# Patient Record
Sex: MALE | Race: WHITE | Employment: OTHER | URBAN - METROPOLITAN AREA
[De-identification: names, ages, dates, MRNs, and addresses within clinical notes are randomized per-mention and may not be internally consistent; named-entity substitution may affect disease eponyms.]

---

## 2022-09-02 ENCOUNTER — HOSPITAL ENCOUNTER (EMERGENCY)
Age: 87
Discharge: HOME | End: 2022-09-02
Payer: MEDICARE

## 2022-09-02 ENCOUNTER — APPOINTMENT (OUTPATIENT)
Dept: CT IMAGING | Age: 87
End: 2022-09-02
Payer: MEDICARE

## 2022-09-02 ENCOUNTER — APPOINTMENT (OUTPATIENT)
Dept: GENERAL RADIOLOGY | Age: 87
End: 2022-09-02
Payer: MEDICARE

## 2022-09-02 VITALS
BODY MASS INDEX: 29.8 KG/M2 | WEIGHT: 220 LBS | HEART RATE: 95 BPM | DIASTOLIC BLOOD PRESSURE: 80 MMHG | RESPIRATION RATE: 20 BRPM | OXYGEN SATURATION: 95 % | TEMPERATURE: 99.5 F | SYSTOLIC BLOOD PRESSURE: 136 MMHG | HEIGHT: 72 IN
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
## 2022-09-02 LAB
ALBUMIN SERPL-MCNC: 3.7 G/DL (ref 3.5–4.6)
ALP BLD-CCNC: 82 U/L (ref 35–104)
ALT SERPL-CCNC: 9 U/L (ref 0–41)
ANION GAP SERPL CALCULATED.3IONS-SCNC: 8 MEQ/L (ref 9–15)
APTT: 31.4 SEC (ref 24.4–36.8)
AST SERPL-CCNC: 15 U/L (ref 0–40)
BASOPHILS ABSOLUTE: 0 K/UL (ref 0–0.2)
BASOPHILS RELATIVE PERCENT: 0.4 %
BILIRUB SERPL-MCNC: 0.5 MG/DL (ref 0.2–0.7)
BUN BLDV-MCNC: 18 MG/DL (ref 8–23)
CALCIUM SERPL-MCNC: 8.2 MG/DL (ref 8.5–9.9)
CHLORIDE BLD-SCNC: 105 MEQ/L (ref 95–107)
CO2: 24 MEQ/L (ref 20–31)
CREAT SERPL-MCNC: 0.97 MG/DL (ref 0.7–1.2)
EOSINOPHILS ABSOLUTE: 0.2 K/UL (ref 0–0.7)
EOSINOPHILS RELATIVE PERCENT: 3 %
ETHANOL PERCENT: NORMAL G/DL
ETHANOL: <10 MG/DL (ref 0–0.08)
GFR AFRICAN AMERICAN: >60
GFR NON-AFRICAN AMERICAN: >60
GLOBULIN: 2.7 G/DL (ref 2.3–3.5)
GLUCOSE BLD-MCNC: 154 MG/DL (ref 70–99)
HCT VFR BLD CALC: 36 % (ref 42–52)
HEMOGLOBIN: 12.3 G/DL (ref 14–18)
INR BLD: 1.1
LYMPHOCYTES ABSOLUTE: 1 K/UL (ref 1–4.8)
LYMPHOCYTES RELATIVE PERCENT: 15 %
MCH RBC QN AUTO: 31.4 PG (ref 27–31.3)
MCHC RBC AUTO-ENTMCNC: 34.1 % (ref 33–37)
MCV RBC AUTO: 92.2 FL (ref 80–100)
MONOCYTES ABSOLUTE: 0.6 K/UL (ref 0.2–0.8)
MONOCYTES RELATIVE PERCENT: 9.8 %
NEUTROPHILS ABSOLUTE: 4.7 K/UL (ref 1.4–6.5)
NEUTROPHILS RELATIVE PERCENT: 71.8 %
PDW BLD-RTO: 14.2 % (ref 11.5–14.5)
PLATELET # BLD: 214 K/UL (ref 130–400)
POTASSIUM SERPL-SCNC: 4.5 MEQ/L (ref 3.4–4.9)
PROTHROMBIN TIME: 13.8 SEC (ref 12.3–14.9)
RBC # BLD: 3.9 M/UL (ref 4.7–6.1)
SODIUM BLD-SCNC: 137 MEQ/L (ref 135–144)
TOTAL PROTEIN: 6.4 G/DL (ref 6.3–8)
WBC # BLD: 6.5 K/UL (ref 4.8–10.8)

## 2022-09-02 PROCEDURE — 70450 CT HEAD/BRAIN W/O DYE: CPT

## 2022-09-02 PROCEDURE — 71045 X-RAY EXAM CHEST 1 VIEW: CPT

## 2022-09-02 PROCEDURE — 73521 X-RAY EXAM HIPS BI 2 VIEWS: CPT

## 2022-09-02 PROCEDURE — 71110 X-RAY EXAM RIBS BIL 3 VIEWS: CPT

## 2022-09-02 PROCEDURE — 93005 ELECTROCARDIOGRAM TRACING: CPT

## 2022-09-02 PROCEDURE — 6830039000 HC L3 TRAUMA ALERT

## 2022-09-02 PROCEDURE — 85730 THROMBOPLASTIN TIME PARTIAL: CPT

## 2022-09-02 PROCEDURE — 82077 ASSAY SPEC XCP UR&BREATH IA: CPT

## 2022-09-02 PROCEDURE — 85610 PROTHROMBIN TIME: CPT

## 2022-09-02 PROCEDURE — 72125 CT NECK SPINE W/O DYE: CPT

## 2022-09-02 PROCEDURE — 80053 COMPREHEN METABOLIC PANEL: CPT

## 2022-09-02 PROCEDURE — 85025 COMPLETE CBC W/AUTO DIFF WBC: CPT

## 2022-09-02 PROCEDURE — 36415 COLL VENOUS BLD VENIPUNCTURE: CPT

## 2022-09-02 PROCEDURE — 99285 EMERGENCY DEPT VISIT HI MDM: CPT

## 2022-09-02 ASSESSMENT — PAIN - FUNCTIONAL ASSESSMENT: PAIN_FUNCTIONAL_ASSESSMENT: NONE - DENIES PAIN

## 2022-09-02 NOTE — ED TRIAGE NOTES
Pt presents to ED from home via EMS with c/o fall. Pt reports that he fell in the shower, hitting the left side of his rib cage. Pt states that he is unsure if he passed out; unknown head injury, denies blood thinners. Upon arrival to ED, pt is A/Ox4, skin p/w/d, respirations even and unlabored, msp's intact. Pt denies chest pain, SOB, n/v/d, fever, and chills. Pt's daughter at bedside reports that pt did not actually fall, states that pt did not have his walker nearby while in the bathroom and reports that he \"crumpled\" to the floor. Reports that pt did not hit his head. Reports PMHx of dementia.

## 2022-09-02 NOTE — ED PROVIDER NOTES
[address] [facility] ED  EMERGENCY DEPARTMENT ENCOUNTER      Pt Name: Bashir Garcia  MRN: [id number]  [place] 11/30/1932  Date of evaluation: 9/2/2022  Provider: Johnnie Dubose PA-C    CHIEF COMPLAINT       Chief Complaint   Patient presents with    Fall     Pt reports that he fell in the shower, hitting the left-side of his rib cage; states that he is unsure if he had an LOC, unknown head injury - denies blood thinners         HISTORY OF PRESENT ILLNESS   (Location/Symptom, Timing/Onset, Context/Setting, Quality, Duration, Modifying Factors, Severity)  Note limiting factors. Bashir Garcia is a 80 y.o. male who presents to the emergency department for evaluation of fall that occurred prior to arrival.  Initial report from EMS states patient fell in the shower. Upon arrival to the ED patient is unable to add to the history. He states he does not remember the fall. He denies any pain at this time. Patient daughter arrives to the ED to assist with history. She states he has a history of dementia. She states that he did not actually fall. He is ambulatory with walker at all times and was in the bathroom attempted to walk towards his walker that was several feet away. When he stood up he \"crumpled\" to the floor and his wife witnessed the whole thing. Daughter states Jamar Shah really did not fall or hurt himself we just want him checked out to make sure everything is ok. \" There was no syncope. Pt denies any pain at this time and no visible injuries. Hx limited 2/2 to dementia. HPI    Nursing Notes were reviewed. REVIEW OF SYSTEMS    (2-9 systems for level 4, 10 or more for level 5)     Review of Systems   Unable to perform ROS: Dementia     Except as noted above the remainder of the review of systems was reviewed and negative. PAST MEDICAL HISTORY   No past medical history on file. SURGICAL HISTORY     No past surgical history on file.       CURRENT MEDICATIONS     There are no discharge medications for this patient. ALLERGIES     Patient has no known allergies. FAMILY HISTORY     No family history on file. SOCIAL HISTORY       Social History     Socioeconomic History    Marital status:        SCREENINGS         [place] Coma Scale  Eye Opening: Spontaneous  Best Verbal Response: Oriented  Best Motor Response: Obeys commands  [place] Coma Scale Score: 15                     CIWA Assessment  BP: 136/80  Heart Rate: 95                 PHYSICAL EXAM    (up to 7 for level 4, 8 or more for level 5)     ED Triage Vitals   BP Temp Temp Source Heart Rate Resp SpO2 Height Weight   09/02/22 1218 09/02/22 1230 09/02/22 1230 09/02/22 1218 09/02/22 1218 09/02/22 1218 09/02/22 1218 09/02/22 1218   113/69 99.5 °F (37.5 °C) Oral 91 18 95 % 6' (1.829 m) 220 lb (99.8 kg)       Physical Exam  Constitutional:       General: He is not in acute distress. Appearance: He is well-developed. He is not ill-appearing, toxic-appearing or diaphoretic. HENT:      Head: Normocephalic and atraumatic. Nose: Nose normal.      Mouth/Throat:      Mouth: Mucous membranes are moist.   Eyes:      Pupils: Pupils are equal, round, and reactive to light. Cardiovascular:      Rate and Rhythm: Normal rate and regular rhythm. Pulses: Normal pulses. Heart sounds: No murmur heard. No friction rub. No gallop. Pulmonary:      Effort: Pulmonary effort is normal.      Breath sounds: Normal breath sounds. No wheezing, rhonchi or rales. Abdominal:      General: Bowel sounds are normal. There is no distension. Tenderness: There is no abdominal tenderness. There is no guarding or rebound. Musculoskeletal:         General: No swelling. Cervical back: Normal and normal range of motion. Thoracic back: Normal.      Lumbar back: Normal.      Right lower leg: No edema. Left lower leg: No edema. Skin:     General: Skin is warm and dry.       Capillary Refill: Capillary refill takes less than 2 seconds. Neurological:      General: No focal deficit present. Mental Status: He is alert. Mental status is at baseline. Comments: A&O x 2, baseline. DIAGNOSTIC RESULTS     EKG: All EKG's are interpreted by the Emergency Department Physician who either signs or Co-signs this chart in the absence of a cardiologist.    EKG shows NSR with HR 87, normal axis, normal intervals, no ST changes. RADIOLOGY:   Non-plain film images such as CT, Ultrasound and MRI are read by the radiologist. Plain radiographic images are visualized and preliminarily interpreted by the emergency physician with the below findings:        Interpretation per the Radiologist below, if available at the time of this note:    XR RIBS BILATERAL (3 VIEWS)   Final Result   1. OSTEOPENIA. 2. POSSIBLE RIGHT RIB FRACTURES, AS DISCUSSED. THESE ARE STILL OF INDETERMINATE AGE. XR CHEST PORTABLE   Final Result   1. CARDIOMEGALY. 2. MILD BASILAR ATELECTASIS. 3. POSSIBLE LARGE ESOPHAGEAL HIATAL HERNIA. 4. NO OTHER SIGNIFICANT ACUTE INTRATHORACIC ABNORMALITY. XR HIP BILATERAL W AP PELVIS (2 VIEWS)   Final Result   OSTEOPENIA AND DEGENERATIVE CHANGES, DISCUSSED. NO SIGNIFICANT ACUTE TRAUMATIC OSSEOUS ABNORMALITY, PARTICULARLY ABOVE. CT Head WO Contrast   Final Result   1. 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 2. POSSIBLE SMALL OLD INFARCTS IN THE RIGHT CEREBELLAR HEMISPHERE. 3. NO OTHER SIGNIFICANT ACUTE INTRACRANIAL ABNORMALITY. 4. COMPARISON WITH ANY PRIOR STUDIES, IF AVAILABLE, MAY STILL BE HELPFUL TO CONFIRM THESE IMPRESSIONS. CT CERVICAL SPINE WO CONTRAST   Final Result   1. PROMINENT CHRONIC MULTILEVEL DEGENERATIVE SPONDYLOSIS. 2. MILD C5-C6 ANTEROLISTHESIS, MOST LIKELY RELATED TO THE ABOVE.    3. NO OTHER EVIDENCE OF SIGNIFICANT SUPERIMPOSED ACUTE TRAUMATIC OSSEOUS ABNORMALITY OF THE CERVICAL SPINE               ED BEDSIDE ULTRASOUND:   Performed by ED Physician - none    LABS:  Labs Reviewed   COMPREHENSIVE METABOLIC PANEL - Abnormal; Notable for the following components:       Result Value    Anion Gap 8 (*)     Glucose 154 (*)     Calcium 8.2 (*)     All other components within normal limits   CBC WITH AUTO DIFFERENTIAL - Abnormal; Notable for the following components:    RBC 3.90 (*)     Hemoglobin 12.3 (*)     Hematocrit 36.0 (*)     MCH 31.4 (*)     All other components within normal limits   ETHANOL   APTT   PROTIME-INR   URINALYSIS WITH REFLEX TO CULTURE       All other labs were within normal range or not returned as of this dictation. EMERGENCY DEPARTMENT COURSE and DIFFERENTIAL DIAGNOSIS/MDM:   Vitals:    Vitals:    09/02/22 1218 09/02/22 1230 09/02/22 1249 09/02/22 1451   BP: 113/69  113/69 136/80   Pulse: 91  91 95   Resp: 18  18 20   Temp:  99.5 °F (37.5 °C) 99.5 °F (37.5 °C)    TempSrc:  Oral Oral    SpO2: 95%  95% 95%   Weight: 220 lb (99.8 kg)      Height: 6' (1.829 m)          MDM    Patient is a 78-year-old male presents the ED for evaluation of fall. Initial report from EMS was that patient fell in the shower however patient daughter arrives to the ED states patient has dementia and that he actually did not fall and that family just wants him checked out. Labs largely unremarkable. CT head cervical spine chest x-ray and x-ray of bilateral hips and pelvis negative for traumatic injury. X-ray of bilateral ribs shows age-indeterminate right-sided rib fractures. As patient did not actually fall and strike the chest, no signs of injury, not complaining of pain and no tenderness palpation suspect that these are old fractures. Patient is nontoxic-appearing with stable vital stable for discharge. Daughter is taking him home. Follow-up with primary care 1 to 2 days return to the ED for worsening symptoms given warning signs for which she should return.   Patient and patient daughter understand and agree to plan    REASSESSMENT          CRITICAL CARE TIME   Total Critical Care time was 0 minutes, excluding separately reportable procedures. There was a high probability of clinically significant/life threatening deterioration in the patient's condition which required my urgent intervention. CONSULTS:  None    PROCEDURES:  Unless otherwise noted below, none     Procedures        FINAL IMPRESSION      1. Fall, initial encounter          DISPOSITION/PLAN   DISPOSITION Decision To Discharge 09/02/2022 03:24:31 PM      PATIENT REFERRED TO:  DO Andrea Blum [phone]    Schedule an appointment as soon as possible for a visit in 1 day      [facility] ED  [address] [address]  [phone]  Go to   As needed, If symptoms worsen      DISCHARGE MEDICATIONS:  There are no discharge medications for this patient. Controlled Substances Monitoring:     No flowsheet data found.     (Please note that portions of this note were completed with a voice recognition program.  Efforts were made to edit the dictations but occasionally words are mis-transcribed.)    Melanie Allen PA-C (electronically signed)             Melanie Allen PA-C  09/02/22 [address] [place] [address]

## 2022-09-06 LAB
EKG ATRIAL RATE: 87 BPM
EKG P AXIS: 33 DEGREES
EKG P-R INTERVAL: 152 MS
EKG Q-T INTERVAL: 368 MS
EKG QRS DURATION: 76 MS
EKG QTC CALCULATION (BAZETT): 442 MS
EKG R AXIS: 3 DEGREES
EKG T AXIS: 45 DEGREES
EKG VENTRICULAR RATE: 87 BPM

## 2023-01-19 ENCOUNTER — HOSPITAL ENCOUNTER (INPATIENT)
Age: 88
LOS: 5 days | Discharge: NURSING, REHAB OR LONG-TERM CARE | DRG: 177 | End: 2023-01-24
Attending: EMERGENCY MEDICINE | Admitting: INTERNAL MEDICINE
Payer: MEDICARE

## 2023-01-19 ENCOUNTER — APPOINTMENT (OUTPATIENT)
Dept: CT IMAGING | Age: 88
DRG: 177 | End: 2023-01-19
Payer: MEDICARE

## 2023-01-19 ENCOUNTER — APPOINTMENT (OUTPATIENT)
Dept: GENERAL RADIOLOGY | Age: 88
DRG: 177 | End: 2023-01-19
Payer: MEDICARE

## 2023-01-19 DIAGNOSIS — R09.02 HYPOXIA: ICD-10-CM

## 2023-01-19 DIAGNOSIS — J18.9 COMMUNITY ACQUIRED PNEUMONIA OF RIGHT MIDDLE LOBE OF LUNG: Primary | ICD-10-CM

## 2023-01-19 PROBLEM — J15.7 COMMUNITY ACQUIRED PNEUMONIA DUE TO MYCOPLASMA PNEUMONIAE: Status: ACTIVE | Noted: 2023-01-19

## 2023-01-19 LAB
ALBUMIN SERPL-MCNC: 3.5 G/DL (ref 3.5–4.6)
ALP BLD-CCNC: 86 U/L (ref 35–104)
ALT SERPL-CCNC: 7 U/L (ref 0–41)
ANION GAP SERPL CALCULATED.3IONS-SCNC: 10 MEQ/L (ref 9–15)
AST SERPL-CCNC: 16 U/L (ref 0–40)
BASOPHILS ABSOLUTE: 0 K/UL (ref 0–0.2)
BASOPHILS RELATIVE PERCENT: 0.4 %
BILIRUB SERPL-MCNC: 1.4 MG/DL (ref 0.2–0.7)
BUN BLDV-MCNC: 16 MG/DL (ref 8–23)
CALCIUM SERPL-MCNC: 8.4 MG/DL (ref 8.5–9.9)
CHLORIDE BLD-SCNC: 101 MEQ/L (ref 95–107)
CHOLESTEROL, TOTAL: 121 MG/DL (ref 0–199)
CO2: 28 MEQ/L (ref 20–31)
CREAT SERPL-MCNC: 0.91 MG/DL (ref 0.7–1.2)
EOSINOPHILS ABSOLUTE: 0 K/UL (ref 0–0.7)
EOSINOPHILS RELATIVE PERCENT: 0.3 %
GFR SERPL CREATININE-BSD FRML MDRD: >60 ML/MIN/{1.73_M2}
GLOBULIN: 3.3 G/DL (ref 2.3–3.5)
GLUCOSE BLD-MCNC: 157 MG/DL (ref 70–99)
HCT VFR BLD CALC: 37.7 % (ref 42–52)
HDLC SERPL-MCNC: 37 MG/DL (ref 40–59)
HEMOGLOBIN: 12.7 G/DL (ref 14–18)
LDL CHOLESTEROL CALCULATED: 72 MG/DL (ref 0–129)
LYMPHOCYTES ABSOLUTE: 1.3 K/UL (ref 1–4.8)
LYMPHOCYTES RELATIVE PERCENT: 8 %
MCH RBC QN AUTO: 31 PG (ref 27–31.3)
MCHC RBC AUTO-ENTMCNC: 33.7 % (ref 33–37)
MCV RBC AUTO: 92.1 FL (ref 79–92.2)
MONOCYTES ABSOLUTE: 1.1 K/UL (ref 0.2–0.8)
MONOCYTES RELATIVE PERCENT: 7.1 %
NEUTROPHILS ABSOLUTE: 13.9 K/UL (ref 1.4–6.5)
NEUTROPHILS RELATIVE PERCENT: 85 %
OVALOCYTES: ABNORMAL
PDW BLD-RTO: 14.3 % (ref 11.5–14.5)
PLATELET # BLD: 217 K/UL (ref 130–400)
PLATELET SLIDE REVIEW: NORMAL
POIKILOCYTES: ABNORMAL
POTASSIUM SERPL-SCNC: 4.4 MEQ/L (ref 3.4–4.9)
RBC # BLD: 4.1 M/UL (ref 4.7–6.1)
SODIUM BLD-SCNC: 139 MEQ/L (ref 135–144)
TOTAL PROTEIN: 6.8 G/DL (ref 6.3–8)
TRIGL SERPL-MCNC: 59 MG/DL (ref 0–150)
WBC # BLD: 16.4 K/UL (ref 4.8–10.8)

## 2023-01-19 PROCEDURE — 6370000000 HC RX 637 (ALT 250 FOR IP): Performed by: EMERGENCY MEDICINE

## 2023-01-19 PROCEDURE — 80061 LIPID PANEL: CPT

## 2023-01-19 PROCEDURE — 85025 COMPLETE CBC W/AUTO DIFF WBC: CPT

## 2023-01-19 PROCEDURE — 94640 AIRWAY INHALATION TREATMENT: CPT

## 2023-01-19 PROCEDURE — 99285 EMERGENCY DEPT VISIT HI MDM: CPT

## 2023-01-19 PROCEDURE — 6830039000 HC L3 TRAUMA ALERT

## 2023-01-19 PROCEDURE — 94760 N-INVAS EAR/PLS OXIMETRY 1: CPT

## 2023-01-19 PROCEDURE — 6370000000 HC RX 637 (ALT 250 FOR IP): Performed by: INTERNAL MEDICINE

## 2023-01-19 PROCEDURE — 1210000000 HC MED SURG R&B

## 2023-01-19 PROCEDURE — 36415 COLL VENOUS BLD VENIPUNCTURE: CPT

## 2023-01-19 PROCEDURE — 2500000003 HC RX 250 WO HCPCS: Performed by: INTERNAL MEDICINE

## 2023-01-19 PROCEDURE — 6360000002 HC RX W HCPCS: Performed by: INTERNAL MEDICINE

## 2023-01-19 PROCEDURE — 71046 X-RAY EXAM CHEST 2 VIEWS: CPT

## 2023-01-19 PROCEDURE — 94150 VITAL CAPACITY TEST: CPT

## 2023-01-19 PROCEDURE — 6360000002 HC RX W HCPCS: Performed by: EMERGENCY MEDICINE

## 2023-01-19 PROCEDURE — 96365 THER/PROPH/DIAG IV INF INIT: CPT

## 2023-01-19 PROCEDURE — 2580000003 HC RX 258: Performed by: EMERGENCY MEDICINE

## 2023-01-19 PROCEDURE — 87899 AGENT NOS ASSAY W/OPTIC: CPT

## 2023-01-19 PROCEDURE — 71260 CT THORAX DX C+: CPT

## 2023-01-19 PROCEDURE — 6360000004 HC RX CONTRAST MEDICATION: Performed by: INTERNAL MEDICINE

## 2023-01-19 PROCEDURE — 80053 COMPREHEN METABOLIC PANEL: CPT

## 2023-01-19 PROCEDURE — 94664 DEMO&/EVAL PT USE INHALER: CPT

## 2023-01-19 PROCEDURE — 87040 BLOOD CULTURE FOR BACTERIA: CPT

## 2023-01-19 PROCEDURE — 93005 ELECTROCARDIOGRAM TRACING: CPT

## 2023-01-19 RX ORDER — IPRATROPIUM BROMIDE AND ALBUTEROL SULFATE 2.5; .5 MG/3ML; MG/3ML
1 SOLUTION RESPIRATORY (INHALATION) ONCE
Status: COMPLETED | OUTPATIENT
Start: 2023-01-19 | End: 2023-01-19

## 2023-01-19 RX ORDER — ENOXAPARIN SODIUM 100 MG/ML
40 INJECTION SUBCUTANEOUS DAILY
Status: DISCONTINUED | OUTPATIENT
Start: 2023-01-19 | End: 2023-01-24 | Stop reason: HOSPADM

## 2023-01-19 RX ORDER — IPRATROPIUM BROMIDE AND ALBUTEROL SULFATE 2.5; .5 MG/3ML; MG/3ML
1 SOLUTION RESPIRATORY (INHALATION) EVERY 4 HOURS PRN
Status: DISCONTINUED | OUTPATIENT
Start: 2023-01-19 | End: 2023-01-24 | Stop reason: HOSPADM

## 2023-01-19 RX ORDER — GUAIFENESIN 600 MG/1
600 TABLET, EXTENDED RELEASE ORAL 2 TIMES DAILY
Status: DISCONTINUED | OUTPATIENT
Start: 2023-01-19 | End: 2023-01-24 | Stop reason: HOSPADM

## 2023-01-19 RX ORDER — ACETAMINOPHEN 650 MG/1
650 SUPPOSITORY RECTAL EVERY 6 HOURS PRN
Status: DISCONTINUED | OUTPATIENT
Start: 2023-01-19 | End: 2023-01-24 | Stop reason: HOSPADM

## 2023-01-19 RX ORDER — IPRATROPIUM BROMIDE AND ALBUTEROL SULFATE 2.5; .5 MG/3ML; MG/3ML
1 SOLUTION RESPIRATORY (INHALATION) EVERY 4 HOURS PRN
Status: DISCONTINUED | OUTPATIENT
Start: 2023-01-19 | End: 2023-01-19 | Stop reason: SDUPTHER

## 2023-01-19 RX ORDER — ONDANSETRON 2 MG/ML
4 INJECTION INTRAMUSCULAR; INTRAVENOUS EVERY 6 HOURS PRN
Status: DISCONTINUED | OUTPATIENT
Start: 2023-01-19 | End: 2023-01-24 | Stop reason: HOSPADM

## 2023-01-19 RX ORDER — METRONIDAZOLE 500 MG/100ML
500 INJECTION, SOLUTION INTRAVENOUS EVERY 8 HOURS
Status: DISCONTINUED | OUTPATIENT
Start: 2023-01-19 | End: 2023-01-22

## 2023-01-19 RX ORDER — IPRATROPIUM BROMIDE AND ALBUTEROL SULFATE 2.5; .5 MG/3ML; MG/3ML
1 SOLUTION RESPIRATORY (INHALATION) EVERY 4 HOURS PRN
Status: DISCONTINUED | OUTPATIENT
Start: 2023-01-19 | End: 2023-01-19

## 2023-01-19 RX ORDER — ACETAMINOPHEN 325 MG/1
650 TABLET ORAL EVERY 6 HOURS PRN
Status: DISCONTINUED | OUTPATIENT
Start: 2023-01-19 | End: 2023-01-24 | Stop reason: HOSPADM

## 2023-01-19 RX ORDER — 0.9 % SODIUM CHLORIDE 0.9 %
500 INTRAVENOUS SOLUTION INTRAVENOUS ONCE
Status: COMPLETED | OUTPATIENT
Start: 2023-01-19 | End: 2023-01-19

## 2023-01-19 RX ORDER — ONDANSETRON 4 MG/1
4 TABLET, ORALLY DISINTEGRATING ORAL EVERY 8 HOURS PRN
Status: DISCONTINUED | OUTPATIENT
Start: 2023-01-19 | End: 2023-01-24 | Stop reason: HOSPADM

## 2023-01-19 RX ORDER — AZITHROMYCIN 500 MG/1
500 TABLET, FILM COATED ORAL ONCE
Status: COMPLETED | OUTPATIENT
Start: 2023-01-19 | End: 2023-01-19

## 2023-01-19 RX ADMIN — ENOXAPARIN SODIUM 40 MG: 100 INJECTION SUBCUTANEOUS at 18:12

## 2023-01-19 RX ADMIN — CEFTRIAXONE SODIUM 1000 MG: 1 INJECTION, POWDER, FOR SOLUTION INTRAMUSCULAR; INTRAVENOUS at 12:11

## 2023-01-19 RX ADMIN — GUAIFENESIN 600 MG: 600 TABLET ORAL at 20:56

## 2023-01-19 RX ADMIN — IOPAMIDOL 50 ML: 612 INJECTION, SOLUTION INTRAVENOUS at 13:28

## 2023-01-19 RX ADMIN — SODIUM CHLORIDE 500 ML: 9 INJECTION, SOLUTION INTRAVENOUS at 11:48

## 2023-01-19 RX ADMIN — IPRATROPIUM BROMIDE AND ALBUTEROL SULFATE 1 AMPULE: .5; 2.5 SOLUTION RESPIRATORY (INHALATION) at 12:04

## 2023-01-19 RX ADMIN — MICONAZOLE NITRATE: 2 POWDER TOPICAL at 20:55

## 2023-01-19 RX ADMIN — METRONIDAZOLE 500 MG: 500 INJECTION, SOLUTION INTRAVENOUS at 20:55

## 2023-01-19 RX ADMIN — AZITHROMYCIN MONOHYDRATE 500 MG: 500 TABLET ORAL at 12:11

## 2023-01-19 ASSESSMENT — LIFESTYLE VARIABLES
HOW MANY STANDARD DRINKS CONTAINING ALCOHOL DO YOU HAVE ON A TYPICAL DAY: PATIENT DOES NOT DRINK
HOW OFTEN DO YOU HAVE A DRINK CONTAINING ALCOHOL: NEVER
HOW OFTEN DO YOU HAVE A DRINK CONTAINING ALCOHOL: NEVER
HOW MANY STANDARD DRINKS CONTAINING ALCOHOL DO YOU HAVE ON A TYPICAL DAY: PATIENT DOES NOT DRINK

## 2023-01-19 ASSESSMENT — PAIN - FUNCTIONAL ASSESSMENT: PAIN_FUNCTIONAL_ASSESSMENT: NONE - DENIES PAIN

## 2023-01-19 ASSESSMENT — PAIN SCALES - GENERAL: PAINLEVEL_OUTOF10: 0

## 2023-01-19 NOTE — CARE COORDINATION
[facility] [place] Case Management Initial Discharge Assessment    Met with Patient to discuss discharge plan. PCP: Wilda Ramirez DO                                Date of Last Visit: N/A    [facility] Patient: Yes        [facility] Notified: yes - Elva Curtis    If no PCP, list provided? N/A    Discharge Planning    Living Arrangements: independently at home    Who do you live with? WIFE    Who helps you with your care:  self    If lives at home:     Do you have any barriers navigating in your home? NO    Patient can perform ADL? Yes    Current Services (outpatient and in home) :  None    Dialysis: No    Is transportation available to get to your appointments? Yes    DME Equipment:  Lender     Respiratory equipment: NO    Respiratory provider:  no     Pharmacy:  yes - [facility] with Medication Assistance Program?  No      Patient agreeable to [facility] ? Declined    Patient agreeable to SNF/Rehab? Declined    Other discharge needs identified? N/A    Does Patient Have a High-Risk for Readmission Diagnosis (CHF, PN, MI, COPD)? No        Initial Discharge Plan? (Note: please see concurrent daily documentation for any updates after initial note). LSW MEET WITH PT AT BEDSIDE. DISCUSS DC PLAN WITH PT. PT AGREE WITH DC PLAN HOME WITH WIFE. DENIES ANY NEEDS AT THIS TIME. Readmission Risk              Risk of Unplanned Readmission:  0        CMI DONE.    Electronically signed by Felicita Scheuermann, LSW on 1/19/2023 at 12:29 PM

## 2023-01-19 NOTE — FLOWSHEET NOTE
New admission to room 282. Patient is A&Ox1 Disoriented to time, place, and situation. Bilateral lower edema noted 4+ Weeping noted to lower extremeties Abd fold in redden and dry. Spoke with wife and daughter via phone to assist with admission Per wife patient doesn't take home meds.      1900 resp panel sent

## 2023-01-19 NOTE — ED PROVIDER NOTES
[address] [facility] ED  EMERGENCY DEPARTMENT ENCOUNTER      Pt Name: Maurizio Barajas  MRN: [id number]  [place] 11/30/1932  Date of evaluation: 1/19/2023  Provider: Debbie Linod MD    CHIEF COMPLAINT       Chief Complaint   Patient presents with    Fall         HISTORY OF PRESENT ILLNESS   (Location/Symptom, Timing/Onset, Context/Setting, Quality, Duration, Modifying Factors, Severity)  Note limiting factors. 69-year-old male presenting after a fall. Patient reportedly fell out of bed. Did not hit head or lose consciousness. Notes no specific complaints, did not want to be transported to the hospital.  EMS states that his oxygen was low and so family agreed for transport. Patient has a cough. Symptoms have been ongoing for several weeks. Has over-the-counter meds with no improvement in symptoms. Nursing Notes were reviewed. REVIEW OF SYSTEMS    (2-9 systems for level 4, 10 or more for level 5)     Review of Systems   All other systems reviewed and are negative. Except as noted above the remainder of the review of systems was reviewed and negative. PAST MEDICAL HISTORY   History reviewed. No pertinent past medical history. SURGICAL HISTORY     History reviewed. No pertinent surgical history. CURRENT MEDICATIONS       Previous Medications    No medications on file       ALLERGIES     Patient has no known allergies. FAMILY HISTORY     History reviewed. No pertinent family history.        SOCIAL HISTORY       Social History     Socioeconomic History    Marital status:      Spouse name: None    Number of children: None    Years of education: None    Highest education level: None       SCREENINGS    [place] Coma Scale  Eye Opening: Spontaneous  Best Verbal Response: Oriented  Best Motor Response: Obeys commands  [place] Coma Scale Score: 15          PHYSICAL EXAM    (up to 7 for level 4, 8 or more for level 5)     ED Triage Vitals   BP Temp Temp Source Heart Rate Resp SpO2 Height Weight   01/19/23 1040 01/19/23 1042 01/19/23 1042 01/19/23 1040 01/19/23 1040 01/19/23 1040 01/19/23 1042 01/19/23 1042   117/68 99.1 °F (37.3 °C) Oral (!) 117 18 92 % 6' (1.829 m) 220 lb (99.8 kg)       Physical Exam  Vitals and nursing note reviewed. Constitutional:       General: He is not in acute distress. Appearance: Normal appearance. He is well-developed. He is not ill-appearing. Comments: cough   HENT:      Head: Normocephalic and atraumatic. Mouth/Throat:      Mouth: Mucous membranes are moist.      Pharynx: Oropharynx is clear. Eyes:      Extraocular Movements: Extraocular movements intact. Conjunctiva/sclera: Conjunctivae normal.   Cardiovascular:      Rate and Rhythm: Normal rate and regular rhythm. Pulmonary:      Effort: Pulmonary effort is normal.      Breath sounds: Normal breath sounds. Abdominal:      General: Bowel sounds are normal.      Palpations: Abdomen is soft. Tenderness: There is no abdominal tenderness. Musculoskeletal:         General: No deformity. Normal range of motion. Cervical back: Normal range of motion and neck supple. Skin:     General: Skin is warm and dry. Capillary Refill: Capillary refill takes less than 2 seconds. Neurological:      General: No focal deficit present. Mental Status: He is alert and oriented to person, place, and time. Mental status is at baseline. Cranial Nerves: No cranial nerve deficit. Psychiatric:         Thought Content:  Thought content normal.       DIAGNOSTIC RESULTS     EKG: All EKG's are interpreted by the Emergency Department Physician who either signs or Co-signs this chart in the absence of a cardiologist.    RADIOLOGY:   Non-plain film images such as CT, Ultrasound and MRI are read by the radiologist. Plain radiographic images are visualized and preliminarily interpreted by the emergency physician with the below findings:    Interpretation per the Radiologist below, if available at the time of this note:    XR CHEST (2 VW)   Final Result   5.5 x 4.2 cm masslike opacity seen within the right perihilar region likely   representing pneumonia. Given that there is a masslike appearance dedicated   CT of the chest is recommended to exclude underlying pathology. CT CHEST W CONTRAST    (Results Pending)       LABS:  Labs Reviewed   CBC WITH AUTO DIFFERENTIAL - Abnormal; Notable for the following components:       Result Value    WBC 16.4 (*)     RBC 4.10 (*)     Hemoglobin 12.7 (*)     Hematocrit 37.7 (*)     Neutrophils Absolute 13.9 (*)     Monocytes Absolute 1.1 (*)     All other components within normal limits   COMPREHENSIVE METABOLIC PANEL - Abnormal; Notable for the following components:    Glucose 157 (*)     Calcium 8.4 (*)     Total Bilirubin 1.4 (*)     All other components within normal limits   CULTURE, BLOOD 1   CULTURE, BLOOD 1       All other labs were within normal range or not returned as of this dictation. EMERGENCY DEPARTMENT COURSE and DIFFERENTIAL DIAGNOSIS/MDM:   Vitals:    Vitals:    01/19/23 1044 01/19/23 1100 01/19/23 1200 01/19/23 1205   BP: 114/64 124/64 131/77    Pulse: (!) 112 (!) 110 (!) 115    Resp: 20      Temp: 99.1 °F (37.3 °C)      TempSrc: Oral      SpO2: 93% 93% 94% 91%   Weight:       Height:           MDM  Number of Diagnoses or Management Options  Community acquired pneumonia of right middle lobe of lung  Hypoxia  Diagnosis management comments: Patient with persistent cough. Elevated white count and likely pneumonia on chest x-ray. Started on Rocephin and azithro. Requires 2L to maintain oxygen saturation. Admitted to [facility]. Otherwise stable throughout ED course. Procedures    CRITICAL CARE TIME   Total Critical Care time was 45 minutes, excluding separately reportable procedures.   There was a high probability of clinically significant/life threatening deterioration in the patient's condition which required my urgent intervention. FINAL IMPRESSION      1. Community acquired pneumonia of right middle lobe of lung    2.  Hypoxia          DISPOSITION/PLAN   DISPOSITION Decision To Admit 01/19/2023 11:43:45 AM      (Please note that portions of this note were completed with a voice recognition program.  Efforts were made to edit the dictations but occasionally words are mis-transcribed.)    Ariane Minor MD (electronically signed)  Attending Emergency Physician        Ariane Minor MD  01/19/23 5022

## 2023-01-19 NOTE — ED NOTES
Transport here. Daughter going to drive around to front. Pts belongings on bed with him. Tele pack intact. No acute distress noted at this time.       Dung Pruitt RN  01/19/23 0886

## 2023-01-19 NOTE — ED NOTES
[facility] took bed away.  Now waiting for another bed assignment     Sakshi Johnson, RN  01/19/23 5524

## 2023-01-19 NOTE — ED NOTES
Pts daughter called. Updated her on plan of care after ok'ed by patient to update her.      Bridgett Berry RN  01/19/23 9732

## 2023-01-19 NOTE — ED NOTES
Pt asked for urinal. Helped patient get pants down but he had already started urinating in his diaper. Urinal held and pt voided some into urinal. Will change diaper when I get some help.       David Harrington RN  01/19/23 0627

## 2023-01-19 NOTE — ED NOTES
Bed assigned. Monitor room contacted by .  To use the same tele pack as they already sent earlier     Dot Bowles RN  01/19/23 8349

## 2023-01-19 NOTE — ED NOTES
Returned. Placed back onto monitor. No acute distress noted at this time.      Tiffanie Lord RN  01/19/23 0347

## 2023-01-19 NOTE — CARE COORDINATION
01/19/23    From:HOME WITH WIFE; INDEPENDENT; USE WALKER PRN; Admit:1/19/2023     PMH:    Anticipated Discharge Disposition: HOME WITH WIFE    Patient Mobility or PT/OT ordered:WILL NEED  Consults:     Clinical: NOT VACCINATED    Barriers to Discharge:    Assessments: CMI DONE.

## 2023-01-19 NOTE — ED TRIAGE NOTES
Pt arrived via [facility] care reporting a fall out of bed at home    When wife found pt, he was on the ground lying on his right side     Pt is baseline alert and oriented x1, history of dementia     Pt denies pain     Pt has a constant dry cough     Pt arrived on 3L put on by squad for spo2 of 90%    On RA he is 94%     Pt in 0 distress

## 2023-01-20 ENCOUNTER — APPOINTMENT (OUTPATIENT)
Dept: GENERAL RADIOLOGY | Age: 88
DRG: 177 | End: 2023-01-20
Payer: MEDICARE

## 2023-01-20 LAB
ADENOVIRUS BY PCR: NOT DETECTED
ANION GAP SERPL CALCULATED.3IONS-SCNC: 11 MEQ/L (ref 9–15)
BASOPHILS ABSOLUTE: 0.1 K/UL (ref 0–0.2)
BASOPHILS RELATIVE PERCENT: 0.6 %
BORDETELLA PARAPERTUSSIS BY PCR: NOT DETECTED
BORDETELLA PERTUSSIS BY PCR: NOT DETECTED
BUN BLDV-MCNC: 16 MG/DL (ref 8–23)
CALCIUM SERPL-MCNC: 7.8 MG/DL (ref 8.5–9.9)
CHLAMYDOPHILIA PNEUMONIAE BY PCR: NOT DETECTED
CHLORIDE BLD-SCNC: 105 MEQ/L (ref 95–107)
CO2: 22 MEQ/L (ref 20–31)
CORONAVIRUS 229E BY PCR: NOT DETECTED
CORONAVIRUS HKU1 BY PCR: NOT DETECTED
CORONAVIRUS NL63 BY PCR: NOT DETECTED
CORONAVIRUS OC43 BY PCR: NOT DETECTED
CREAT SERPL-MCNC: 0.73 MG/DL (ref 0.7–1.2)
EOSINOPHILS ABSOLUTE: 0.3 K/UL (ref 0–0.7)
EOSINOPHILS RELATIVE PERCENT: 2.8 %
GFR SERPL CREATININE-BSD FRML MDRD: >60 ML/MIN/{1.73_M2}
GLUCOSE BLD-MCNC: 95 MG/DL (ref 70–99)
HCT VFR BLD CALC: 34.1 % (ref 42–52)
HEMOGLOBIN: 11.5 G/DL (ref 14–18)
HUMAN METAPNEUMOVIRUS BY PCR: NOT DETECTED
HUMAN RHINOVIRUS/ENTEROVIRUS BY PCR: NOT DETECTED
INFLUENZA A BY PCR: NOT DETECTED
INFLUENZA B BY PCR: NOT DETECTED
LV EF: 63 %
LVEF MODALITY: NORMAL
LYMPHOCYTES ABSOLUTE: 1.5 K/UL (ref 1–4.8)
LYMPHOCYTES RELATIVE PERCENT: 12 %
MCH RBC QN AUTO: 31.2 PG (ref 27–31.3)
MCHC RBC AUTO-ENTMCNC: 33.7 % (ref 33–37)
MCV RBC AUTO: 92.7 FL (ref 79–92.2)
MONOCYTES ABSOLUTE: 1.1 K/UL (ref 0.2–0.8)
MONOCYTES RELATIVE PERCENT: 9 %
MYCOPLASMA PNEUMONIAE BY PCR: NOT DETECTED
NEUTROPHILS ABSOLUTE: 9.5 K/UL (ref 1.4–6.5)
NEUTROPHILS RELATIVE PERCENT: 75.6 %
PARAINFLUENZA VIRUS 1 BY PCR: NOT DETECTED
PARAINFLUENZA VIRUS 2 BY PCR: NOT DETECTED
PARAINFLUENZA VIRUS 3 BY PCR: NOT DETECTED
PARAINFLUENZA VIRUS 4 BY PCR: NOT DETECTED
PDW BLD-RTO: 14.2 % (ref 11.5–14.5)
PLATELET # BLD: 181 K/UL (ref 130–400)
PLATELET SLIDE REVIEW: NORMAL
POTASSIUM REFLEX MAGNESIUM: 4.2 MEQ/L (ref 3.4–4.9)
PRO-BNP: 528 PG/ML
PROCALCITONIN: 0.12 NG/ML (ref 0–0.15)
RBC # BLD: 3.68 M/UL (ref 4.7–6.1)
RBC # BLD: NORMAL 10*6/UL
REASON FOR REJECTION: NORMAL
REASON FOR REJECTION: NORMAL
REJECTED TEST: NORMAL
REJECTED TEST: NORMAL
RESPIRATORY SYNCYTIAL VIRUS BY PCR: NOT DETECTED
SARS-COV-2, PCR: NOT DETECTED
SLIDE REVIEW: ABNORMAL
SODIUM BLD-SCNC: 138 MEQ/L (ref 135–144)
TROPONIN: <0.01 NG/ML (ref 0–0.01)
TROPONIN: <0.01 NG/ML (ref 0–0.01)
WBC # BLD: 12.5 K/UL (ref 4.8–10.8)

## 2023-01-20 PROCEDURE — 93306 TTE W/DOPPLER COMPLETE: CPT

## 2023-01-20 PROCEDURE — 84484 ASSAY OF TROPONIN QUANT: CPT

## 2023-01-20 PROCEDURE — 84145 PROCALCITONIN (PCT): CPT

## 2023-01-20 PROCEDURE — 6370000000 HC RX 637 (ALT 250 FOR IP): Performed by: INTERNAL MEDICINE

## 2023-01-20 PROCEDURE — 36415 COLL VENOUS BLD VENIPUNCTURE: CPT

## 2023-01-20 PROCEDURE — 83880 ASSAY OF NATRIURETIC PEPTIDE: CPT

## 2023-01-20 PROCEDURE — 2580000003 HC RX 258: Performed by: INTERNAL MEDICINE

## 2023-01-20 PROCEDURE — 0202U NFCT DS 22 TRGT SARS-COV-2: CPT

## 2023-01-20 PROCEDURE — 6360000002 HC RX W HCPCS: Performed by: INTERNAL MEDICINE

## 2023-01-20 PROCEDURE — 1210000000 HC MED SURG R&B

## 2023-01-20 PROCEDURE — 80048 BASIC METABOLIC PNL TOTAL CA: CPT

## 2023-01-20 PROCEDURE — 2500000003 HC RX 250 WO HCPCS: Performed by: INTERNAL MEDICINE

## 2023-01-20 PROCEDURE — 85025 COMPLETE CBC W/AUTO DIFF WBC: CPT

## 2023-01-20 PROCEDURE — 74230 X-RAY XM SWLNG FUNCJ C+: CPT

## 2023-01-20 PROCEDURE — 92611 MOTION FLUOROSCOPY/SWALLOW: CPT

## 2023-01-20 RX ORDER — ISOSORBIDE MONONITRATE 60 MG/1
30 TABLET, EXTENDED RELEASE ORAL DAILY
Status: DISCONTINUED | OUTPATIENT
Start: 2023-01-20 | End: 2023-01-24 | Stop reason: HOSPADM

## 2023-01-20 RX ORDER — FUROSEMIDE 10 MG/ML
20 INJECTION INTRAMUSCULAR; INTRAVENOUS ONCE
Status: COMPLETED | OUTPATIENT
Start: 2023-01-20 | End: 2023-01-20

## 2023-01-20 RX ORDER — ASPIRIN 81 MG/1
81 TABLET, CHEWABLE ORAL DAILY
Status: DISCONTINUED | OUTPATIENT
Start: 2023-01-20 | End: 2023-01-24 | Stop reason: HOSPADM

## 2023-01-20 RX ADMIN — ISOSORBIDE MONONITRATE 30 MG: 60 TABLET, EXTENDED RELEASE ORAL at 13:10

## 2023-01-20 RX ADMIN — AZITHROMYCIN MONOHYDRATE 500 MG: 500 INJECTION, POWDER, LYOPHILIZED, FOR SOLUTION INTRAVENOUS at 11:02

## 2023-01-20 RX ADMIN — ASPIRIN 81 MG: 81 TABLET, CHEWABLE ORAL at 13:10

## 2023-01-20 RX ADMIN — BARIUM SULFATE 80 ML: 400 SUSPENSION ORAL at 15:34

## 2023-01-20 RX ADMIN — METRONIDAZOLE 500 MG: 500 INJECTION, SOLUTION INTRAVENOUS at 23:02

## 2023-01-20 RX ADMIN — MICONAZOLE NITRATE: 2 POWDER TOPICAL at 10:55

## 2023-01-20 RX ADMIN — BARIUM SULFATE 50 ML: 0.81 POWDER, FOR SUSPENSION ORAL at 15:34

## 2023-01-20 RX ADMIN — CEFTRIAXONE SODIUM 1000 MG: 1 INJECTION, POWDER, FOR SOLUTION INTRAMUSCULAR; INTRAVENOUS at 16:16

## 2023-01-20 RX ADMIN — GUAIFENESIN 600 MG: 600 TABLET ORAL at 10:54

## 2023-01-20 RX ADMIN — MICONAZOLE NITRATE: 2 POWDER TOPICAL at 21:28

## 2023-01-20 RX ADMIN — GUAIFENESIN 600 MG: 600 TABLET ORAL at 21:27

## 2023-01-20 RX ADMIN — METRONIDAZOLE 500 MG: 500 INJECTION, SOLUTION INTRAVENOUS at 13:05

## 2023-01-20 RX ADMIN — FUROSEMIDE 20 MG: 10 INJECTION, SOLUTION INTRAVENOUS at 13:10

## 2023-01-20 RX ADMIN — ENOXAPARIN SODIUM 40 MG: 100 INJECTION SUBCUTANEOUS at 10:54

## 2023-01-20 RX ADMIN — METRONIDAZOLE 500 MG: 500 INJECTION, SOLUTION INTRAVENOUS at 04:10

## 2023-01-20 NOTE — PROGRESS NOTES
Hospitalist Daily Progress Note  Name: Nolan Franco  Age: 80 y.o. Gender: male  CodeStatus: Full Code  Allergies: No Known Allergies    Chief Complaint:Fall    Primary Care Provider: Jake Snyder DO  InpatientTreatment Team: Treatment Team: Attending Provider: Randall Workman DO; Registered Nurse: Ciro Orozco. Maxwell Richardson RN; Utilization Reviewer: Trevor Montes De Oca RN; Consulting Physician: Donato Horner MD  Admission Date: 1/19/2023      Subjective: patient seen and evaluated. pt remains pleasantly confused. Afebrile. Vitals stable. \bmp, lipids, trop ok RVP negative. MBS today    Physical Exam  Constitutional: alert, appears stated age and cooperative  Skin: Skin color, texture, turgor normal. No rashes or lesions  Eyes:Eye: Normal external eye, conjunctiva, FIOR. ENT: Head: Normocephalic, no lesions, without obvious abnormality. Neck: no adenopathy, no carotid bruit, no JVD, supple, symmetrical, trachea midline and thyroid not enlarged, symmetric, no tenderness/mass/nodules  Respiratory: 3 out of 6 systolic ejection murmur with a holosystolic murmur  Cardiovascular: Rales bilaterally worse on the right with occasional rhonchi  Gastrointestinal: soft, non-tender; bowel sounds normal; no masses,  no organomegaly  Genitourinary: Deferred  Musculoskeletal:extremities 3+ pitting edema bilateral lower extremities  Neurologic: Mental status AAOx person and place but not to time or situation no facial asymmetry or droop. Normal muscle strength b/l. Psychiatric: Appropriate mood and affect. Poor insight and judgement  Hematologic: No obvious bruising or bleeding    Review of Systems  14 point ros is reviewed and negative except for above.    Medications:  Reviewed    Infusion Medications:   Scheduled Medications:    aspirin  81 mg Oral Daily    isosorbide mononitrate  30 mg Oral Daily    enoxaparin  40 mg SubCUTAneous Daily    cefTRIAXone (ROCEPHIN) IV  1,000 mg IntraVENous Q24H    And    azithromycin  500 mg IntraVENous Q24H    guaiFENesin  600 mg Oral BID    miconazole   Topical BID    metroNIDAZOLE  500 mg IntraVENous Q8H     PRN Meds: ondansetron **OR** ondansetron, acetaminophen **OR** acetaminophen, ipratropium-albuterol    Labs:   Recent Labs     01/19/23  1045 01/20/23  0553   WBC 16.4* 12.5*   HGB 12.7* 11.5*   HCT 37.7* 34.1*    181     Recent Labs     01/19/23  1045 01/20/23  0806    138   K 4.4 4.2    105   CO2 28 22   BUN 16 16   CREATININE 0.91 0.73   CALCIUM 8.4* 7.8*     Recent Labs     01/19/23  1045   AST 16   ALT 7   BILITOT 1.4*   ALKPHOS 86     No results for input(s): INR in the last 72 hours. Recent Labs     01/20/23  0008 01/20/23  0553   TROPONINI <0.010 <0.010       Urinalysis:   No results found for: Brian Lawrence, BACTERIA, RBCUA, BLOODU, [address], [place] [address]    Radiology:   Most recent    Chest CT      WITH CONTRAST:Results for orders placed during the hospital encounter of 01/19/23    CT CHEST W CONTRAST    Narrative  EXAMINATION:  CT OF THE CHEST WITH CONTRAST 1/19/2023 1:24 pm    TECHNIQUE:  CT of the chest was performed with the administration of intravenous  contrast. Multiplanar reformatted images are provided for review. Automated  exposure control, iterative reconstruction, and/or weight based adjustment of  the mA/kV was utilized to reduce the radiation dose to as low as reasonably  achievable. COMPARISON:  None. HISTORY:  ORDERING SYSTEM PROVIDED HISTORY: cough  TECHNOLOGIST PROVIDED HISTORY:  Reason for exam:->cough  Decision Support Exception - unselect if not a suspected or confirmed  emergency medical condition->Emergency Medical Condition (MA)  What reading provider will be dictating this exam?->CRC    FINDINGS:  Lungs/pleura: Up to approximately 10 cm somewhat rounded dense consolidation  with central air bronchograms, predominantly within the anterior/inferior  aspect of the right upper lobe most consistent with bronchopneumonia.   No  definite underlying mass, organized fluid collection, or significant pleural  effusion. Shallow inspiratory volumes with mild to moderate interstitial  infiltrates, worsening inferiorly. No other significant nodules, within the  limits of the study. Mediastinum: Mild lower mediastinal lymphadenopathy, likely reactive. The  thoracic aorta is moderately a unfolded and ectatic with mild atherosclerotic  plaquing. The heart is borderline enlarged with extensive mitral valve,  aortic valve and coronary artery calcifications and/or stents. No  significant pericardial effusion. Coarsely calcified left hilar lymph nodes. Moderate to large-sized hiatal hernia, without acute complication. Soft Tissues/Bones: Mild-to-moderate degenerative changes and mild chronic  compression deformities of the T1, T2 and T9 vertebral bodies. No worrisome  bone destruction or acute fractures identified. Mild bilateral gynecomastia. Upper Abdomen: Noncontributory. Impression  Dense rounded predominantly right upper lob consolidation, most consistent  with bronchopneumonia. Interstitial infiltrates on low lung volumes, which may be edema and or  interstitial lung disease such as UIP. Mild probably reactive lower mediastinal lymphadenopathy. Moderate to large-sized hiatal hernia and other chronic findings, as noted. RECOMMENDATIONS:  Follow-up imaging to document resolution. WITHOUT CONTRAST: No results found for this or any previous visit. CXR      2-view: Results for orders placed during the hospital encounter of 01/19/23    XR CHEST (2 VW)    Narrative  EXAMINATION:  TWO XRAY VIEWS OF THE CHEST    1/19/2023 11:36 am    COMPARISON:  None. HISTORY:  ORDERING SYSTEM PROVIDED HISTORY: cough  TECHNOLOGIST PROVIDED HISTORY:  Reason for exam:->cough  What reading provider will be dictating this exam?->CRC    FINDINGS:  Two views the chest were obtained. The heart is enlarged.     There is a masslike opacity seen within the left perihilar region measuring  5.5 x 4.2 cm. No right pleural effusion is noted. The left lung is clear. Note is made of a hiatal hernia. Impression  5.5 x 4.2 cm masslike opacity seen within the right perihilar region likely  representing pneumonia. Given that there is a masslike appearance dedicated  CT of the chest is recommended to exclude underlying pathology. Portable: Results for orders placed during the hospital encounter of 09/02/22    XR CHEST PORTABLE    Narrative  EXAMINATION:  PORTABLE CHEST RADIOGRAPH. CLINICAL HISTORY:  TRAUMA. COMPARISONS:  NONE AVAILABLE    TECHNIQUE:  Frontal view chest.    FINDINGS:  Heart is enlarged. Rounded density projected over the lower thoracic midline could represent either tortuous aorta or large esophageal hiatal hernia. Nodular calcifications overlying the left hilum probably representing chronic granulomatous  jael residual.    Horizontal linear density near the lateral left lung base probably represents linear scar or atelectasis. There may be mild atelectasis at either lung base. Remaining lung fields are otherwise clear. There is no significant pleural fluid or pneumothorax. Osseous structures are grossly intact. Impression  1. CARDIOMEGALY. 2. MILD BASILAR ATELECTASIS. 3. POSSIBLE LARGE ESOPHAGEAL HIATAL HERNIA. 4. NO OTHER SIGNIFICANT ACUTE INTRATHORACIC ABNORMALITY. Echo No results found for this or any previous visit. Assessment/Plan:    Active Hospital Problems    Diagnosis Date Noted    Community acquired pneumonia due to Mycoplasma pneumoniae [J15.7] 01/19/2023     Priority: Medium     Acute bacterial pneumonia: Concern for aspiration actually given coughing with meals continue ceftriaxone azithromycin Flagyl for anaerobic coverage. Sputum culture urine strep urine Legionella   antigen check respiratory viral panel. Add Mucinex inhaled bronchodilators Acapella incentive spirometry.   Trend procalcitonin for antibiotic de-escalation     Lower extremity edema/ diastolic heart failure: cardiology following pHTN with diffuse valve disease. Gentle hydration, terry hose, elevate the legs as tolerated. Continue daily weights intake and output monitoring low-sodium fluid restricted diet trend troponins     Dementia: Supportive care  Diffuse valvular disease: As above repeat echocardiogram    Hyperlipidemia with history of carotid stenosis: asa   DVT prophylaxis Lovenox       Additional work up or/and treatment plan may be added today or then after based on clinical progression. I am managing a portion of pt care. Some medical issues are handled byother specialists. Additional work up and treatment should be done in out pt setting by pt PCP and other out pt providers. In addition to examining and evaluating pt, I spent additional time explaining care, normaland abnormal findings, and treatment plan. All of pt questions were answered. Counseling, diet and education were provided. Case will be discussed with nursing staff when appropriate. Family will be updated if and whenappropriate.       Electronically signed by Pernell Britton DO on 1/20/2023 at 3:29 PM

## 2023-01-20 NOTE — CARE COORDINATION
[facility] [place] [facility] with call back number left [phone] ext [phone]. Returned call and left vm.

## 2023-01-20 NOTE — CONSULTS
Cardiology Consult Note      Date:   1/20/2023  Patient name:  Demar Valle  Date of admission:  1/19/2023 10:38 AM  MRN:   [id number]  YOB: 1932  Time of Consult:  8:24 AM    Consulting Cardiologist: Dr. Luis Mccauley APRN-CNP  Primary Cardiologist:   Dr. Risa Griffin MD/[facility] physicians [facility] cardiology      Referring Provider: Dr. Esther Leon MD    Consult Reason:  Possible heart failure    Assessment  Edema: Significant bilateral lower extremity edema. I was able to find in previous cardiology notes through care everywhere that he has a history of bilateral lower extremity edema. There may be some element of CHF contributing pending echocardiogram and BMP. Pneumonia; CT of the chest showed a dense rounded predominantly right upper lobe consolidation, most consistent with bronchial pneumonia. Interstitial infiltrates on low lung volumes which may be edema and/or interstitial lung disease. concerns for aspiration pneumonia. Arctic valve stenosis; pending echocardiogram  Dementia; he is able to state that he is in a hospital and knows who he is unable to recall date and time. Unfortunately ROS is unreliable. Plan  1. Monitor on telemetry  2. Pending echocardiogram  3. The recommendations pending echocardiogram  4. The recommendations per Dr. Sima Monsalve      HPI:   Demar Valle is a 80 y.o.  male patient who is being at the request of Dr. Kalani Schilling for inpatient consultation of CHF. He was admitted on 1/19/2023. Previous [address] and [address] [facility] records have been reviewed in detail. 80 yr old male with a PMH of dyslipidemia, carotid stenosis with left carotid endarterectomy, aortic valve stenosis, mitral valve insufficiency, claudication, dementia hat presented to [address] [facility] ER 1/19/2023 after falling out of bed. Stating that he did not hit his head or lose consciousness.   Per ER notes his daughter stated  that he has been having cough, fever, and fatigue for approximately 1 week. Chest x-ray showed 5.5x 4.2 centimeters masslike opacity seen within the right peripheral lower region likely representing pneumonia. CT of the chest showed a dense rounded predominantly right upper lobe consolidation, most consistent with bronchial pneumonia. Interstitial infiltrates on low lung volumes which may be edema and/or interstitial lung disease. Moderate to large size hiatal hernia. Abnormal labs WBC 16.4, Hgb/HCT 12.7/37.7. Cardiac History/Testing:  10/10/2018 echocardiogram; LVEF 18%, stage I diastolic dysfunction, left atrium mildly dilated, mitral valve shows severe posterior mitral annular calcification with persistent restricted posterior leaflet with moderate MR. No PFO      Allergies:  No Known Allergies    Past Medical History:  History reviewed. No pertinent past medical history. Past Surgical History:  History reviewed. No pertinent surgical history. Family History:   History reviewed. No pertinent family history.     Social  History:     Social History     Tobacco Use    Smoking status: Never    Smokeless tobacco: Never   Substance Use Topics    Alcohol use: Never       Home Medications:    Prior to Admission medications    Not on File       Current Medications:      IV Medications:  Current Facility-Administered Medications   Medication Dose Route Frequency Provider Last Rate Last Admin    ondansetron (ZOFRAN-ODT) disintegrating tablet 4 mg  4 mg Oral Q8H PRN Lucetta Gloss Sedar, DO        Or    ondansetron (ZOFRAN) injection 4 mg  4 mg IntraVENous Q6H PRN Lucsharath Gloss Sedar, DO        acetaminophen (TYLENOL) tablet 650 mg  650 mg Oral Q6H PRN Lucsharath Gloss Sedar, DO        Or    acetaminophen (TYLENOL) suppository 650 mg  650 mg Rectal Q6H PRN Lucsharath Gloss Sedar, DO        enoxaparin (LOVENOX) injection 40 mg  40 mg SubCUTAneous Daily Jeffrey Estevez, DO   40 mg at 01/19/23 1812    ipratropium-albuterol (DUONEB) nebulizer solution 1 ampule  1 ampule Inhalation Q4H PRN Krystian Gloss Sedar, DO        cefTRIAXone (ROCEPHIN) 1,000 mg in dextrose 5 % 50 mL IVPB mini-bag  1,000 mg IntraVENous Q24H Jeffrey RODRÍGUEZ Sedar, DO        And    azithromycin (ZITHROMAX) 500 mg in D5W 250ml addavial  500 mg IntraVENous Q24H Thermeagan Carrillo Sedar, DO        guaiFENesin [facility]) extended release tablet 600 mg  600 mg Oral BID Thersia Lorena Sedar, DO   600 mg at 01/19/23 2056    miconazole (MICOTIN) 2 % powder   Topical BID Thersia Lorena Sedar, DO   Given at 01/19/23 2055    metronidazole (FLAGYL) 500 mg in 0.9% NaCl 100 mL IVPB premix  500 mg IntraVENous Q8H Jeffrey RODRÍGUEZ Sedar, DO   Stopped at 01/20/23 0510       ROS:     Review of Systems  Unable to obtain due to progressive dementia    Vital Signs:  Vitals:    01/19/23 1205 01/19/23 1230 01/19/23 1300 01/19/23 1531   BP:  126/65 128/63 134/65   Pulse:  (!) 115 (!) 116 (!) 108   Resp:    18   Temp:    99.7 °F (37.6 °C)   TempSrc:    Oral   SpO2: 91% 92% 90% 90%   Weight:    203 lb 1.6 oz (92.1 kg)   Height:    6' (1.829 m)       Intake/Output Summary (Last 24 hours) at 1/20/2023 7374  Last data filed at 1/20/2023 0410  Gross per 24 hour   Intake 120 ml   Output 500 ml   Net -380 ml       Patient Vitals for the past 96 hrs (Last 3 readings):   Weight   01/19/23 1531 203 lb 1.6 oz (92.1 kg)   01/19/23 1042 220 lb (99.8 kg)       Physical exam   Constitutional:  Well developed, awake/alert/, no distress, alert and cooperative. Eyes:  PERRL, sclera clear, conjunctiva pink. EMMT:  mucous membranes  moist, no apparent injury, no lesion seen. Head/Neck:  Neck supple, no apparent injury, trachea midline, no bruits. Respiratory/Thorax: Rhonchi throughout  Cardiovascular: Regular, rate and rhythm, 3/6 systolic murmurs,   normal S1 and S2, PMI non displaced. Gastrointestinal:  Non distended, soft, non-tender, no rebound tenderness or guarding, Genitourinary:  deferred  Musculoskeletal:  No apparent injury. Extremities: 3+ bilateral lower extremity edema.   no cyanosis, contusions or wounds, no clubbing. Neurological:  Alert person and place moves extremities spontaneous with purpose  Psychological:  Appropriate mood and behavior  Skin:  Warm and dry,  no lesions or rashes. Diagnostics:    [id number]  Normal sinus rhythm  93 bpm  QT/QTc 362/450    Telemetry:  SR .    Echo:  Pending    Lab Data:  BMP:  Recent Labs     23  1045      K 4.4      CO2 28   BUN 16   CREATININE 0.91   LABGLOM >60.0       CBC:  Recent Labs     23  1045 23  0553   WBC 16.4* 12.5*   RBC 4.10* 3.68*   HGB 12.7* 11.5*   HCT 37.7* 34.1*   MCV 92.1 92.7*   MCH 31.0 31.2   MCHC 33.7 33.7   RDW 14.3 14.2     --        Cardiac Enzymes:   Recent Labs     23  0008 23  0553   TROPONINI <0.010 <0.010       Hepatic Function Panel:  Recent Labs     23  1045   ALKPHOS 86   ALT 7   AST 16   PROT 6.8   BILITOT 1.4*   LABALBU 3.5       Magnesium:  No results for input(s): MG in the last 72 hours. Pro-BNP:  No results found for: PROBNP    INR:  No results for input(s): PROTIME, INR in the last 72 hours. TSH:  No results found for: TSH    Lipid Profile:  Lab Results   Component Value Date/Time    TRIG 59 2023 06:19 PM    HDL 37 2023 06:19 PM    LDLCALC 72 2023 06:19 PM       HgbA1C:  No results found for: LABA1C    Lactate Level:  No results for input(s): LACTA in the last 72 hours. CMP:  Recent Labs     23  1045      K 4.4      CO2 28   BUN 16   CREATININE 0.91   GLUCOSE 157*   CALCIUM 8.4*   PROT 6.8   LABALBU 3.5   BILITOT 1.4*   ALKPHOS 86   AST 16   ALT 7       Amylase:  No results for input(s): AMYLASE in the last 72 hours. Lipase:  No results for input(s): LIPASE in the last 72 hours. ABG:  No results for input(s): PH, PO2, PCO2, HCO3, BE, O2SAT in the last 72 hours. Radiology:   CT CHEST W CONTRAST   Final Result   Dense rounded predominantly right upper lob consolidation, most consistent   with bronchopneumonia. Interstitial infiltrates on low lung volumes, which may be edema and or   interstitial lung disease such as UIP. Mild probably reactive lower mediastinal lymphadenopathy. Moderate to large-sized hiatal hernia and other chronic findings, as noted. RECOMMENDATIONS:   Follow-up imaging to document resolution. XR CHEST (2 VW)   Final Result   5.5 x 4.2 cm masslike opacity seen within the right perihilar region likely   representing pneumonia. Given that there is a masslike appearance dedicated   CT of the chest is recommended to exclude underlying pathology. Impression:   Principal Problem:    Community acquired pneumonia due to Mycoplasma pneumoniae  Resolved Problems:    * No resolved hospital problems. *    Patient Active Problem List   Diagnosis    Community acquired pneumonia due to Mycoplasma pneumoniae         Thank you for the opportunity to participate in the care of your patient. Do not hesitate to call if you have any questions.     Electronically signed by AZEB Cali CNP, [facility] on 1/20/2023 at 8:24 AM

## 2023-01-20 NOTE — H&P
Hospital Medicine  History and Physical    Patient:  Eric Pereyra  MRN: [id number]    CHIEF COMPLAINT:    Chief Complaint   Patient presents with    Fall       History Obtained From:  patient, family member -daughter  Primary Care Physician: Joby Agudelo DO    HISTORY OF PRESENT ILLNESS:   The patient is a 80 y.o. male who presents with worsening dementia, confusion, wheezing coughing. Patient is a 80-year-old male he has a history of rather progressive dementia currently living at home with his wife. He is brought in by daughter with persistent cough fevers and fatigue. Patient did have a chest x-ray in the ED showing perihilar infiltrates suggestive of pneumonia or concerns for possible masslike opacification to it however and CT chest was performed. Results are also supportive of a bacterial pneumonia. Patient was started on antibiotic coverage with ceftriaxone azithromycin and start given an IV fluid bolus in the ED. Patient was admitted to the medical service     Other history is obtained from daughter over the phone as the patient is demented unable to provide history he is apparently had a rather progressive dementia with increasing cough with meals. He is still living at home with his wife but minimally active mostly spends his time in the chair or in bed. 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 He does not smoke drink or use any drugs no recent travel. No pets at home. Past Medical History:  Dementia, aortic stenosis mitral regurgitation dyslipidemia    Past Surgical History:  History reviewed. No pertinent surgical history. Medications Prior to Admission:    Daughter will bring home medications in the morning, patient cannot provide medication history    Allergies:  Patient has no known allergies. Social History:   TOBACCO:   reports that he has never smoked.  He has never used smokeless tobacco.  ETOH:   reports no history of alcohol use.  OCCUPATION: Retired     Family History:   History reviewed. No pertinent family history. REVIEW OF SYSTEMS:  Ten systems reviewed and negative except for as above. Physical Exam:    Vitals: /65   Pulse (!) 108   Temp 99.7 °F (37.6 °C) (Oral)   Resp 18   Ht 6' (1.829 m)   Wt 203 lb 1.6 oz (92.1 kg)   SpO2 90%   BMI 27.55 kg/m²   Constitutional: alert, appears stated age and cooperative  Skin: Skin color, texture, turgor normal. No rashes or lesions  Eyes:Eye: Normal external eye, conjunctiva, FIOR. ENT: Head: Normocephalic, no lesions, without obvious abnormality. Neck: no adenopathy, no carotid bruit, no JVD, supple, symmetrical, trachea midline and thyroid not enlarged, symmetric, no tenderness/mass/nodules  Respiratory: 3 out of 6 systolic ejection murmur with a holosystolic murmur  Cardiovascular: Rales bilaterally worse on the right with occasional rhonchi  Gastrointestinal: soft, non-tender; bowel sounds normal; no masses,  no organomegaly  Genitourinary: Deferred  Musculoskeletal:extremities 3+ pitting edema bilateral lower extremities  Neurologic: Mental status AAOx person and place but not to time or situation no facial asymmetry or droop. Normal muscle strength b/l. Psychiatric: Appropriate mood and affect. Poor insight and judgement  Hematologic: No obvious bruising or bleeding    Recent Labs     01/19/23  1045   WBC 16.4*   HGB 12.7*        Recent Labs     01/19/23  1045      K 4.4      CO2 28   BUN 16   CREATININE 0.91   GLUCOSE 157*   AST 16   ALT 7   BILITOT 1.4*   ALKPHOS 86   -------------   XR CHEST (2 VW)    Result Date: 1/19/2023  EXAMINATION: TWO XRAY VIEWS OF THE CHEST 1/19/2023 11:36 am COMPARISON: None. HISTORY: ORDERING SYSTEM PROVIDED HISTORY: cough TECHNOLOGIST PROVIDED HISTORY: Reason for exam:->cough What reading provider will be dictating this exam?->CRC FINDINGS: Two views the chest were obtained.   The heart is enlarged. There is a masslike opacity seen within the left perihilar region measuring 5.5 x 4.2 cm. No right pleural effusion is noted. The left lung is clear. Note is made of a hiatal hernia. 5.5 x 4.2 cm masslike opacity seen within the right perihilar region likely representing pneumonia. Given that there is a masslike appearance dedicated CT of the chest is recommended to exclude underlying pathology. CT CHEST W CONTRAST    Result Date: 1/19/2023  EXAMINATION: CT OF THE CHEST WITH CONTRAST 1/19/2023 1:24 pm TECHNIQUE: CT of the chest was performed with the administration of intravenous contrast. Multiplanar reformatted images are provided for review. Automated exposure control, iterative reconstruction, and/or weight based adjustment of the mA/kV was utilized to reduce the radiation dose to as low as reasonably achievable. COMPARISON: None. HISTORY: ORDERING SYSTEM PROVIDED HISTORY: cough TECHNOLOGIST PROVIDED HISTORY: Reason for exam:->cough Decision Support Exception - unselect if not a suspected or confirmed emergency medical condition->Emergency Medical Condition (MA) What reading provider will be dictating this exam?->CRC FINDINGS: Lungs/pleura: Up to approximately 10 cm somewhat rounded dense consolidation with central air bronchograms, predominantly within the anterior/inferior aspect of the right upper lobe most consistent with bronchopneumonia. No definite underlying mass, organized fluid collection, or significant pleural effusion. Shallow inspiratory volumes with mild to moderate interstitial infiltrates, worsening inferiorly. No other significant nodules, within the limits of the study. Mediastinum: Mild lower mediastinal lymphadenopathy, likely reactive. The thoracic aorta is moderately a unfolded and ectatic with mild atherosclerotic plaquing. The heart is borderline enlarged with extensive mitral valve, aortic valve and coronary artery calcifications and/or stents.   No significant pericardial effusion. Coarsely calcified left hilar lymph nodes. Moderate to large-sized hiatal hernia, without acute complication. Soft Tissues/Bones: Mild-to-moderate degenerative changes and mild chronic compression deformities of the T1, T2 and T9 vertebral bodies. No worrisome bone destruction or acute fractures identified. Mild bilateral gynecomastia. Upper Abdomen: Noncontributory. Dense rounded predominantly right upper lob consolidation, most consistent with bronchopneumonia. Interstitial infiltrates on low lung volumes, which may be edema and or interstitial lung disease such as UIP. Mild probably reactive lower mediastinal lymphadenopathy. Moderate to large-sized hiatal hernia and other chronic findings, as noted. RECOMMENDATIONS: Follow-up imaging to document resolution. EKG: Ordered, pending    Assessment and Plan   Acute bacterial pneumonia: Concern for aspiration actually given coughing with meals continue ceftriaxone azithromycin added Flagyl for anaerobic coverage. Sputum culture urine strep urine Legionella   antigen check respiratory viral panel. Add Mucinex inhaled bronchodilators Acapella incentive spirometry. Trend procalcitonin for antibiotic de-escalation    Lower extremity edema likely dependent edema from inactivity however given history of valvular disease would benefit from repeat echocardiogram consult cardiology and evaluation of volume overload. Patient may be developing some right-sided heart failure [facility] has been requested by family.   We will follow daily weights intake and output monitoring low-sodium fluid restricted diet trend troponins    Dementia: Supportive care  Diffuse valvular disease: As above repeat echocardiogram  Hyperlipidemia: Within normal limits given age especially considering the patient is on any regimen hold off on any further statin given concerns for aspiration  DVT prophylaxis Lovenox    Patient Active Problem List   Diagnosis Code Community acquired pneumonia due to Mycoplasma pneumoniae J15.7       Artie Sands DO  Admitting Hospitalist    Emergency Contact:

## 2023-01-20 NOTE — PROCEDURES
[facility]   Facility/Department: [facility] [facility]  Speech Language Pathology  Modified Barium Swallow (MBS)    Payton Hayes  : 1932 (80 y.o.)   [x]   confirmed    MRN: [id number]  ROOM: [id number]/S385-04  ADMISSION DATE: 2023  PATIENT DIAGNOSIS(ES): Hypoxia [R09.02]  Community acquired pneumonia due to Mycoplasma pneumoniae [J15.7]  Community acquired pneumonia of right middle lobe of lung [J18.9]  Chief Complaint   Patient presents with    Fall     Patient Active Problem List    Diagnosis Date Noted    Community acquired pneumonia due to Mycoplasma pneumoniae 2023     History reviewed. No pertinent past medical history. History reviewed. No pertinent surgical history. No Known Allergies    Date of Onset:  2023        Date of Evaluation: 2023   Evaluating Therapist: JUNIOR Ritchie      DYSPHAGIA DIAGNOSIS:  Mild to moderate oral and pharyngeal dysphagia    DIET RECOMMENDATIONS:  Solid consistency: Soft and Bite Sized  Liquid consistency: Mildly Thick  Liquid administration via: Cup  Medication administration: Whole in puree  Supervision: Close    Compensatory Swallowing Strategies:   Upright positioning  Small bites/sips  Double swallow  Eat/Feed Slowly  Remain upright for 30-45 minutes   No straws    Reason for Referral  Darren Chan was referred for a modified barium swallow evaluation to assess the efficiency of his swallow function, assess for aspiration, and make recommendations regarding safe dietary consistencies, effective compensatory strategies, and safe eating environment. GENERAL  Patient complaints/Staff concerns:   Per MD report family noted coughing during meals at home. CXR 2023  Dense rounded predominantly right upper lob consolidation, most consistent   with bronchopneumonia. Interstitial infiltrates on low lung volumes, which may be edema and or   interstitial lung disease such as UIP.        Mild probably reactive lower mediastinal lymphadenopathy. Moderate to large-sized hiatal hernia and other chronic findings, as noted. Previous swallow testing:  NONE    Cognitive status:   Cognitive Deficits    Communication observation:   Functional    Follows Directions:   Yes, Requires verbal cueing , and Other:    Diet Level Prior to this Evaluation:    Minced and Moist  Thin    Current respiratory status:      Room air  SpO2: 93 % (01/20/23 0830)    Baseline Vocal Quality:  Normal    Oral Hygiene:  Clean and Moist     Dentition:  Adequate    Oral mechanism examination:  Labial: No impairment  Lingual: Decreased strength, Decreased ROM, and Decreased coordination      PROCEDURE   Patient Positioning: Seated 70-90°  Viewing Plane(s): Lateral  Contrast: commercially prepared, non-standardized barium viscosities; ranging from thin liquid to pudding consistency  Consistencies Administered and # of Trials:  Puree x 3 trials, Regular solid x 2 trials, Thin x 4 trials, and Mildly thick x 3 trials  How Presented:  Self fed      ORAL PHASE:  Bolus Acceptance:   No impairment    Bolus Formation/Control:   Slow/prolonged mastication: Regular solid  Decreased bolus cohesion: All trialed consistencies  Premature bolus loss to pharynx: All trialed consistencies    Bolus Propulsion:  Lingual pumping: All trialed consistencies  Reduced posterior propulsion: All trialed consistencies  Discoordination: All trialed consistencies  Increased time and effort: Puree and Regular solid  Piecemeal: Puree and Regular solid    Oral Residue:   Superior tongue: All trialed consistencies    Response to Oral Residue:  Mostly cleared with re-swallow Independently with delay       ORAL STAGE IMPRESSION: Mild to moderate oral dysphagia was noted characterized by premature pharyngeal entry with all consistencies with thin liquids fallowing to the level of the pyriform sinus.  Prolonged mastication was noted with regular solids with piecemeal swallow occurring with puree solids and cookie trials. Patient presented with oral residue varying from trace to mild to moderate amount along the tongue, palate and base of tongue. Consistent delayed double swallow occurred, which reduced and/or cleared residue. PHARYNGEAL STAGE:   Soft Palate Elevation:  No impairment    Initiation of Pharyngeal Swallow:  Valleculae: Puree and Regular solid  Pyriform sinuses: Thin and Mildly thick/[place]    Tongue Base Retraction:  No impairment    Laryngeal Elevation:  No impairment    Anterior Hyoid Excursion:  No impairment    Epiglottic Inversion:  Complete    Laryngeal Vestibule Closure:  Complete    Pharyngeal Contraction:  No impairment    Pharyngeal Residue:  Tongue base residue: All trialed consistencies  Vallecular residue: All trialed consistencies    Response to Residue:  Mostly cleared with re-swallow Independently with delay    Laryngeal Penetration      Before the swallow: Thin from cup      During the swallow: Thin from cup and Mildly Thick (Nectar) from cup    Response to Laryngeal Penetration  Automatically cleared: Thin from cup and Mildly Thick (Nectar) from cup    Aspiration      During the swallow: Thin from cup    Response to Aspiration:  Immediate throat clear:  Thin from cup    Effective Penetration/Aspiration Elimination Strategies:  None    Ineffective Penetration/Aspiration Elimination Strategies:  None        Penetration-Aspiration Scale  Puree, regular 1 Material does not enter the airway   nectar 2 Material enters the airway, remains above the vocal folds, and is ejected from the airway    3 Material enters the airway, remains above the vocal folds, and is not ejected from the airway   thin 4 Material enters the airway, contacts the vocal folds, and is ejected from the airway    5 Material enters the airway, contacts the vocal folds, and is not ejected from the airway    6 Material enters the airway, passes below the vocal folds and is ejected into the larynx or out of the airway   thin 7 Material enters the airway, passes below the vocal folds, and is not ejected from the trachea despite effort    8 Material enters the airway, passes below the vocal folds, and no effort is made to eject. PHARYNGEAL STAGE IMPRESSION:  Mild to moderate pharyngeal dysphagia was noted characterized by aspiration with thin liquids via large sip with immediate weak throat clear, but unsuccessful in ejecting aspirated material. Consistent shallow to mid depth penetration occurred with nectar thick liquids with immediate ejection. Consistent penetration occurred with thin liquids to the level of the vocal cords with immediate ejection. Patient presented with base of tongue and vallecular residue, which were cleared with delayed double swallow. Delayed initiation of the swallow occurred with all po trials. Patient demonstrated difficulty consuming small sips when verbally cued with repetition. Coughing was noted prior to and after the MBS    CERVICAL ESOPHAGEAL STAGE :   No impairment observed            THERAPY RECOMMENDATIONS:   Therapy is recommended to:  Assess tolerance of recommended diet  Improve oral motor strength and range of motion  Improve tongue base retraction      Nursing notified: Lawrence Otto OF CARE:   Long Term Goals:    Patient will tolerate least restrictive diet with no overt s/s of difficulty or aspiration. Short Term Goals:   Pt to be seen 2-3x/week during LOS or until goals met    1. Pt will complete oral motor ROM and strengthening exercises (labial/buccal) with 90% accuracy, in order to strengthen lingual/labial/buccal musculature to promote safety and efficiency of oral phase of swallow and decrease risk for pocketing. 2.  Pt will complete lingual ROM and strengthening exercises (lingual press, lingual pull backs) with 90% accuracy, in order to promote anterior to posterior propulsion of bolus and improve tongue base retraction.    3.   Pt will tolerate therapeutic trials of easy to chew with adequate mastication and oral clearance of bolus with no overt s/s of aspiration on 100% trials. 4.  Pt will tolerate the recommended diet level with no s/s of aspiration. Prognosis for improvements is: Fair, Participation Level and Prior Level of Function      Pain Assessment:  Patient does not c/o pain. Pain Re-assessment:  Patient does not c/o pain. Safety Devices: All fall risk precautions in place      Radiologist:  Available on Consult as needed, Radiology Tech present    Treatment goals were discussed with the:   Patient. , who gives fair understanding of recommendations. Caregiver. , who gives verbal understanding of recommendations. Thank you for your referral.  Please direct any questions or concerns to Speech Pathology. Images are available through [facility]/PACS. Please see radiologist report for additional details.       Therapy Time   Time in: 1527  Time out: 1544      17 minutes      Signature:  Electronically signed by JUNIOR Forman on 1/20/2023 at 4:17 PM

## 2023-01-20 NOTE — CARE COORDINATION
Patient oriented to self and place only at this time. No family present. Pneumonia booklet and zone pamphlet left at bedside for family review. Possible CHF noted by cardiology CNP. Will monitor and teach family if/when actual diagnosis made.   Electronically signed by Lee Shook RN on 1/20/2023 at 10:13 AM

## 2023-01-20 NOTE — PROGRESS NOTES
[facility] Res[facility] Therapy Evaluation   Current Order:  DUONEB Q4 PRN      Home Regimen: NONE      Ordering Physician: SERG  Re-evaluation Date:  N/A     Diagnosis: FALL, PNEUMONIA      Patient Status: Stable / Unstable + Physician notified    The following MDI Criteria must be met in order to convert aerosol to MDI with spacer.  If unable to meet, MDI will be converted to aerosol:  []  Patient able to demonstrate the ability to use MDI effectively  []  Patient alert and cooperative  []  Patient able to take deep breath with 5-10 second hold  []  Medication(s) available in this delivery method   []  Peak flow greater than or equal to 200 ml/min            Current Order Substituted To  (same drug, same frequency)   Aerosol to MDI [] Albuterol Sulfate 0.083% unit dose by aerosol Albuterol Sulfate MDI 2 puffs by inhalation with spacer    [] Levalbuterol 1.25 mg unit dose by aerosol Levalbuterol MDI 2 puffs by inhalation with spacer    [] Levalbuterol 0.63 mg unit dose by aerosol Levalbuterol MDI 2 puffs by inhalation with spacer    [] Ipratropium Bromide 0.02% unit dose by aerosol Ipratropium Bromide MDI 2 puffs by inhalation with spacer    [] Duoneb (Ipratropium + Albuterol) unit dose by aerosol Ipratropium MDI + Albuterol MDI 2 puffs by inhalation w/spacer   MDI to Aerosol [] Albuterol Sulfate MDI Albuterol Sulfate 0.083% unit dose by aerosol    [] Levalbuterol MDI 2 puffs by inhalation Levalbuterol 1.25 mg unit dose by aerosol    [] Ipratropium Bromide MDI by inhalation Ipratropium Bromide 0.02% unit dose by aerosol    [] Combivent (Ipratropium + Albuterol) MDI by inhalation Duoneb (Ipratropium + Albuterol) unit dose by aerosol       Treatment Assessment [Frequency/Schedule]:  Change frequency to: _____________NO CHANGES TO CURRENT ORDER_____________________________________per Protocol, P&T, MEC      Points 0 1 2 3 4   Pulmonary Status  Non-Smoker  [x]   Smoking history   < 20 pack years  []   Smoking history  ?  20 pack years  []   Pulmonary Disorder  (acute or chronic)  []   Severe or Chronic w/ Exacerbation  []     Surgical Status No [x]   Surgeries     General []   Surgery Lower []   Abdominal Thoracic or []   Upper Abdominal Thoracic with  PulmonaryDisorder  []     Chest X-ray Clear/Not  Ordered     []  Chronic Changes  Results Pending  []  Infiltrates, atelectasis, pleural effusion, or edema  [x]  Infiltrates in more than one lobe []  Infiltrate + Atelectasis, &/or pleural effusion  []    Respiratory Pattern Regular,  RR = 12-20 [x]  Increased,  RR = 21-25 []  NUNEZ, irregular,  or RR = 26-30 []  Decreased FEV1  or RR = 31-35 []  Severe SOB, use  of accessory muscles, or RR ? 35  []    Mental Status Alert, oriented,  Cooperative [x]  Confused but Follows commands []  Lethargic or unable to follow commands []  Obtunded  []  Comatose  []    Breath Sounds Clear to  auscultation  []  Decreased unilaterally or  in bases only [x]  Decreased  bilaterally  []  Crackles or intermittent wheezes []  Wheezes []    Cough Strong, Spontan., & nonproductive [x]  Strong,  spontaneous, &  productive []  Weak,  Nonproductive []  Weak, productive or  with wheezes []  No spontaneous  cough or may require suctioning []    Level of Activity Ambulatory []  Ambulatory w/ Assist  [x]  Non-ambulatory []  Paraplegic []  Quadriplegic []    Total    Score:__4_____     Triage Score:____5____      Tri       Triage:     1. (>20) Freq: Q3    2. (16-20) Freq: Q4   3. (11-15) Freq: QID & Albuterol Q2 PRN    4. (6-10) Freq: TID & Albuterol Q2 PRN    5. (0-5) Freq Q4prn

## 2023-01-20 NOTE — PROGRESS NOTES
Pt assessed early this AM.  Occasional moist cough noted. Pt receiving routine ordered IV antibiotics. Pt had MBS today. Call from [address] [place] [address]  who recommends soft diet with nectar thick liq. Diet ordered. Pts spouse and daughter present at the bedside, updated at this time. Pt safety maintained throughout this shift.

## 2023-01-21 LAB
ANION GAP SERPL CALCULATED.3IONS-SCNC: 12 MEQ/L (ref 9–15)
BASOPHILS ABSOLUTE: 0.1 K/UL (ref 0–0.2)
BASOPHILS RELATIVE PERCENT: 0.5 %
BUN BLDV-MCNC: 21 MG/DL (ref 8–23)
CALCIUM SERPL-MCNC: 8 MG/DL (ref 8.5–9.9)
CHLORIDE BLD-SCNC: 102 MEQ/L (ref 95–107)
CO2: 25 MEQ/L (ref 20–31)
CREAT SERPL-MCNC: 0.96 MG/DL (ref 0.7–1.2)
EOSINOPHILS ABSOLUTE: 0.4 K/UL (ref 0–0.7)
EOSINOPHILS RELATIVE PERCENT: 3.6 %
GFR SERPL CREATININE-BSD FRML MDRD: >60 ML/MIN/{1.73_M2}
GLUCOSE BLD-MCNC: 122 MG/DL (ref 70–99)
HCT VFR BLD CALC: 34.8 % (ref 42–52)
HEMOGLOBIN: 11.6 G/DL (ref 14–18)
LYMPHOCYTES ABSOLUTE: 1.6 K/UL (ref 1–4.8)
LYMPHOCYTES RELATIVE PERCENT: 13.5 %
MCH RBC QN AUTO: 30.4 PG (ref 27–31.3)
MCHC RBC AUTO-ENTMCNC: 33.2 % (ref 33–37)
MCV RBC AUTO: 91.4 FL (ref 79–92.2)
MONOCYTES ABSOLUTE: 1.2 K/UL (ref 0.2–0.8)
MONOCYTES RELATIVE PERCENT: 10.2 %
NEUTROPHILS ABSOLUTE: 8.6 K/UL (ref 1.4–6.5)
NEUTROPHILS RELATIVE PERCENT: 72.2 %
PDW BLD-RTO: 14.2 % (ref 11.5–14.5)
PLATELET # BLD: 224 K/UL (ref 130–400)
POTASSIUM REFLEX MAGNESIUM: 3.9 MEQ/L (ref 3.4–4.9)
PROCALCITONIN: 0.13 NG/ML (ref 0–0.15)
RBC # BLD: 3.81 M/UL (ref 4.7–6.1)
SODIUM BLD-SCNC: 139 MEQ/L (ref 135–144)
WBC # BLD: 11.9 K/UL (ref 4.8–10.8)

## 2023-01-21 PROCEDURE — 85025 COMPLETE CBC W/AUTO DIFF WBC: CPT

## 2023-01-21 PROCEDURE — 2580000003 HC RX 258: Performed by: INTERNAL MEDICINE

## 2023-01-21 PROCEDURE — 6370000000 HC RX 637 (ALT 250 FOR IP): Performed by: INTERNAL MEDICINE

## 2023-01-21 PROCEDURE — 1210000000 HC MED SURG R&B

## 2023-01-21 PROCEDURE — 36415 COLL VENOUS BLD VENIPUNCTURE: CPT

## 2023-01-21 PROCEDURE — 6360000002 HC RX W HCPCS: Performed by: INTERNAL MEDICINE

## 2023-01-21 PROCEDURE — 84145 PROCALCITONIN (PCT): CPT

## 2023-01-21 PROCEDURE — 2500000003 HC RX 250 WO HCPCS: Performed by: INTERNAL MEDICINE

## 2023-01-21 PROCEDURE — 80048 BASIC METABOLIC PNL TOTAL CA: CPT

## 2023-01-21 RX ADMIN — GUAIFENESIN 600 MG: 600 TABLET ORAL at 10:01

## 2023-01-21 RX ADMIN — GUAIFENESIN 600 MG: 600 TABLET ORAL at 21:59

## 2023-01-21 RX ADMIN — ENOXAPARIN SODIUM 40 MG: 100 INJECTION SUBCUTANEOUS at 10:01

## 2023-01-21 RX ADMIN — MICONAZOLE NITRATE: 2 POWDER TOPICAL at 10:03

## 2023-01-21 RX ADMIN — METRONIDAZOLE 500 MG: 500 INJECTION, SOLUTION INTRAVENOUS at 13:58

## 2023-01-21 RX ADMIN — METRONIDAZOLE 500 MG: 500 INJECTION, SOLUTION INTRAVENOUS at 03:20

## 2023-01-21 RX ADMIN — ASPIRIN 81 MG: 81 TABLET, CHEWABLE ORAL at 10:01

## 2023-01-21 RX ADMIN — CEFTRIAXONE SODIUM 1000 MG: 1 INJECTION, POWDER, FOR SOLUTION INTRAMUSCULAR; INTRAVENOUS at 15:12

## 2023-01-21 RX ADMIN — METRONIDAZOLE 500 MG: 500 INJECTION, SOLUTION INTRAVENOUS at 23:01

## 2023-01-21 RX ADMIN — AZITHROMYCIN MONOHYDRATE 500 MG: 500 INJECTION, POWDER, LYOPHILIZED, FOR SOLUTION INTRAVENOUS at 11:59

## 2023-01-21 RX ADMIN — MICONAZOLE NITRATE: 2 POWDER TOPICAL at 22:00

## 2023-01-21 NOTE — PROGRESS NOTES
[facility] OF THE [facility] [place] Note      Patient: Nolan Franco  Unit/Bed: Q586/J633-21  YOB: 1932  MRN: [id number]  Admit Date:  1/19/2023  HOSPITAL day #     Rounding Cardiologist : Mary Molina is a 80 y.o.  male patient who is being at the request of Dr. Sam Arcos for inpatient consultation of CHF. He was admitted on 1/19/2023. Previous [facility] and [address] [facility] records have been reviewed in detail. 80 yr old male with a PMH of dyslipidemia, carotid stenosis with left carotid endarterectomy, aortic valve stenosis, mitral valve insufficiency, claudication, dementia hat presented to [address] [facility] ER 1/19/2023 after falling out of bed. Stating that he did not hit his head or lose consciousness. Per ER notes his daughter stated  that he has been having cough, fever, and fatigue for approximately 1 week. Chest x-ray showed 5.5x 4.2 centimeters masslike opacity seen within the right peripheral lower region likely representing pneumonia. CT of the chest showed a dense rounded predominantly right upper lobe consolidation, most consistent with bronchial pneumonia. Interstitial infiltrates on low lung volumes which may be edema and/or interstitial lung disease. Moderate to large size hiatal hernia. Abnormal labs WBC 16.4, Hgb/HCT 12.7/37.7. Subjective Complaint:   . Quite demented, not oriented to place, appears comfortable, sitting upright in bed.   Not in any respiratory distress    Physical Examination:     BP (!) 142/67   Pulse 91   Temp 98 °F (36.7 °C) (Oral)   Resp 18   Ht 6' (1.829 m)   Wt 203 lb 1.6 oz (92.1 kg)   SpO2 93%   BMI 27.55 kg/m²     No intake or output data in the 24 hours ending 01/21/23 1810  Weights  Wt Readings from Last 3 Encounters:   01/19/23 203 lb 1.6 oz (92.1 kg)   09/02/22 220 lb (99.8 kg)     Breath sounds are diminished at the bases bilaterally there is grade 2/6 crescendo decrescendo murmur along the left sternal border there is evidence of venous stasis and 2+ edema bilaterally patient is not oriented to place or person he has a diagnosis of dementia    Telemetry:      normal sinus          LABS:   CBC:   Recent Labs     01/19/23  1045 01/20/23  0553 01/21/23  0519   WBC 16.4* 12.5* 11.9*   HGB 12.7* 11.5* 11.6*    181 224      BMP:    Recent Labs     01/19/23  1045 01/20/23  0806 01/21/23  0519    138 139   K 4.4 4.2 3.9    105 102   CO2 28 22 25   BUN 16 16 21   CREATININE 0.91 0.73 0.96   GLUCOSE 157* 95 122*              Troponin: No results for input(s): TROPONINT in the last 72 hours. BNP:   Recent Labs     01/20/23  1204   PROBNP 528      INR: No results for input(s): INR in the last 72 hours. Mg: No results for input(s): MG in the last 72 hours. Cardiac Testing:   Echocardiogram report was reviewed    Assessment:  Airspace consolidation-being treated for pneumonia no fever, does not appear toxic  Chronic dementia  Systolic murmur of aortic stenosis  Chronic diastolic heart failure  Multi valvular heart disease with preserved LV systolic function and moderate concentric left ventricular hypertrophy  Echo this admission shows 1+ mitral regurgitation diastolic dysfunction moderate concentric left ventricular hypertrophy aortic valve area of 1.5 cm² with peak and mean gradient of 37 and 23 respectively, I suspect that the valve area is closer to 1 cm² just based on the nature of the murmur and the gradients, RVSP 38 mmHg normal RV systolic function was reported on the echo  History of right carotid endarterectomy  Large hiatal hernia noted on CAT scan and chest x-ray.   May be a contributory factor to his pneumonia        Plan:  Supportive care, continued diuretics, check orthostatics if possible, fall precautions, address CODE STATUS, primary goal patient comfort  Electronically signed by Dennis Nolasco MD on 1/21/2023 at 6:10 PM

## 2023-01-21 NOTE — PROGRESS NOTES
Patient assessment completed. Patient alert and oriented with periods of confusion noted. Patient declines any pain or discomfort at this time. Patient continued on IV ATB with no adverse reactions noted. Patient comfortable in bed, safety precautions in place and call light within reach, will continue to monitor.

## 2023-01-22 LAB
ANION GAP SERPL CALCULATED.3IONS-SCNC: 14 MEQ/L (ref 9–15)
BASOPHILS ABSOLUTE: 0.1 K/UL (ref 0–0.2)
BASOPHILS RELATIVE PERCENT: 0.8 %
BUN BLDV-MCNC: 18 MG/DL (ref 8–23)
CALCIUM SERPL-MCNC: 8.4 MG/DL (ref 8.5–9.9)
CHLORIDE BLD-SCNC: 101 MEQ/L (ref 95–107)
CO2: 23 MEQ/L (ref 20–31)
CREAT SERPL-MCNC: 0.79 MG/DL (ref 0.7–1.2)
EOSINOPHILS ABSOLUTE: 0.4 K/UL (ref 0–0.7)
EOSINOPHILS RELATIVE PERCENT: 4.2 %
GFR SERPL CREATININE-BSD FRML MDRD: >60 ML/MIN/{1.73_M2}
GLUCOSE BLD-MCNC: 117 MG/DL (ref 70–99)
HCT VFR BLD CALC: 35.2 % (ref 42–52)
HEMOGLOBIN: 12.1 G/DL (ref 14–18)
LYMPHOCYTES ABSOLUTE: 1.2 K/UL (ref 1–4.8)
LYMPHOCYTES RELATIVE PERCENT: 13.1 %
MCH RBC QN AUTO: 31.1 PG (ref 27–31.3)
MCHC RBC AUTO-ENTMCNC: 34.3 % (ref 33–37)
MCV RBC AUTO: 90.8 FL (ref 79–92.2)
MONOCYTES ABSOLUTE: 1 K/UL (ref 0.2–0.8)
MONOCYTES RELATIVE PERCENT: 10.6 %
NEUTROPHILS ABSOLUTE: 6.7 K/UL (ref 1.4–6.5)
NEUTROPHILS RELATIVE PERCENT: 71.3 %
PDW BLD-RTO: 14.1 % (ref 11.5–14.5)
PLATELET # BLD: 243 K/UL (ref 130–400)
POTASSIUM REFLEX MAGNESIUM: 4.4 MEQ/L (ref 3.4–4.9)
PROCALCITONIN: 0.08 NG/ML (ref 0–0.15)
RBC # BLD: 3.88 M/UL (ref 4.7–6.1)
SODIUM BLD-SCNC: 138 MEQ/L (ref 135–144)
STREP PNEUMO AG, UR: NEGATIVE
WBC # BLD: 9.4 K/UL (ref 4.8–10.8)

## 2023-01-22 PROCEDURE — 85025 COMPLETE CBC W/AUTO DIFF WBC: CPT

## 2023-01-22 PROCEDURE — 2580000003 HC RX 258: Performed by: INTERNAL MEDICINE

## 2023-01-22 PROCEDURE — 2500000003 HC RX 250 WO HCPCS: Performed by: INTERNAL MEDICINE

## 2023-01-22 PROCEDURE — 36415 COLL VENOUS BLD VENIPUNCTURE: CPT

## 2023-01-22 PROCEDURE — 6370000000 HC RX 637 (ALT 250 FOR IP): Performed by: INTERNAL MEDICINE

## 2023-01-22 PROCEDURE — 80048 BASIC METABOLIC PNL TOTAL CA: CPT

## 2023-01-22 PROCEDURE — 1210000000 HC MED SURG R&B

## 2023-01-22 PROCEDURE — 6360000002 HC RX W HCPCS: Performed by: INTERNAL MEDICINE

## 2023-01-22 PROCEDURE — 84145 PROCALCITONIN (PCT): CPT

## 2023-01-22 RX ADMIN — ASPIRIN 81 MG: 81 TABLET, CHEWABLE ORAL at 08:44

## 2023-01-22 RX ADMIN — METRONIDAZOLE 500 MG: 500 INJECTION, SOLUTION INTRAVENOUS at 06:32

## 2023-01-22 RX ADMIN — AZITHROMYCIN MONOHYDRATE 500 MG: 500 INJECTION, POWDER, LYOPHILIZED, FOR SOLUTION INTRAVENOUS at 08:50

## 2023-01-22 RX ADMIN — GUAIFENESIN 600 MG: 600 TABLET ORAL at 08:44

## 2023-01-22 RX ADMIN — MICONAZOLE NITRATE: 2 POWDER TOPICAL at 20:46

## 2023-01-22 RX ADMIN — CEFTRIAXONE SODIUM 1000 MG: 1 INJECTION, POWDER, FOR SOLUTION INTRAMUSCULAR; INTRAVENOUS at 15:33

## 2023-01-22 RX ADMIN — ISOSORBIDE MONONITRATE 30 MG: 60 TABLET, EXTENDED RELEASE ORAL at 08:44

## 2023-01-22 RX ADMIN — MICONAZOLE NITRATE: 2 POWDER TOPICAL at 08:49

## 2023-01-22 RX ADMIN — GUAIFENESIN 600 MG: 600 TABLET ORAL at 20:46

## 2023-01-22 RX ADMIN — METRONIDAZOLE 500 MG: 500 INJECTION, SOLUTION INTRAVENOUS at 17:43

## 2023-01-22 RX ADMIN — ENOXAPARIN SODIUM 40 MG: 100 INJECTION SUBCUTANEOUS at 08:45

## 2023-01-22 ASSESSMENT — PAIN SCALES - GENERAL
PAINLEVEL_OUTOF10: 0
PAINLEVEL_OUTOF10: 0

## 2023-01-22 NOTE — CARE COORDINATION
LSW meet with pt at bedside. Discuss DC plan with pt. Pt agree with DC plan home with wife.   Denies any needs  LSW will follow.     Electronically signed by DALILA Beck on 1/22/2023 at 9:50 AM

## 2023-01-22 NOTE — PROGRESS NOTES
Assessment completed this shift. Awake and alert. Consistently oriented to person. At time of am assessment oriented to situation and date. Later in afternoon, patient was more confused and impulsive. Repeatedly getting out of chair without calling. Chair alarm went off multiple times. Patient stated, \" I have to go see my wife\". AVASYS obtained for safety in addition to chair/bed alarm in use. Lungs diminished. Significant heart murmur heard. BLE red and +2 pitting edema noted. In bed at present with call light in reach.   Electronically signed by Fermín Booker RN on 1/22/2023 at 6:19 PM

## 2023-01-22 NOTE — PROGRESS NOTES
[facility] Progress Note      Patient: Demar Valle  Unit/Bed: N688/S147-80  YOB: 1932  MRN: [id number]  Admit Date:  1/19/2023  HOSPITAL day #     Rounding Cardiologist : Arelia Bloch    Primary Cards this hospital stay : Susan Saavedra is a 80 y.o.  male patient who is being at the request of Dr. Kalani Schilling for inpatient consultation of CHF. He was admitted on 1/19/2023. Previous [address] [place] [address] and [facility] records have been reviewed in detail. 80 yr old male with a PMH of dyslipidemia, carotid stenosis with left carotid endarterectomy, aortic valve stenosis, mitral valve insufficiency, claudication, dementia hat presented to [facility] ER 1/19/2023 after falling out of bed. Stating that he did not hit his head or lose consciousness. Per ER notes his daughter stated  that he has been having cough, fever, and fatigue for approximately 1 week. Chest x-ray showed 5.5x 4.2 centimeters masslike opacity seen within the right peripheral lower region likely representing pneumonia. CT of the chest showed a dense rounded predominantly right upper lobe consolidation, most consistent with bronchial pneumonia. Interstitial infiltrates on low lung volumes which may be edema and/or interstitial lung disease. Moderate to large size hiatal hernia. Abnormal labs WBC 16.4, Hgb/HCT 12.7/37.7. Subjective Complaint:   . Quite demented, not oriented to place, appears comfortable, sitting upright in bed.   Not in any respiratory distress    Physical Examination:     BP (!) 140/71   Pulse (!) 103   Temp 98.6 °F (37 °C) (Oral)   Resp 16   Ht 6' (1.829 m)   Wt 196 lb 4.8 oz (89 kg)   SpO2 91%   BMI 26.62 kg/m²       Intake/Output Summary (Last 24 hours) at 1/22/2023 1233  Last data filed at 1/22/2023 0636  Gross per 24 hour   Intake 50 ml   Output --   Net 50 ml     Weights  Wt Readings from Last 3 Encounters:   01/22/23 196 lb 4.8 oz (89 kg)   09/02/22 220 lb (99.8 kg)     Breath sounds are diminished at the bases bilaterally there is grade 2/6 crescendo decrescendo  high pitched murmur along the left sternal border there is evidence of venous stasis and 1/ 2+ edema bilaterally ,pitting/non pitting patient is not oriented to place or person he has a diagnosis of dementia    Telemetry:      normal sinus         Echocardiogram Summary   Left ventricular ejection fraction is visually estimated at 60-65%. Moderate concentric left ventricular hypertrophy. Impaired relaxation compatible with diastolic dysfunction. ( reversed E/A   ratio)   Abnormal diastolic dysfunction by tissue doppler index. Mild (1+) mitral regurgitation is present. Mild-to-moderate mitral stenosis. The mitral valve area estimated by pressure half-time method is 2.4 . Calcification of the mitral valve noted--severe with perhaps Mitral   Valvular ring ? ? vs senile calcification   Severely thickened trileaflet aortic valve with decresed excursion. The aortic valve area is 1.5 cm2 with a maximum gradient of 37 mmHg and a   mean gradient of 23 mmHg. --moderate aortic valve stenosis   There is mild ( 1 +) tricuspid regurgitation with estimated RVSP of 38 mm   Hg. Mildly dilated left atrium. The right ventricle was not clearly visualized . Normal right ventricular size with preserved RV function        LABS:   CBC:   Recent Labs     01/20/23  0553 01/21/23  0519 01/22/23  0515   WBC 12.5* 11.9* 9.4   HGB 11.5* 11.6* 12.1*    224 243        BMP:    Recent Labs     01/20/23  0806 01/21/23  0519 01/22/23  0515    139 138   K 4.2 3.9 4.4    102 101   CO2 22 25 23   BUN 16 21 18   CREATININE 0.73 0.96 0.79   GLUCOSE 95 122* 117*                Troponin: No results for input(s): TROPONINT in the last 72 hours. BNP:   Recent Labs     01/20/23  1204   PROBNP 528        INR: No results for input(s): INR in the last 72 hours. Mg: No results for input(s): MG in the last 72 hours.     Cardiac Testing:   Echocardiogram report was reviewed, surprisingly aortic stenosis is now severe, physical exam would suggest severe aortic stenosis. Assessment:    Airspace consolidation-being treated for pneumonia no fever, does not appear toxic  Chronic dementia  Systolic murmur of aortic stenosis  Chronic diastolic heart failure  Multi valvular heart disease with preserved LV systolic function and moderate concentric left ventricular hypertrophy  Echo this admission shows 1+ mitral regurgitation diastolic dysfunction moderate concentric left ventricular hypertrophy aortic valve area of 1.5 cm² with peak and mean gradient of 37 and 23 respectively, I suspect that the valve area is closer to 1 cm² just based on the nature of the murmur and the gradients, RVSP 38 mmHg normal RV systolic function was reported on the echo  History of right carotid endarterectomy. Based on his examination I would expect his aortic stenosis to be worse  Large hiatal hernia noted on CAT scan and chest x-ray. May be a contributory factor to his pneumonia  Patient actually looks quite comfortable, but his chest CT is impressive. Patient had a barium swallow today, results are pending  I do not see that a carotid ultrasound has been done  His lower extremity edema is chronic, and would not aggressively diurese. Plan:  Supportive care, continued diuretics, check orthostatics if possible, fall precautions, address CODE STATUS, primary goal patient comfort  Discharge planning per primary service.   Cardiology will continue to follow while he is in-house  Electronically signed by Lia Woodson MD on 1/22/2023 at 12:33 PM

## 2023-01-22 NOTE — FLOWSHEET NOTE
Pt. Has been sounding off the chair alarm by walking around the room. Pt is forgetful about his location. Pt can be redirected however, pt will get up again w/o using the call light. Pt's feet are edematous with +3 pitting.

## 2023-01-22 NOTE — PROGRESS NOTES
Hospitalist Daily Progress Note  Name: Annmarie Sun  Age: 80 y.o. Gender: male  CodeStatus: Full Code  Allergies: No Known Allergies    Chief Complaint:Fall    Primary Care Provider: Emilia Rosales DO  InpatientTreatment Team: Treatment Team: Attending Provider: Shonna Farris DO; Consulting Physician: Israel Murphy MD; Registered Nurse: Acacia Zuñiga RN; Utilization Reviewer: Jaqui Kulkarni RN; Registered Nurse: Vic Scherer RN  Admission Date: 1/19/2023      Subjective: patient seen and evaluated. pt remains pleasantly confused. Afebrile. Vitals stable large hiatal hernia noted on CT imaging. Minced and moist diet with thickened liquids recommended by ST. Procal remains negative. ECHO EF 65 with significant valve disease. Family updated    Physical Exam  Constitutional: alert, appears stated age and cooperative  Skin: Skin color, texture, turgor normal. No rashes or lesions  Eyes:Eye: Normal external eye, conjunctiva, FIOR. ENT: Head: Normocephalic, no lesions, without obvious abnormality. Neck: no adenopathy, no carotid bruit, no JVD, supple, symmetrical, trachea midline and thyroid not enlarged, symmetric, no tenderness/mass/nodules  Respiratory: 3 out of 6 systolic ejection murmur with a holosystolic murmur  Cardiovascular: Rales bilaterally worse on the right with occasional rhonchi  Gastrointestinal: soft, non-tender; bowel sounds normal; no masses,  no organomegaly  Genitourinary: Deferred  Musculoskeletal:extremities 3+ pitting edema bilateral lower extremities  Neurologic: Mental status AAOx person and place but not to time or situation no facial asymmetry or droop. Normal muscle strength b/l. Psychiatric: Appropriate mood and affect. Poor insight and judgement  Hematologic: No obvious bruising or bleeding    Review of Systems  14 point ros is reviewed and negative except for above.    Medications:  Reviewed    Infusion Medications:   Scheduled Medications:    aspirin  81 mg Oral Daily    isosorbide mononitrate  30 mg Oral Daily    enoxaparin  40 mg SubCUTAneous Daily    cefTRIAXone (ROCEPHIN) IV  1,000 mg IntraVENous Q24H    And    azithromycin  500 mg IntraVENous Q24H    guaiFENesin  600 mg Oral BID    miconazole   Topical BID    metroNIDAZOLE  500 mg IntraVENous Q8H     PRN Meds: ondansetron **OR** ondansetron, acetaminophen **OR** acetaminophen, ipratropium-albuterol    Labs:   Recent Labs     01/19/23  1045 01/20/23  0553 01/21/23  0519   WBC 16.4* 12.5* 11.9*   HGB 12.7* 11.5* 11.6*   HCT 37.7* 34.1* 34.8*    181 224       Recent Labs     01/19/23  1045 01/20/23  0806 01/21/23  0519    138 139   K 4.4 4.2 3.9    105 102   CO2 28 22 25   BUN 16 16 21   CREATININE 0.91 0.73 0.96   CALCIUM 8.4* 7.8* 8.0*       Recent Labs     01/19/23  1045   AST 16   ALT 7   BILITOT 1.4*   ALKPHOS 86       No results for input(s): INR in the last 72 hours. Recent Labs     01/20/23  0008 01/20/23  0553   TROPONINI <0.010 <0.010         Urinalysis:   No results found for: Dorethia Grier, BACTERIA, RBCUA, BLOODU, [address], [place] [address]    Radiology:   Most recent    Chest CT      WITH CONTRAST:Results for orders placed during the hospital encounter of 01/19/23    CT CHEST W CONTRAST    Narrative  EXAMINATION:  CT OF THE CHEST WITH CONTRAST 1/19/2023 1:24 pm    TECHNIQUE:  CT of the chest was performed with the administration of intravenous  contrast. Multiplanar reformatted images are provided for review. Automated  exposure control, iterative reconstruction, and/or weight based adjustment of  the mA/kV was utilized to reduce the radiation dose to as low as reasonably  achievable. COMPARISON:  None.     HISTORY:  ORDERING SYSTEM PROVIDED HISTORY: cough  TECHNOLOGIST PROVIDED HISTORY:  Reason for exam:->cough  Decision Support Exception - unselect if not a suspected or confirmed  emergency medical condition->Emergency Medical Condition (MA)  What reading provider will be dictating this exam?->CRC    FINDINGS:  Lungs/pleura: Up to approximately 10 cm somewhat rounded dense consolidation  with central air bronchograms, predominantly within the anterior/inferior  aspect of the right upper lobe most consistent with bronchopneumonia. No  definite underlying mass, organized fluid collection, or significant pleural  effusion. Shallow inspiratory volumes with mild to moderate interstitial  infiltrates, worsening inferiorly. No other significant nodules, within the  limits of the study. Mediastinum: Mild lower mediastinal lymphadenopathy, likely reactive. The  thoracic aorta is moderately a unfolded and ectatic with mild atherosclerotic  plaquing. The heart is borderline enlarged with extensive mitral valve,  aortic valve and coronary artery calcifications and/or stents. No  significant pericardial effusion. Coarsely calcified left hilar lymph nodes. Moderate to large-sized hiatal hernia, without acute complication. Soft Tissues/Bones: Mild-to-moderate degenerative changes and mild chronic  compression deformities of the T1, T2 and T9 vertebral bodies. No worrisome  bone destruction or acute fractures identified. Mild bilateral gynecomastia. Upper Abdomen: Noncontributory. Impression  Dense rounded predominantly right upper lob consolidation, most consistent  with bronchopneumonia. Interstitial infiltrates on low lung volumes, which may be edema and or  interstitial lung disease such as UIP. Mild probably reactive lower mediastinal lymphadenopathy. Moderate to large-sized hiatal hernia and other chronic findings, as noted. RECOMMENDATIONS:  Follow-up imaging to document resolution. WITHOUT CONTRAST: No results found for this or any previous visit.       CXR      2-view: Results for orders placed during the hospital encounter of 01/19/23    XR CHEST (2 VW)    Narrative  EXAMINATION:  TWO XRAY VIEWS OF THE CHEST    1/19/2023 11:36 am    COMPARISON:  None. HISTORY:  ORDERING SYSTEM PROVIDED HISTORY: cough  TECHNOLOGIST PROVIDED HISTORY:  Reason for exam:->cough  What reading provider will be dictating this exam?->CRC    FINDINGS:  Two views the chest were obtained. The heart is enlarged. There is a masslike opacity seen within the left perihilar region measuring  5.5 x 4.2 cm. No right pleural effusion is noted. The left lung is clear. Note is made of a hiatal hernia. Impression  5.5 x 4.2 cm masslike opacity seen within the right perihilar region likely  representing pneumonia. Given that there is a masslike appearance dedicated  CT of the chest is recommended to exclude underlying pathology. Portable: Results for orders placed during the hospital encounter of 09/02/22    XR CHEST PORTABLE    Narrative  EXAMINATION:  PORTABLE CHEST RADIOGRAPH. CLINICAL HISTORY:  TRAUMA. COMPARISONS:  NONE AVAILABLE    TECHNIQUE:  Frontal view chest.    FINDINGS:  Heart is enlarged. Rounded density projected over the lower thoracic midline could represent either tortuous aorta or large esophageal hiatal hernia. Nodular calcifications overlying the left hilum probably representing chronic granulomatous  jael residual.    Horizontal linear density near the lateral left lung base probably represents linear scar or atelectasis. There may be mild atelectasis at either lung base. Remaining lung fields are otherwise clear. There is no significant pleural fluid or pneumothorax. Osseous structures are grossly intact. Impression  1. CARDIOMEGALY. 2. MILD BASILAR ATELECTASIS. 3. POSSIBLE LARGE ESOPHAGEAL HIATAL HERNIA. 4. NO OTHER SIGNIFICANT ACUTE INTRATHORACIC ABNORMALITY. Echo No results found for this or any previous visit.             Assessment/Plan:    Active Hospital Problems    Diagnosis Date Noted    Community acquired pneumonia due to Mycoplasma pneumoniae [J15.7] 01/19/2023     Priority: Medium     Acute bacterial pneumonia: aspiration coverage and cap coverage with ceftriaxone, azithromycin, flagyl. Pro calcitonin remains negative making diagnosis of pneumonitis more likely. Sputum culture urine strep urine Legionella RVP negative. Add Mucinex inhaled bronchodilators Acapella incentive spirometry. continue Trend procalcitonin for antibiotic de-escalation     Lower extremity edema/ diastolic heart failure: cardiology following pHTN with diffuse valve disease. Gentle hydration, terry hose, elevate the legs as tolerated. Continue daily weights intake and output monitoring low-sodium fluid restricted diet trend troponins    Hiatal hernia with aspiration:minced moist thick liquids. Dementia: Supportive care  Diffuse valvular disease: As above repeat echocardiogram    Hyperlipidemia with history of carotid stenosis: asa   DVT prophylaxis Lovenox       Additional work up or/and treatment plan may be added today or then after based on clinical progression. I am managing a portion of pt care. Some medical issues are handled byother specialists. Additional work up and treatment should be done in out pt setting by pt PCP and other out pt providers. In addition to examining and evaluating pt, I spent additional time explaining care, normaland abnormal findings, and treatment plan. All of pt questions were answered. Counseling, diet and education were provided. Case will be discussed with nursing staff when appropriate. Family will be updated if and whenappropriate.       Electronically signed by Fiona Clark DO on 1/21/2023 at 7:08 PM

## 2023-01-23 ENCOUNTER — APPOINTMENT (OUTPATIENT)
Dept: ULTRASOUND IMAGING | Age: 88
DRG: 177 | End: 2023-01-23
Payer: MEDICARE

## 2023-01-23 LAB
ANION GAP SERPL CALCULATED.3IONS-SCNC: 12 MEQ/L (ref 9–15)
BASOPHILS ABSOLUTE: 0 K/UL (ref 0–0.2)
BASOPHILS RELATIVE PERCENT: 0.6 %
BUN BLDV-MCNC: 13 MG/DL (ref 8–23)
CALCIUM SERPL-MCNC: 8.4 MG/DL (ref 8.5–9.9)
CHLORIDE BLD-SCNC: 102 MEQ/L (ref 95–107)
CO2: 23 MEQ/L (ref 20–31)
CREAT SERPL-MCNC: 0.81 MG/DL (ref 0.7–1.2)
EKG ATRIAL RATE: 93 BPM
EKG P AXIS: 8 DEGREES
EKG P-R INTERVAL: 172 MS
EKG Q-T INTERVAL: 362 MS
EKG QRS DURATION: 80 MS
EKG QTC CALCULATION (BAZETT): 450 MS
EKG R AXIS: -2 DEGREES
EKG T AXIS: 27 DEGREES
EKG VENTRICULAR RATE: 93 BPM
EOSINOPHILS ABSOLUTE: 0.5 K/UL (ref 0–0.7)
EOSINOPHILS RELATIVE PERCENT: 6 %
GFR SERPL CREATININE-BSD FRML MDRD: >60 ML/MIN/{1.73_M2}
GLUCOSE BLD-MCNC: 105 MG/DL (ref 70–99)
HCT VFR BLD CALC: 35.6 % (ref 42–52)
HEMOGLOBIN: 12.2 G/DL (ref 14–18)
LYMPHOCYTES ABSOLUTE: 1.6 K/UL (ref 1–4.8)
LYMPHOCYTES RELATIVE PERCENT: 19 %
MCH RBC QN AUTO: 31 PG (ref 27–31.3)
MCHC RBC AUTO-ENTMCNC: 34.2 % (ref 33–37)
MCV RBC AUTO: 90.6 FL (ref 79–92.2)
MONOCYTES ABSOLUTE: 1 K/UL (ref 0.2–0.8)
MONOCYTES RELATIVE PERCENT: 12.3 %
NEUTROPHILS ABSOLUTE: 5.1 K/UL (ref 1.4–6.5)
NEUTROPHILS RELATIVE PERCENT: 62.1 %
PDW BLD-RTO: 14.2 % (ref 11.5–14.5)
PLATELET # BLD: 254 K/UL (ref 130–400)
POTASSIUM REFLEX MAGNESIUM: 4 MEQ/L (ref 3.4–4.9)
PROCALCITONIN: 0.08 NG/ML (ref 0–0.15)
RBC # BLD: 3.93 M/UL (ref 4.7–6.1)
SODIUM BLD-SCNC: 137 MEQ/L (ref 135–144)
WBC # BLD: 8.2 K/UL (ref 4.8–10.8)

## 2023-01-23 PROCEDURE — 85025 COMPLETE CBC W/AUTO DIFF WBC: CPT

## 2023-01-23 PROCEDURE — 6370000000 HC RX 637 (ALT 250 FOR IP): Performed by: INTERNAL MEDICINE

## 2023-01-23 PROCEDURE — 6370000000 HC RX 637 (ALT 250 FOR IP): Performed by: NURSE PRACTITIONER

## 2023-01-23 PROCEDURE — 1210000000 HC MED SURG R&B

## 2023-01-23 PROCEDURE — 36415 COLL VENOUS BLD VENIPUNCTURE: CPT

## 2023-01-23 PROCEDURE — 80048 BASIC METABOLIC PNL TOTAL CA: CPT

## 2023-01-23 PROCEDURE — 84145 PROCALCITONIN (PCT): CPT

## 2023-01-23 PROCEDURE — 2580000003 HC RX 258: Performed by: INTERNAL MEDICINE

## 2023-01-23 PROCEDURE — 93880 EXTRACRANIAL BILAT STUDY: CPT

## 2023-01-23 PROCEDURE — 6360000002 HC RX W HCPCS: Performed by: INTERNAL MEDICINE

## 2023-01-23 PROCEDURE — 97162 PT EVAL MOD COMPLEX 30 MIN: CPT

## 2023-01-23 RX ORDER — ASPIRIN 81 MG/1
81 TABLET, CHEWABLE ORAL DAILY
Qty: 30 TABLET | Refills: 3 | Status: SHIPPED | OUTPATIENT
Start: 2023-01-24

## 2023-01-23 RX ORDER — IPRATROPIUM BROMIDE AND ALBUTEROL SULFATE 2.5; .5 MG/3ML; MG/3ML
3 SOLUTION RESPIRATORY (INHALATION) EVERY 4 HOURS PRN
Qty: 360 ML | Refills: 1 | Status: SHIPPED | OUTPATIENT
Start: 2023-01-23

## 2023-01-23 RX ORDER — TORSEMIDE 20 MG/1
20 TABLET ORAL DAILY
Qty: 30 TABLET | Refills: 3 | Status: SHIPPED | OUTPATIENT
Start: 2023-01-23

## 2023-01-23 RX ORDER — ISOSORBIDE MONONITRATE 30 MG/1
30 TABLET, EXTENDED RELEASE ORAL DAILY
Qty: 30 TABLET | Refills: 3 | Status: SHIPPED | OUTPATIENT
Start: 2023-01-24

## 2023-01-23 RX ORDER — TORSEMIDE 20 MG/1
20 TABLET ORAL DAILY
Status: DISCONTINUED | OUTPATIENT
Start: 2023-01-23 | End: 2023-01-24 | Stop reason: HOSPADM

## 2023-01-23 RX ORDER — CEFUROXIME AXETIL 500 MG/1
500 TABLET ORAL 2 TIMES DAILY
Qty: 10 TABLET | Refills: 0 | Status: SHIPPED | OUTPATIENT
Start: 2023-01-23 | End: 2023-01-28

## 2023-01-23 RX ORDER — METOPROLOL SUCCINATE 25 MG/1
25 TABLET, EXTENDED RELEASE ORAL DAILY
Qty: 30 TABLET | Refills: 3 | Status: SHIPPED | OUTPATIENT
Start: 2023-01-23

## 2023-01-23 RX ORDER — GUAIFENESIN 600 MG/1
600 TABLET, EXTENDED RELEASE ORAL 2 TIMES DAILY
Qty: 28 TABLET | Refills: 0 | Status: SHIPPED | OUTPATIENT
Start: 2023-01-23 | End: 2023-02-06

## 2023-01-23 RX ORDER — METOPROLOL SUCCINATE 25 MG/1
25 TABLET, EXTENDED RELEASE ORAL DAILY
Status: DISCONTINUED | OUTPATIENT
Start: 2023-01-23 | End: 2023-01-24 | Stop reason: HOSPADM

## 2023-01-23 RX ADMIN — MICONAZOLE NITRATE: 2 POWDER TOPICAL at 09:34

## 2023-01-23 RX ADMIN — METOPROLOL SUCCINATE 25 MG: 25 TABLET, EXTENDED RELEASE ORAL at 16:53

## 2023-01-23 RX ADMIN — ENOXAPARIN SODIUM 40 MG: 100 INJECTION SUBCUTANEOUS at 09:33

## 2023-01-23 RX ADMIN — MICONAZOLE NITRATE: 2 POWDER TOPICAL at 20:57

## 2023-01-23 RX ADMIN — ASPIRIN 81 MG: 81 TABLET, CHEWABLE ORAL at 09:31

## 2023-01-23 RX ADMIN — GUAIFENESIN 600 MG: 600 TABLET ORAL at 20:57

## 2023-01-23 RX ADMIN — TORSEMIDE 20 MG: 20 TABLET ORAL at 16:53

## 2023-01-23 RX ADMIN — GUAIFENESIN 600 MG: 600 TABLET ORAL at 09:31

## 2023-01-23 RX ADMIN — ISOSORBIDE MONONITRATE 30 MG: 60 TABLET, EXTENDED RELEASE ORAL at 09:30

## 2023-01-23 RX ADMIN — CEFTRIAXONE SODIUM 1000 MG: 1 INJECTION, POWDER, FOR SOLUTION INTRAMUSCULAR; INTRAVENOUS at 14:41

## 2023-01-23 NOTE — DISCHARGE INSTR - COC
Continuity of Care Form    Patient Name: Shruthi Mccormick   :  1932  MRN:  [id number]    Admit date:  2023  Discharge date:  2023    Code Status Order: Full Code   Advance Directives:     Admitting Physician:  Rachelle Jason DO  PCP: Mikael Brown DO    Discharging Nurse: Andrea Osborne RN  [address] Unit/Room#: N836/Z162-22  Discharging Unit Phone Number: [phone]    Emergency Contact:   Extended Emergency Contact Information  [facility]: [phone]  Relation: Child  Preferred language: English   needed? No  Secondary Emergency Contact: [facility] Phone: [phone]  Relation: Spouse   needed? No    Past Surgical History:  History reviewed. No pertinent surgical history.     Immunization History:   Immunization History   Administered Date(s) Administered    COVID-19, PFIZER Bivalent BOOSTER, DO NOT Dilute, (age 12y+), IM, 30 mcg/0.3 mL 10/05/2022    COVID-19, PFIZER GRAY top, DO NOT Dilute, (age 15 y+), IM, 30 mcg/0.3 mL 2022    COVID-19, PFIZER PURPLE top, DILUTE for use, (age 15 y+), 30mcg/0.3mL 2021, 2021       Active Problems:  Patient Active Problem List   Diagnosis Code    Community acquired pneumonia due to Mycoplasma pneumoniae J15.7       Isolation/Infection:   Isolation            No Isolation          Patient Infection Status       Infection Onset Added Last Indicated Last Indicated By Review Planned Expiration Resolved Resolved By    None active    Resolved    COVID-19 (Rule Out) 23 Respiratory Panel, Molecular, with COVID-19 (Restricted: peds pts or suitable admitted adults) (Ordered)   23 Rule-Out Test Resulted    COVID-19 (Rule Out) 23 Respiratory Panel, Molecular, with COVID-19 (Restricted: peds pts or suitable admitted adults) (Ordered)   23 Rule-Out Test Canceled            Nurse Assessment:  Last Vital Signs: /77   Pulse 87   Temp 97.1 °F (36.2 °C) (Oral)   Resp 16   Ht 6' (1.829 m)   Wt 195 lb 8 oz (88.7 kg)   SpO2 95%   BMI 26.51 kg/m²     Last documented pain score (0-10 scale): Pain Level: 0  Last Weight:   Wt Readings from Last 1 Encounters:   01/23/23 195 lb 8 oz (88.7 kg)     Mental Status:  oriented and alert to person and time, disoriented to place and situation    IV Access:  - None    Nursing Mobility/ADLs:  Walking   Assisted  Transfer  Assisted  Bathing  Assisted  Dressing  Assisted  Toileting  Assisted  Feeding  Assisted  Med Admin  Assisted  Med Delivery   crushed    Wound Care Documentation and Therapy:        Elimination:  Continence: Bowel: No  Bladder: No  Urinary Catheter: None   Colostomy/Ileostomy/Ileal Conduit: No       Date of Last BM: 01/22/2023    Intake/Output Summary (Last 24 hours) at 1/23/2023 1120  Last data filed at 1/22/2023 1820  Gross per 24 hour   Intake 883.21 ml   Output --   Net 883.21 ml     I/O last 3 completed shifts: In: 933.2 [P.O.:50; IV Piggyback:883.2]  Out: -     Safety Concerns: At Risk for Falls and Aspiration Risk    Impairments/Disabilities:      None    Nutrition Therapy:  Current Nutrition Therapy:   - Oral Diet:  Dysphagia 2 mechanically altered (soft and bite sized)    Routes of Feeding: Oral  Liquids: Nectar Thick Liquids  Daily Fluid Restriction: no  Last Modified Barium Swallow with Video (Video Swallowing Test): not done    Treatments at the Time of Hospital Discharge:   Respiratory Treatments: Duoneb every 4 hours as needed  Oxygen Therapy:  is not on home oxygen therapy.   Ventilator:    - No ventilator support    Rehab Therapies: Physical Therapy, Occupational Therapy, and Speech/Language Therapy  Weight Bearing Status/Restrictions: No weight bearing restrictions  Other Medical Equipment (for information only, NOT a DME order):  walker  Other Treatments: none      Patient's personal belongings (please select all that are sent with patient):       RN SIGNATURE:  Electronically signed by Pavel Andrade RN on 1/24/23 at 9:27 AM EST    CASE MANAGEMENT/SOCIAL WORK SECTION    Inpatient Status Date: 1/19/2023    Readmission Risk Assessment Score:  Readmission Risk              Risk of Unplanned Readmission:  12           Discharging to Facility/ Agency   Name: [facility]  Address:  Phone:  Fax:    Dialysis Facility (if applicable)   Name:  Address:  Dialysis Schedule:  Phone:  Fax:    / signature: Electronically signed by DALILA Ramos on 1/23/23 at 11:21 AM EST    PHYSICIAN SECTION    Prognosis: Fair    Condition at Discharge: Stable    Rehab Potential (if transferring to Rehab): Fair    Recommended Labs or Other Treatments After Discharge:     Physician Certification: I certify the above information and transfer of Shaylee Higginbotham  is necessary for the continuing treatment of the diagnosis listed and that he requires [place] for less 30 days.      Update Admission H&P: No change in H&P    PHYSICIAN SIGNATURE:  Electronically signed by Sadiq Allen MD on 1/23/23 at 11:56 AM EST

## 2023-01-23 NOTE — PROGRESS NOTES
Hospitalist Daily Progress Note  Name: Cheryle Polka  Age: 80 y.o. Gender: male  CodeStatus: Full Code  Allergies: No Known Allergies    Chief Complaint:Fall    Primary Care Provider: Yokasta Fernandes DO  InpatientTreatment Team: Treatment Team: Attending Provider: Madeleine Rincon DO; Consulting Physician: Cynthia Hill MD; Patient Care Tech: Sotero Dee; Utilization Reviewer: Miguel Angel Aguirre RN  Admission Date: 1/19/2023      Subjective: patient seen and evaluated. pt remains pleasantly confused. Procal remains negative. Family updated at bedside, and have placed a deposit at [facility] assisted living for d/c. Afebrile. Vitals stable tolerating updated diet. Physical Exam  Constitutional: alert, appears stated age and cooperative  Skin: Skin color, texture, turgor normal. No rashes or lesions  Eyes:Eye: Normal external eye, conjunctiva, FIOR. ENT: Head: Normocephalic, no lesions, without obvious abnormality. Neck: no adenopathy, no carotid bruit, no JVD, supple, symmetrical, trachea midline and thyroid not enlarged, symmetric, no tenderness/mass/nodules  Respiratory: 3 out of 6 systolic ejection murmur with a holosystolic murmur  Cardiovascular: Rales bilaterally worse on the right with occasional rhonchi  Gastrointestinal: soft, non-tender; bowel sounds normal; no masses,  no organomegaly  Genitourinary: Deferred  Musculoskeletal:extremities 3+ pitting edema bilateral lower extremities  Neurologic: Mental status AAOx person and place but not to time or situation no facial asymmetry or droop. Normal muscle strength b/l. Psychiatric: Appropriate mood and affect. Poor insight and judgement  Hematologic: No obvious bruising or bleeding    Review of Systems  14 point ros is reviewed and negative except for above.    Medications:  Reviewed    Infusion Medications:   Scheduled Medications:    aspirin  81 mg Oral Daily    isosorbide mononitrate  30 mg Oral Daily    enoxaparin  40 mg SubCUTAneous Daily    cefTRIAXone (ROCEPHIN) IV  1,000 mg IntraVENous Q24H    guaiFENesin  600 mg Oral BID    miconazole   Topical BID    metroNIDAZOLE  500 mg IntraVENous Q8H     PRN Meds: ondansetron **OR** ondansetron, acetaminophen **OR** acetaminophen, ipratropium-albuterol    Labs:   Recent Labs     01/20/23  0553 01/21/23  0519 01/22/23  0515   WBC 12.5* 11.9* 9.4   HGB 11.5* 11.6* 12.1*   HCT 34.1* 34.8* 35.2*    224 243       Recent Labs     01/20/23  0806 01/21/23  0519 01/22/23  0515    139 138   K 4.2 3.9 4.4    102 101   CO2 22 25 23   BUN 16 21 18   CREATININE 0.73 0.96 0.79   CALCIUM 7.8* 8.0* 8.4*       No results for input(s): AST, ALT, BILIDIR, BILITOT, ALKPHOS in the last 72 hours. No results for input(s): INR in the last 72 hours. Recent Labs     01/20/23  0008 01/20/23  0553   TROPONINI <0.010 <0.010         Urinalysis:   No results found for: John M[place], BACTERIA, RBCUA, BLOODU, [address], [place] [address]    Radiology:   Most recent    Chest CT      WITH CONTRAST:Results for orders placed during the hospital encounter of 01/19/23    CT CHEST W CONTRAST    Narrative  EXAMINATION:  CT OF THE CHEST WITH CONTRAST 1/19/2023 1:24 pm    TECHNIQUE:  CT of the chest was performed with the administration of intravenous  contrast. Multiplanar reformatted images are provided for review. Automated  exposure control, iterative reconstruction, and/or weight based adjustment of  the mA/kV was utilized to reduce the radiation dose to as low as reasonably  achievable. COMPARISON:  None.     HISTORY:  ORDERING SYSTEM PROVIDED HISTORY: cough  TECHNOLOGIST PROVIDED HISTORY:  Reason for exam:->cough  Decision Support Exception - unselect if not a suspected or confirmed  emergency medical condition->Emergency Medical Condition (MA)  What reading provider will be dictating this exam?->CRC    FINDINGS:  Lungs/pleura: Up to approximately 10 cm somewhat rounded dense consolidation  with central air bronchograms, predominantly within the anterior/inferior  aspect of the right upper lobe most consistent with bronchopneumonia.  No  definite underlying mass, organized fluid collection, or significant pleural  effusion. Shallow inspiratory volumes with mild to moderate interstitial  infiltrates, worsening inferiorly.  No other significant nodules, within the  limits of the study.    Mediastinum: Mild lower mediastinal lymphadenopathy, likely reactive.  The  thoracic aorta is moderately a unfolded and ectatic with mild atherosclerotic  plaquing.  The heart is borderline enlarged with extensive mitral valve,  aortic valve and coronary artery calcifications and/or stents.  No  significant pericardial effusion.  Coarsely calcified left hilar lymph nodes.  Moderate to large-sized hiatal hernia, without acute complication.    Soft Tissues/Bones: Mild-to-moderate degenerative changes and mild chronic  compression deformities of the T1, T2 and T9 vertebral bodies.  No worrisome  bone destruction or acute fractures identified.  Mild bilateral gynecomastia.    Upper Abdomen: Noncontributory.    Impression  Dense rounded predominantly right upper lob consolidation, most consistent  with bronchopneumonia.    Interstitial infiltrates on low lung volumes, which may be edema and or  interstitial lung disease such as UIP.    Mild probably reactive lower mediastinal lymphadenopathy.    Moderate to large-sized hiatal hernia and other chronic findings, as noted.    RECOMMENDATIONS:  Follow-up imaging to document resolution.       WITHOUT CONTRAST: No results found for this or any previous visit.      CXR      2-view: Results for orders placed during the hospital encounter of 01/19/23    XR CHEST (2 VW)    Narrative  EXAMINATION:  TWO XRAY VIEWS OF THE CHEST    1/19/2023 11:36 am    COMPARISON:  None.    HISTORY:  ORDERING SYSTEM PROVIDED HISTORY: cough  TECHNOLOGIST PROVIDED HISTORY:  Reason for exam:->cough  What reading provider will be  dictating this exam?->CRC    FINDINGS:  Two views the chest were obtained. The heart is enlarged. There is a masslike opacity seen within the left perihilar region measuring  5.5 x 4.2 cm. No right pleural effusion is noted. The left lung is clear. Note is made of a hiatal hernia. Impression  5.5 x 4.2 cm masslike opacity seen within the right perihilar region likely  representing pneumonia. Given that there is a masslike appearance dedicated  CT of the chest is recommended to exclude underlying pathology. Portable: Results for orders placed during the hospital encounter of 09/02/22    XR CHEST PORTABLE    Narrative  EXAMINATION:  PORTABLE CHEST RADIOGRAPH. CLINICAL HISTORY:  TRAUMA. COMPARISONS:  NONE AVAILABLE    TECHNIQUE:  Frontal view chest.    FINDINGS:  Heart is enlarged. Rounded density projected over the lower thoracic midline could represent either tortuous aorta or large esophageal hiatal hernia. Nodular calcifications overlying the left hilum probably representing chronic granulomatous  jael residual.    Horizontal linear density near the lateral left lung base probably represents linear scar or atelectasis. There may be mild atelectasis at either lung base. Remaining lung fields are otherwise clear. There is no significant pleural fluid or pneumothorax. Osseous structures are grossly intact. Impression  1. CARDIOMEGALY. 2. MILD BASILAR ATELECTASIS. 3. POSSIBLE LARGE ESOPHAGEAL HIATAL HERNIA. 4. NO OTHER SIGNIFICANT ACUTE INTRATHORACIC ABNORMALITY. Echo No results found for this or any previous visit. Assessment/Plan:    Active Hospital Problems    Diagnosis Date Noted    Community acquired pneumonia due to Mycoplasma pneumoniae [J15.7] 01/19/2023     Priority: Medium     Acute bacterial pneumonia: pro-niles negative de-escalate abx. To ceftriaxone. Repeat pro niles in am.  RVP negative. Continue aggressive pulmonary hygiene. Legionella antigen pending. 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 terry hose, elevate the legs as tolerated. Continue daily weights intake and output monitoring low-sodium fluid restricted diet trend troponins    Hiatal hernia with aspiration:minced moist thick liquids. Dementia: Supportive care  Diffuse valvular disease: As above repeat echocardiogram    Hyperlipidemia with history of carotid stenosis: asa   DVT prophylaxis Lovenox       Additional work up or/and treatment plan may be added today or then after based on clinical progression. I am managing a portion of pt care. Some medical issues are handled byother specialists. Additional work up and treatment should be done in out pt setting by pt PCP and other out pt providers. In addition to examining and evaluating pt, I spent additional time explaining care, normaland abnormal findings, and treatment plan. All of pt questions were answered. Counseling, diet and education were provided. Case will be discussed with nursing staff when appropriate. Family will be updated if and whenappropriate.       Electronically signed by Mona Dickson DO on 1/22/2023 at 7:58 PM

## 2023-01-23 NOTE — PROGRESS NOTES
1050: Called [address] for okay to DC from cardiology standpoint. Awaiting return call.     1100: Spoke to Crystal Weinstein with [facility], states patient is not ready for discharge, but may be appropriate later today. Patient still needs carotid ultrasound and PT/OT eval and treat completed. 1130: Overheard PT/OT informing case mgmt that patient may not be appropriate to return to home with wife. Case mgmt spoke to patient's daughter, plan is for skilled nursing facility on discharge.

## 2023-01-23 NOTE — PROGRESS NOTES
[facility] Occupational Therapy      Date: 2023  Patient Name: Suleman Rios        MRN: [id number]  Account: [de-identified]   : 1932  ([address] [facility])  Room: [place] [address]    Chart reviewed, attempted OT at [phone] for evaluation. Patient not seen 2° to:    Pt. off floor for test/procedure     Will attempt again when able.     Electronically signed by YVETTE Walter on  at 3:42 PM

## 2023-01-23 NOTE — PROGRESS NOTES
[facility], [place] Daily Progress Note  Name: Darren Chan  Age: 80 y.o. Gender: male  CodeStatus: Full Code    Primary Cardiology : Dr. Duncan Najera MD/[facility] cardiology      Rounding Cardiology : Dr. Vaughn Mccloud APRN-CNP  Primary Care Provider: Alfonso Arauz DO  Admission Date: 1/19/2023    CARDIOLOGIST NOTE  I have personally performed a complete face to face history and physical on this patient. I have reviewed the notations as entered by NP Joellen Mccloud I have personally  formulated the impression and plan on this patient and amended as necessary. Lynsey Mccallum MD [facility]    Patient offers no specific complaints perhaps slightly short of breath when he moves about in the bed. Lower extremity edema persist.    I have reviewed reviewed echocardiogram.  Doppler assessment shows peak velocity 2.9 m/s with a mean gradient 25 mmHg this is suggest moderate aortic stenosis. Visually there is very little movement of the aortic valve with all 3 leaflets densely calcified. This may well represent low gradient severe aortic stenosis. Certainly the murmur is extraordinarily prominent and coarse and prolonged. Impression  1. CHF/diastolic: Mild volume overload on exam.  Would favor chronic diuretic to keep LVEDP low but with the setting of severe aortic stenosis do not want to over diurese. 2.  Likely severe aortic stenosis as opposed to moderate: Given his dementia and functional status and advanced age would not proceed with intervention but certainly need to slow heart rate down and would begin Toprol at 25 continue nitrates to keep LVEDP low if possible and diuretic at low dose. 3.  Edema: I believe this is still related to increased venous pressures. Hence diuretic. Ok to nursing home when room available.   Bony Ashraf MD        Chief complaint/Associated symptoms:   Darren Chan was seen and examined at bedside     Assessment:  Pneumonia: Antibiotics per medicine, blood cultures no growth to date. Diastolic CHF; Lasix IV has been discontinued, currently not on any po diuretics. There are no home medications listed and he is unable to tell me his home medications. Edema; Continues to have bilateral  lower extremity edema. Plan:  Monitor on telemetry  Continue Asa and Imdur  Pending carotid ultrasound. Further recommendations per Dr. Josafat Mccallum      Physical Exam  Constitutional:  Well developed, awake/alert/ no distress, alert and cooperative. Respiratory/Thorax: Scattered rhonchi  Cardiovascular: Regular, rate and rhythm, 3/6 murmur,  normal S1 and S2, PMI non displaced. Gastrointestinal:  Non distended, soft, non-tender, no rebound tenderness or guarding, Genitourinary:  deferred  Musculoskeletal:  No apparent injury. Extremities:  No cyanosis, bilateral lower extremity edema,   Neurological:  Alert and oriented x3. Moves extremities spontaneous with purpose  Psychological:  Appropriate mood and behavior  Skin:  Warm and dry,  no lesions or rashes. Allergies: No Known Allergies    Medications:  Reviewed  Home Medications    Infusion Medications:   Scheduled Medications:    aspirin  81 mg Oral Daily    isosorbide mononitrate  30 mg Oral Daily    enoxaparin  40 mg SubCUTAneous Daily    cefTRIAXone (ROCEPHIN) IV  1,000 mg IntraVENous Q24H    guaiFENesin  600 mg Oral BID    miconazole   Topical BID     PRN Meds: ondansetron **OR** ondansetron, acetaminophen **OR** acetaminophen, ipratropium-albuterol    Vitals  Vitals:    01/23/23 0730   BP: 126/77   Pulse: 87   Resp: 16   Temp: 97.1 °F (36.2 °C)   SpO2: 95%       I&O  No documented urine output    Telemetry:      ECHO: 1/20/2023  Left ventricular ejection fraction is visually estimated at 60-65%. Moderate concentric left ventricular hypertrophy. Impaired relaxation compatible with diastolic dysfunction. ( reversed E/A   ratio)   Abnormal diastolic dysfunction by tissue doppler index.    Mild (1+) mitral regurgitation is present. Mild-to-moderate mitral stenosis. The mitral valve area estimated by pressure half-time method is 2.4 . Calcification of the mitral valve noted--severe with perhaps Mitral   Valvular ring ? ? vs senile calcification   Severely thickened trileaflet aortic valve with decresed excursion. The aortic valve area is 1.5 cm2 with a maximum gradient of 37 mmHg and a   mean gradient of 23 mmHg. --moderate aortic valve stenosis   There is mild ( 1 +) tricuspid regurgitation with estimated RVSP of 38 mm   Hg. Mildly dilated left atrium. The right ventricle was not clearly visualized . Normal right ventricular size with preserved RV function. No previous exam to compare      Labs:   Recent Labs     01/21/23 0519 01/22/23  0515 01/23/23  0559   WBC 11.9* 9.4 8.2   HGB 11.6* 12.1* 12.2*   HCT 34.8* 35.2* 35.6*    243 254     Recent Labs     01/21/23 0519 01/22/23  0515 01/23/23  0559    138 137   K 3.9 4.4 4.0    101 102   CO2 25 23 23   BUN 21 18 13   CREATININE 0.96 0.79 0.81   CALCIUM 8.0* 8.4* 8.4*     No results for input(s): AST, ALT, BILIDIR, BILITOT, ALKPHOS in the last 72 hours. No results for input(s): INR in the last 72 hours. No results for input(s): Noemy [place] in the last 72 hours. Urinalysis:   No results found for: Higinio Kugel, BACTERIA, RBCUA, Julio Vini, Jaiden [place] [address]    Radiology:   01/19/23    CT CHEST W CONTRAST    Narrative  EXAMINATION:  CT OF THE CHEST WITH CONTRAST 1/19/2023 1:24 pm    TECHNIQUE:  CT of the chest was performed with the administration of intravenous  contrast. Multiplanar reformatted images are provided for review. Automated  exposure control, iterative reconstruction, and/or weight based adjustment of  the mA/kV was utilized to reduce the radiation dose to as low as reasonably  achievable. COMPARISON:  None.     HISTORY:  ORDERING SYSTEM PROVIDED HISTORY: cough  TECHNOLOGIST PROVIDED HISTORY:  Reason for exam:->cough  Decision Support Exception - unselect if not a suspected or confirmed  emergency medical condition->Emergency Medical Condition (MA)  What reading provider will be dictating this exam?->CRC    FINDINGS:  Lungs/pleura: Up to approximately 10 cm somewhat rounded dense consolidation  with central air bronchograms, predominantly within the anterior/inferior  aspect of the right upper lobe most consistent with bronchopneumonia. No  definite underlying mass, organized fluid collection, or significant pleural  effusion. Shallow inspiratory volumes with mild to moderate interstitial  infiltrates, worsening inferiorly. No other significant nodules, within the  limits of the study. Mediastinum: Mild lower mediastinal lymphadenopathy, likely reactive. The  thoracic aorta is moderately a unfolded and ectatic with mild atherosclerotic  plaquing. The heart is borderline enlarged with extensive mitral valve,  aortic valve and coronary artery calcifications and/or stents. No  significant pericardial effusion. Coarsely calcified left hilar lymph nodes. Moderate to large-sized hiatal hernia, without acute complication. Soft Tissues/Bones: Mild-to-moderate degenerative changes and mild chronic  compression deformities of the T1, T2 and T9 vertebral bodies. No worrisome  bone destruction or acute fractures identified. Mild bilateral gynecomastia. Upper Abdomen: Noncontributory. Impression  Dense rounded predominantly right upper lob consolidation, most consistent  with bronchopneumonia. Interstitial infiltrates on low lung volumes, which may be edema and or  interstitial lung disease such as UIP. Mild probably reactive lower mediastinal lymphadenopathy. Moderate to large-sized hiatal hernia and other chronic findings, as noted. RECOMMENDATIONS:  Follow-up imaging to document resolution. WITHOUT CONTRAST: No results found for this or any previous visit.       CXR 2-view: Results for orders placed during the hospital encounter of 01/19/23    XR CHEST (2 VW)    Narrative  EXAMINATION:  TWO XRAY VIEWS OF THE CHEST    1/19/2023 11:36 am    COMPARISON:  None. HISTORY:  ORDERING SYSTEM PROVIDED HISTORY: cough  TECHNOLOGIST PROVIDED HISTORY:  Reason for exam:->cough  What reading provider will be dictating this exam?->CRC    FINDINGS:  Two views the chest were obtained. The heart is enlarged. There is a masslike opacity seen within the left perihilar region measuring  5.5 x 4.2 cm. No right pleural effusion is noted. The left lung is clear. Note is made of a hiatal hernia. Impression  5.5 x 4.2 cm masslike opacity seen within the right perihilar region likely  representing pneumonia. Given that there is a masslike appearance dedicated  CT of the chest is recommended to exclude underlying pathology. Portable: Results for orders placed during the hospital encounter of 09/02/22    XR CHEST PORTABLE    Narrative  EXAMINATION:  PORTABLE CHEST RADIOGRAPH. CLINICAL HISTORY:  TRAUMA. COMPARISONS:  NONE AVAILABLE    TECHNIQUE:  Frontal view chest.    FINDINGS:  Heart is enlarged. Rounded density projected over the lower thoracic midline could represent either tortuous aorta or large esophageal hiatal hernia. Nodular calcifications overlying the left hilum probably representing chronic granulomatous  jael residual.    Horizontal linear density near the lateral left lung base probably represents linear scar or atelectasis. There may be mild atelectasis at either lung base. Remaining lung fields are otherwise clear. There is no significant pleural fluid or pneumothorax. Osseous structures are grossly intact. Impression  1. CARDIOMEGALY. 2. MILD BASILAR ATELECTASIS. 3. POSSIBLE LARGE ESOPHAGEAL HIATAL HERNIA. 4. NO OTHER SIGNIFICANT ACUTE INTRATHORACIC ABNORMALITY.           Active Hospital Problems    Diagnosis Date Noted    Community acquired pneumonia due to Mycoplasma pneumoniae [J15.7] 01/19/2023     Priority: Medium       Additional work up or/and treatment plan may be added today or then after based on clinical progression. I am managing a portion of pt care. Some medical issues are handled by other specialists. Additional work up and treatment should be done in out pt setting by pt PCP and other out pt providers. In addition to examining and evaluating pt, I spent additional time explaining care, normaland abnormal findings, and treatment plan. All of pt questions were answered. Counseling, diet and education were provided. Case will be discussed with nursing staff when appropriate. Family will be updated if and whenappropriate.       Electronically signed by AZEB Carter CNP on 1/23/2023 at 9:19 AM

## 2023-01-23 NOTE — PLAN OF CARE
Therapy evaluation completed. Please see daily notes and/or progress notes for details related to planned treatment interventions, goals and functional performance. Patient/Caregiver provided printed discharge information.

## 2023-01-23 NOTE — DISCHARGE INSTRUCTIONS
YOU WILL NEED TO HAVE LABS DRAWN IN 2 WEEKS, AN ORDER WILL BE PROVIDED FOR YOU UPON DISCHARGE. A FOLLOW UP APPOINTMENT WITH DR. Nica Rivas AT [address] ARRANGED AS NOTED ON YOUR DISCHARGE INSTRUCTIONS.

## 2023-01-23 NOTE — PROGRESS NOTES
Physical Therapy Med Surg Initial Assessment  [facility]ility[facility]Department: [facility]  [id number]       NAME: Eric Pereyra  : 1932 (80 y.o.)  MRN: [id number]  CODE STATUS: Full Code    Date of Service: 2023    Patient Diagnosis(es): Hypoxia [R09.02]  Community acquired pneumonia due to Mycoplasma pneumoniae [J15.7]  Community acquired pneumonia of right middle lobe of lung [J18.9]   Chief Complaint   Patient presents with    Fall     Patient Active Problem List    Diagnosis Date Noted    Community acquired pneumonia due to Mycoplasma pneumoniae 2023        History reviewed. No pertinent past medical history. History reviewed. No pertinent surgical history. Chart Reviewed: Yes  Family / Caregiver Present: No  Diagnosis: Community acquired pneumonia due to Mycoplasma pneumoniae  General Comment  Comments: Pt resting in bed - agreeable to PT evaluation    Restrictions:  Restrictions/Precautions: Fall Risk  Position Activity Restriction  Other position/activity restrictions: [employer]     SUBJECTIVE:   Subjective: \"I don't even know where my wife is. \"    Pain  Pain: Denies pre and post session pain. Prior Level of Function:  Social/Functional History  Lives With: Spouse  Type of Home: House  Home Layout: One level  Home Access:  (unknown)  Bathroom Shower/Tub:  (unknown)  Bathroom Equipment:  (unknown)  ADL Assistance: Independent  Ambulation Assistance: Independent (without AD)  Transfer Assistance: Independent  Additional Comments: pt confused and questionable historian. Unable to recall details of home set up or PLOF. OBJECTIVE:   Hearing: Within functional limits    Cognition:  Orientation Level: Oriented to person, Disoriented to situation, Disoriented to time, Disoriented to place  Follows Commands: Within Functional Limits    Observation/Palpation  Observation: No acute distress noted. Pleasant yet significantly confused.  Noted edema B ankles/feet    ROM:  RLE PROM: WFL  LLE PROM: WFL    Strength:  Strength RLE  Comment: Grossly 2+/5 at knee; 2-/5 at hip  Strength LLE  Comment: Grossly 2+/5 at knee; 2-/5 at hip  Strength Other  Other: Trunk strength grossly 2/5    Neuro:  Balance  Sitting - Static: Good  Sitting - Dynamic: Good;-  Standing - Static: Fair;+  Standing - Dynamic: Fair  Comments: Dealyed righting responses. Poor anticipatory control                      Tone: Normal    Sensation: Intact    Bed mobility  Rolling to Left: Stand by assistance  Supine to Sit: Contact guard assistance  Bed Mobility Comments: increased time to complete    Transfers  Sit to Stand: Minimal Assistance  Stand to Sit: Contact guard assistance  Bed to Chair: Contact guard assistance  Comment: Initial lifting assist required for safety. Verbal cues for hand placement and sequencing    Ambulation  Surface: Level tile  Device: Rolling Walker  Assistance: Contact guard assistance  Quality of Gait: FF over [employer]. Verbal cues and tactile reinforcement for posture and approximation within [employer] as well as safety managing hosp room obstacles. Distance: 25ft X 2                   Activity Tolerance  Activity Tolerance: Patient tolerated treatment well;Treatment limited secondary to decreased cognition    Patient Education  Education Given To: Patient  Education Provided: Role of Therapy;Plan of Care  Education Method: Verbal  Barriers to Learning: Cognition  Education Outcome: Continued education needed       ASSESSMENT:   Body Structures, Functions, Activity Limitations Requiring Skilled Therapeutic Intervention: Decreased functional mobility ; Decreased ADL status; Decreased ROM; Decreased strength;Decreased safe awareness;Decreased endurance;Decreased balance;Decreased vision/visual deficit; Decreased coordination;Decreased tolerance to work activity; Decreased cognition  Decision Making: Medium Complexity  History: High  Exam: Med  Clinical Presentation: High    Therapy Prognosis: Good  Barriers to Learning: confusion         DISCHARGE RECOMMENDATIONS:  Discharge Recommendations: Continue to assess pending progress, Patient would benefit from continued therapy after discharge    Assessment: Continued PT indicated to progress mobility and faciltiate DC at highest level of indep and safety. Concerns for pt returning home d/t severity of confusion and mobility deficits creating increased risk for falls. Rec therapy stay prior to DC home. Rec 24/7 assist.  Requires PT Follow-Up: Yes       PLAN OF CARE:  Physcial Therapy Plan  General Plan: 1 time a day 3-6 times a week  Current Treatment Recommendations: Strengthening, ROM, Balance training, Functional mobility training, Transfer training, ADL/Self-care training, Cognitive/Perceptual training, Endurance training, Gait training, Stair training, Neuromuscular re-education, Home exercise program, Safety education & training, Patient/Caregiver education & training, Equipment evaluation, education, & procurement    Safety Devices  Type of Devices: All fall risk precautions in place    Goals:  Long Term Goals  Long Term Goal 1: Pt to complete bed mobility with supervision  Long Term Goal 2: Pt to complete transfers with supervision  Long Term Goal 3: Pt to ambulate 50-150ft with LRD and supervision  Long Term Goal 4: Pt to manage 4 steps with HR and SBA  Long Term Goal 5: Pt to complete TUG with 09FKH    [facility] (6 CLICK) BASIC MOBILITY  AM-PAC Inpatient Mobility Raw Score : 18     Therapy Time:   Individual   Time In 1055   Time Out 1113   Minutes [address] [place] [address], 01/23/23 at 12:15 PM         Definitions for assistance levels  Independent = pt does not require any physical supervision or assistance from another person for activity completion. Device may be needed.   Stand by assistance = pt requires verbal cues or instructions from another person, close to but not touching, to perform the activity  Minimal assistance= pt performs 75% or more of the activity; assistance is required to complete the activity  Moderate assistance= pt performs 50% of the activity; assistance is required to complete the activity  Maximal assistance = pt performs 25% of the activity; assistance is required to complete the activity  Dependent = pt requires total physical assistance to accomplish the task

## 2023-01-23 NOTE — DISCHARGE SUMMARY
Discharge Summary    Date: 1/23/2023  Patient Name: Mary Anne Mercer    YOB: 1932     Age: 80 y.o. Admit Date: 1/19/2023  Discharge Date: 1/23/2023  Discharge Condition: [address]    Admission Diagnosis  Hypoxia [R09.02]; Community acquired pneumonia due to Mycoplasma pneumoniae [J15.7]; Community acquired pneumonia of right middle lobe of lung [J18.9]      Discharge Diagnosis  Principal Problem:    Community acquired pneumonia due to Mycoplasma pneumoniae  Resolved Problems:    * No resolved hospital problems. [facility] AND [facility] Stay  Narrative of Hospital Course:  Patient comes for possible community-acquired pneumonia received IV antibiotics. Cultures have been negative. Procalcitonin is negative. Antibiotics will be de-escalated. Patient was discharged on p.o. Ceftin. Patient was eval by cardiology for chronic lower extremity edema and heart murmur and valvular disease. Consultants:  IP CONSULT TO CARDIOLOGY  IP CONSULT TO CASE MANAGEMENT    Surgeries/procedures Performed:      Treatments:            Discharge Plan/Disposition:  Home    Hospital/Incidental Findings Requiring Follow Up:    Patient Instructions:    Diet:    Activity:  For number of days (if applicable): Other Instructions:    Provider Follow-Up:   No follow-ups on file. Significant Diagnostic Studies:    Recent Labs:  Admission on 01/19/2023  No results displayed because visit has over 200 results. ------------    Radiology last 7 days:  XR CHEST (2 VW)    Result Date: 1/19/2023  5.5 x 4.2 cm masslike opacity seen within the right perihilar region likely representing pneumonia. Given that there is a masslike appearance dedicated CT of the chest is recommended to exclude underlying pathology. CT CHEST W CONTRAST    Result Date: 1/19/2023  Dense rounded predominantly right upper lob consolidation, most consistent with bronchopneumonia.  Interstitial infiltrates on low lung volumes, which may be edema and or interstitial lung disease such as UIP. Mild probably reactive lower mediastinal lymphadenopathy. Moderate to large-sized hiatal hernia and other chronic findings, as noted. RECOMMENDATIONS: Follow-up imaging to document resolution. Pending Labs     Order Current Status    LEGIONELLA ANTIGEN, URINE Collected (01/20/23 0051)    Blood Culture 1 Preliminary result    Blood Culture 2 Preliminary result        Discharge Medications    Current Discharge Medication List    START taking these medications    miconazole (MICOTIN) 2 % powder  Apply topically 2 times daily. Qty: 45 g Refills: 1    guaiFENesin (MUCINEX) 600 MG extended release tablet  Take 1 tablet by mouth 2 times daily for 14 days  Qty: 28 tablet Refills: 0    aspirin 81 MG chewable tablet  Take 1 tablet by mouth daily  Qty: 30 tablet Refills: 3    isosorbide mononitrate (IMDUR) 30 MG extended release tablet  Take 1 tablet by mouth daily  Qty: 30 tablet Refills: 3    ipratropium-albuterol (DUONEB) 0.5-2.5 (3) MG/3ML SOLN nebulizer solution  Inhale 3 mLs into the lungs every 4 hours as needed for Shortness of Breath  Qty: 360 mL Refills: 1    cefUROXime (CEFTIN) 500 MG tablet  Take 1 tablet by mouth 2 times daily for 5 days  Qty: 10 tablet Refills: 0          Current Discharge Medication List        Current Discharge Medication List        Current Discharge Medication List        Time Spent on Discharge:  minutes were spent in patient examination, evaluation, counseling as well as medication reconciliation, prescriptions for required medications, discharge plan, and follow up.     Electronically signed by Senia Heath MD on 1/23/23 at 11:56 AM EST   Overtime on dc summary was 45 min

## 2023-01-23 NOTE — CARE COORDINATION
LSW spoke with pt's daughter Jace Landau to discuss DC plan for today. We discussed the PT evaluation and the daughter is willing to look at [facility] for therapy. Referral was sent to [facility] for review. [facility] has accepted the pt for admission today if medically cleared. [facility] ARRANGED FOR 9AM TOMORROW TO GO TO [facility]. FAMILY AWARE AND AGREEABLE.

## 2023-01-24 VITALS
HEIGHT: 72 IN | WEIGHT: 195.5 LBS | TEMPERATURE: 98.3 F | SYSTOLIC BLOOD PRESSURE: 155 MMHG | HEART RATE: 99 BPM | OXYGEN SATURATION: 97 % | RESPIRATION RATE: 20 BRPM | DIASTOLIC BLOOD PRESSURE: 84 MMHG | BODY MASS INDEX: 26.48 KG/M2

## 2023-01-24 LAB
ANION GAP SERPL CALCULATED.3IONS-SCNC: 11 MEQ/L (ref 9–15)
BASOPHILS ABSOLUTE: 0.1 K/UL (ref 0–0.2)
BASOPHILS RELATIVE PERCENT: 1.4 %
BLOOD CULTURE, ROUTINE: NORMAL
BLOOD CULTURE, ROUTINE: NORMAL
BUN BLDV-MCNC: 14 MG/DL (ref 8–23)
CALCIUM SERPL-MCNC: 8.3 MG/DL (ref 8.5–9.9)
CHLORIDE BLD-SCNC: 100 MEQ/L (ref 95–107)
CO2: 26 MEQ/L (ref 20–31)
CREAT SERPL-MCNC: 0.88 MG/DL (ref 0.7–1.2)
EOSINOPHILS ABSOLUTE: 0.4 K/UL (ref 0–0.7)
EOSINOPHILS RELATIVE PERCENT: 4.6 %
GFR SERPL CREATININE-BSD FRML MDRD: >60 ML/MIN/{1.73_M2}
GLUCOSE BLD-MCNC: 103 MG/DL (ref 70–99)
HCT VFR BLD CALC: 34.9 % (ref 42–52)
HEMOGLOBIN: 11.9 G/DL (ref 14–18)
LYMPHOCYTES ABSOLUTE: 1.4 K/UL (ref 1–4.8)
LYMPHOCYTES RELATIVE PERCENT: 17 %
MCH RBC QN AUTO: 30.9 PG (ref 27–31.3)
MCHC RBC AUTO-ENTMCNC: 34.1 % (ref 33–37)
MCV RBC AUTO: 90.6 FL (ref 79–92.2)
MONOCYTES ABSOLUTE: 1 K/UL (ref 0.2–0.8)
MONOCYTES RELATIVE PERCENT: 12.1 %
NEUTROPHILS ABSOLUTE: 5.3 K/UL (ref 1.4–6.5)
NEUTROPHILS RELATIVE PERCENT: 64.9 %
PDW BLD-RTO: 14 % (ref 11.5–14.5)
PLATELET # BLD: 252 K/UL (ref 130–400)
POTASSIUM REFLEX MAGNESIUM: 3.9 MEQ/L (ref 3.4–4.9)
RBC # BLD: 3.85 M/UL (ref 4.7–6.1)
SODIUM BLD-SCNC: 137 MEQ/L (ref 135–144)
WBC # BLD: 8.2 K/UL (ref 4.8–10.8)

## 2023-01-24 PROCEDURE — 85025 COMPLETE CBC W/AUTO DIFF WBC: CPT

## 2023-01-24 PROCEDURE — 36415 COLL VENOUS BLD VENIPUNCTURE: CPT

## 2023-01-24 PROCEDURE — 6370000000 HC RX 637 (ALT 250 FOR IP): Performed by: INTERNAL MEDICINE

## 2023-01-24 PROCEDURE — 6370000000 HC RX 637 (ALT 250 FOR IP): Performed by: NURSE PRACTITIONER

## 2023-01-24 PROCEDURE — 80048 BASIC METABOLIC PNL TOTAL CA: CPT

## 2023-01-24 PROCEDURE — 6360000002 HC RX W HCPCS: Performed by: INTERNAL MEDICINE

## 2023-01-24 RX ADMIN — ASPIRIN 81 MG: 81 TABLET, CHEWABLE ORAL at 08:35

## 2023-01-24 RX ADMIN — MICONAZOLE NITRATE: 2 POWDER TOPICAL at 08:38

## 2023-01-24 RX ADMIN — ENOXAPARIN SODIUM 40 MG: 100 INJECTION SUBCUTANEOUS at 08:32

## 2023-01-24 RX ADMIN — ISOSORBIDE MONONITRATE 30 MG: 60 TABLET, EXTENDED RELEASE ORAL at 08:28

## 2023-01-24 RX ADMIN — TORSEMIDE 20 MG: 20 TABLET ORAL at 08:35

## 2023-01-24 RX ADMIN — METOPROLOL SUCCINATE 25 MG: 25 TABLET, EXTENDED RELEASE ORAL at 08:32

## 2023-01-24 RX ADMIN — GUAIFENESIN 600 MG: 600 TABLET ORAL at 08:34

## 2023-01-24 NOTE — PROGRESS NOTES
Attempts made to give nursing report to [facility] x 3 this morning. Spoke to [place]  who states is difficult to find someone d/t morning med pass. Voice message left in general mail box as well as on the answering service of \"Lillian\", for return call for nursing report on pt with a 9am  time that is still here at facility. Awaiting return call at this time, no pt identifiers left on answering machines.

## 2023-01-24 NOTE — PLAN OF CARE
Problem: Skin/Tissue Integrity  Goal: Absence of new skin breakdown  Description: 1. Monitor for areas of redness and/or skin breakdown  2. Assess vascular access sites hourly  3. Every 4-6 hours minimum:  Change oxygen saturation probe site  4. Every 4-6 hours:  If on nasal continuous positive airway pressure, respiratory therapy assess nares and determine need for appliance change or resting period.   Outcome: Adequate for Discharge     Problem: Safety - Adult  Goal: Free from fall injury  Outcome: Adequate for Discharge     Problem: ABCDS Injury Assessment  Goal: Absence of physical injury  Outcome: Adequate for Discharge

## 2023-01-24 NOTE — FLOWSHEET NOTE
Discharge order placed and patient scheduled to be picked up at 0900. Message left with answering service for Dr. Jessica Shultz to call this nurse regarding clearance for discharge and discharge cardiac medication review.   Electronically signed by Aziza Adam RN on 1/24/2023 at 7:54 AM

## 2023-01-24 NOTE — PROGRESS NOTES
Physical Therapy  Facility/Department: [facility] A487/[id number]  Physical Therapy Discharge      NAME: March Grmay    : 1932 (80 y.o.)  MRN: [id number]    Account: [de-identified]  Gender: male      Patient has been discharged from acute care hospital. DC patient from current PT program.      Electronically signed by Trice Velásquez, PT on 23 at 1:59 PM EST normal...

## 2023-01-24 NOTE — FLOWSHEET NOTE
Discharging to [facility]. Telemetry and IV discontinued without difficulty. Discharge instructions reviewed with patient; pleasantly confused. Medications discussed and reviewed including side effects. Advised of follow=up appointments. Large amount urine per brief. Complete nadja care with brief change. Dressed patient. Transport at bedside.   Electronically signed by Jose Luevano RN on 1/24/2023 at 11:47 AM

## 2023-01-25 ENCOUNTER — OFFICE VISIT (OUTPATIENT)
Dept: GERIATRIC MEDICINE | Age: 88
End: 2023-01-25

## 2023-01-25 DIAGNOSIS — J15.7: Primary | ICD-10-CM

## 2023-01-25 LAB
BUN BLDV-MCNC: 19 MG/DL
CALCIUM SERPL-MCNC: 8.4 MG/DL
CHLORIDE BLD-SCNC: 102 MMOL/L
CO2: 28 MMOL/L
CREAT SERPL-MCNC: 0.9 MG/DL
GFR SERPL CREATININE-BSD FRML MDRD: NORMAL ML/MIN/{1.73_M2}
GLUCOSE BLD-MCNC: 96 MG/DL
POTASSIUM SERPL-SCNC: 3.9 MMOL/L
SODIUM BLD-SCNC: 142 MMOL/L

## 2023-01-25 NOTE — PROGRESS NOTES
Physician Progress Note      PATIENT:               Armand Lora  [facility] #:                  [id number]  :                       1932  ADMIT DATE:       2023 10:38 AM  DISCH DATE:        2023 12:38 PM  RESPONDING  PROVIDER #:        Stuart Marks MD          QUERY TEXT:    Pt admitted with pneumonia. Pt noted to have dysphagia and \"Acute bacterial   pneumonia: aspiration coverage and cap coverage with ceftriaxone,   azithromycin, flagyl. Pro calcitonin remains negative making diagnosis of   pneumonitis more likely. \" documented by Dr. Ana Bob with \"Community acquired   pneumonia due to Mycoplasma pneumoniae. \" documented by Cardiology and   documented and dated on  by Dr. Ana Bob in the Active Problems List.  If   possible, please document in the progress notes and discharge summary if you   are evaluating and/or treating any of the following:    Note: CAP and HCAP indicate where the pneumonia was acquired, not a specific   type. The medical record reflects the following:  Risk Factors: Mild to moderate oral and pharyngeal dysphagia  Clinical Indicators: Procalcitonin . 12-.08, WBC 16.4; CXR: Dense rounded   predominantly right upper lob consolidation, most consistent with   bronchopneumonia. Interstitial infiltrates on low lung volumes, which may be   edema and or interstitial lung disease such as UIP.   Treatment: Bronchodilators, Mucinex, Zithromax, Rocephin, Flagyl, Legionella,   MBS, soft bite-size diet with mildly thick liquids, aspiration precautions,   labs and monitoring    Thank you,  Kiel Monahan   Clinical Documentation Improvement Specialist  W: [phone]  Options provided:  -- Aspiration pneumonia confirmed present on admission with pneumonia due to   Mycoplasma pneumoniae ruled out  -- Pneumonia due to Mycoplasma pneumoniae confirmed with aspiration pneumonia   ruled out  -- Both Aspiration pneumonia confirmed present on admission and pneumonia due   to Mycoplasma pneumoniae  -- Other - I will add my own diagnosis  -- Disagree - Not applicable / Not valid  -- Disagree - Clinically unable to determine / Unknown  -- Refer to Clinical Documentation Reviewer    PROVIDER RESPONSE TEXT:    This patient has aspiration pneumonia confirmed present on admission with   pneumonia due to Mycoplasma pneumoniae ruled out.     Query created by: Chapo Acuna on 1/23/2023 12:21 PM      Electronically signed by:  Alex Gatica MD 1/25/2023 1:13 PM

## 2023-01-30 LAB
BASOPHILS ABSOLUTE: 0.1 /ΜL
BASOPHILS RELATIVE PERCENT: 1.2 %
BUN BLDV-MCNC: 22 MG/DL
CALCIUM SERPL-MCNC: 8.8 MG/DL
CHLORIDE BLD-SCNC: 97 MMOL/L
CO2: 31 MMOL/L
CREAT SERPL-MCNC: 1 MG/DL
EOSINOPHILS ABSOLUTE: 0.4 /ΜL
EOSINOPHILS RELATIVE PERCENT: 5 %
GFR SERPL CREATININE-BSD FRML MDRD: NORMAL ML/MIN/{1.73_M2}
GLUCOSE BLD-MCNC: 132 MG/DL
HCT VFR BLD CALC: 38.7 % (ref 41–53)
HEMOGLOBIN: 13.1 G/DL (ref 13.5–17.5)
LYMPHOCYTES ABSOLUTE: 1.5 /ΜL
LYMPHOCYTES RELATIVE PERCENT: 19.2 %
MCH RBC QN AUTO: 30.8 PG
MCHC RBC AUTO-ENTMCNC: 33.9 G/DL
MCV RBC AUTO: 90.8 FL
MONOCYTES ABSOLUTE: 0.6 /ΜL
MONOCYTES RELATIVE PERCENT: 7.7 %
NEUTROPHILS ABSOLUTE: 5.1 /ΜL
NEUTROPHILS RELATIVE PERCENT: 66.9 %
PLATELET # BLD: 367 K/ΜL
PMV BLD AUTO: 8.5 FL
POTASSIUM SERPL-SCNC: 4 MMOL/L
RBC # BLD: 4.26 10^6/ΜL
SODIUM BLD-SCNC: 138 MMOL/L
WBC # BLD: 7.6 10^3/ML

## 2023-01-30 NOTE — PROGRESS NOTES
Physician Progress Note      PATIENT:               BETITO MARIN  CSN #:                  [id number]  :                       1932  ADMIT DATE:       2023 10:38 AM  DISCH DATE:        2023 12:38 PM  RESPONDING  PROVIDER #:        Tien Cevallos MD          QUERY TEXT:    Patient admitted with Aspiration pneumonia.   Noted conflicting documentation   of acute on chronic diastolic heart failure in the Dr. Cevallos Cardiology   consult note with chronic diastolic heart failure documented in Dr. Mendez    &  Cardiology PN.  If possible, please document in progress notes and discharge summary if you   are evaluating and /or treating any of the following:    The medical record reflects the following:  Risk Factors: chronic diastolic heart failure, Multi-valvular heart disease,   Pulmonary Hypertension  Clinical Indicators: SOB, 3+ Pitting BLE edema, crackles bialteral bases,   diminished lung sounds, Cardiac exam shows a very coarse 3-4 over 6 MR murmur,   a barely discernible mitral stenosis murmur and a 2/6 aortic stenosis murmur.   Dr. Cevallos consult: \"Diastolic CHF acute on chronic related to moderate mitral   stenosis, mild to moderate mitral vegetation and moderate aortic stenosis.    He is mildly volume overloaded would gently diurese.\" Dr. Mendez  PN: \"   Interstitial infiltrates on low lung volumes which may be edema and/or   interstitial lung disease. Chronic diastolic heart  Treatment: Cardiology consult, IV Lasix 20mg x1, Demadex 20mg daily, Imdur   30mg daily, Toprol XL 25mg daily, ECHO, I&O, daily weights, low sodium fluid   restricted diet, labs and monitoring    Thank you,  Karyn Sawyer   Clinical Documentation Improvement Specialist  W: [phone]  Options provided:  -- Acute on chronic diastolic heart failure confirmed  -- Chronic diastolic heart failure confirmed  -- Other - I will add my own diagnosis  -- Disagree - Not applicable / Not valid  -- Disagree - Clinically  unable to determine / Unknown  -- Refer to Clinical Documentation Reviewer    PROVIDER RESPONSE TEXT:    After study, Acute on chronic diastolic heart failure confirmed.     Query created by: Tavo Cruz on 1/30/2023 8:22 AM      Electronically signed by:  Vaishali Khan MD 1/30/2023 2:49 PM

## 2023-02-07 ENCOUNTER — OFFICE VISIT (OUTPATIENT)
Dept: GERIATRIC MEDICINE | Age: 88
End: 2023-02-07
Payer: MEDICARE

## 2023-02-07 DIAGNOSIS — I35.0 NONRHEUMATIC AORTIC VALVE STENOSIS: ICD-10-CM

## 2023-02-07 DIAGNOSIS — I65.29 STENOSIS OF CAROTID ARTERY, UNSPECIFIED LATERALITY: ICD-10-CM

## 2023-02-07 DIAGNOSIS — I34.0 MITRAL VALVE INSUFFICIENCY, UNSPECIFIED ETIOLOGY: Primary | ICD-10-CM

## 2023-02-07 DIAGNOSIS — E78.5 DYSLIPIDEMIA: ICD-10-CM

## 2023-02-07 PROCEDURE — 99344 HOME/RES VST NEW MOD MDM 60: CPT | Performed by: PHYSICIAN ASSISTANT

## 2023-02-07 PROCEDURE — 1123F ACP DISCUSS/DSCN MKR DOCD: CPT | Performed by: PHYSICIAN ASSISTANT

## 2023-02-25 NOTE — PROGRESS NOTES
Patient Name: Navarro Rice  Date: 2/24/2023  YOB: 1932  Medical Record Number: [id number]              History of Present Illness:      Review of Systems    Review of Systems: All 14 review of systems negative other than as stated above    Social History     Tobacco Use    Smoking status: Never    Smokeless tobacco: Never   Vaping Use    Vaping Use: Never used   Substance Use Topics    Alcohol use: Never    Drug use: Never         No past medical history on file.        No past surgical history on file.      Current Outpatient Medications on File Prior to Visit   Medication Sig Dispense Refill    aspirin 81 MG chewable tablet Take 1 tablet by mouth daily (Patient taking differently: Take 81 mg by mouth daily Indications: Preventative Treatment) 30 tablet 3    isosorbide mononitrate (IMDUR) 30 MG extended release tablet Take 1 tablet by mouth daily (Patient taking differently: Take 30 mg by mouth daily Indications: High Blood Pressure Disorder) 30 tablet 3    ipratropium-albuterol (DUONEB) 0.5-2.5 (3) MG/3ML SOLN nebulizer solution Inhale 3 mLs into the lungs every 4 hours as needed for Shortness of Breath 360 mL 1    metoprolol succinate (TOPROL XL) 25 MG extended release tablet Take 1 tablet by mouth daily (Patient taking differently: Take 25 mg by mouth daily Indications: High Blood Pressure Disorder) 30 tablet 3    torsemide (DEMADEX) 20 MG tablet Take 1 tablet by mouth daily (Patient taking differently: Take 20 mg by mouth daily Indications: Edema) 30 tablet 3     No current facility-administered medications on file prior to visit.       No Known Allergies      No family history on file.      Physical Exam:      Physical Exam    There were no vitals taken for this visit.      .   Lab Results   Component Value Date    WBC 7.6 01/30/2023    HGB 13.1 (A) 01/30/2023    HCT 38.7 (A) 01/30/2023    MCV 90.8 01/30/2023     01/30/2023     Lab Results   Component Value Date/Time      01/30/2023 12:00 AM    K 4.0 01/30/2023 12:00 AM    K 3.9 01/24/2023 05:44 AM    CL 97 01/30/2023 12:00 AM    CO2 31 01/30/2023 12:00 AM    BUN 22 01/30/2023 12:00 AM    CREATININE 1.0 01/30/2023 12:00 AM    GLUCOSE 132 01/30/2023 12:00 AM    CALCIUM 8.8 01/30/2023 12:00 AM                ASSESSMENT:  Patient Active Problem List   Diagnosis    Community acquired pneumonia due to Mycoplasma pneumoniae         PLAN:   Diagnosis Orders   1. Community acquired pneumonia due to Mycoplasma pneumoniae              Please note orders entered on site at facility after discussion with appropriate facility nursing/therapy/ / nutritional staff. Current longstanding medical problems and acute medical issues addressed with staff. Available data and data elements in on site paper chart reviewed and analyzed. Current external consultant notes reviewed in on site chart. Ordered laboratory testing and imaging will be reviewed when available. This patient's need for psychiatric medication has been reviewed. Will consider further adjustment and possible further evaluations by mental health services.

## 2023-02-28 PROBLEM — I35.0 NONRHEUMATIC AORTIC VALVE STENOSIS: Status: ACTIVE | Noted: 2018-12-01

## 2023-02-28 PROBLEM — I73.9 CLAUDICATION OF GLUTEAL REGION (HCC): Status: ACTIVE | Noted: 2019-07-03

## 2023-02-28 PROBLEM — E78.5 DYSLIPIDEMIA: Status: ACTIVE | Noted: 2018-12-06

## 2023-02-28 PROBLEM — I65.29 STENOSIS OF CAROTID ARTERY: Status: ACTIVE | Noted: 2017-03-16

## 2023-02-28 PROBLEM — I34.0 MITRAL VALVE INSUFFICIENCY: Status: ACTIVE | Noted: 2018-12-01

## 2023-02-28 NOTE — PROGRESS NOTES
Subjective:      Patient ID: Osmany Mullins is a pleasant 80 y.o. male who presents today for:  No chief complaint on file. [address] [facility]    Patient who is a 77-year-old male seen in his room today was seen today as a new admission to assisted living facility with PMH of dementia, pharyngeal dysphagia, hypoxia, edema, hiatal hernia, diastolic CHF, mitral stenosis, and carotid stenosis. Unable to be cared for adequately at home with family. Did speak to patient about his history. Wife was not present but he is living with an AL. Patient is slums score on admission of 9/30. Seems to have grasp on distant history, but short-term memory is scattered. 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 Family friend was present during visit. Patient is currently comfortable at this time, sitting in recliner. No new acute concerns or complaints. Discussed with nursing staff ordering vital signs weekly along with annual labs. Reviewed recent immunizations, patient up-to-date on COVID-19 vaccine. We will assess for Pneumovax and flu as well when patient chart updated. Patient Active Problem List   Diagnosis    Community acquired pneumonia due to Mycoplasma pneumoniae    Claudication of gluteal region [facility])    Dyslipidemia    Mitral valve insufficiency    Nonrheumatic aortic valve stenosis    Stenosis of carotid artery     No past medical history on file. No past surgical history on file.   Social History     Socioeconomic History    Marital status:      Spouse name: Not on file    Number of children: Not on file    Years of education: Not on file    Highest education level: Not on file   Occupational History    Not on file   Tobacco Use    Smoking status: Never    Smokeless tobacco: Never   Vaping Use    Vaping Use: Never used   Substance and Sexual Activity    Alcohol use: Never    Drug use: Never    Sexual activity: Not on file   Other Topics Concern    Not on file   Social History Narrative    Not on file     Social Determinants of Health     Financial Resource Strain: Not on file   Food Insecurity: Not on file   Transportation Needs: Not on file   Physical Activity: Not on file   Stress: Not on file   Social Connections: Not on file   Intimate Partner Violence: Not on file   Housing Stability: Not on file     No family history on file. No Known Allergies      Immunization status: up to date and documented. CURRENT MEDICATIONS:  Aspirin EC 81 mg p.o. daily, isosorbide mononitrate 30 mg p.o. every morning, metoprolol succinate ER 25 mg p.o. every morning, pravastatin 80 mg p.o. every morning, torsemide 20 mg p.o. every morning, APAP 6 and 50 mg p.o. daily as needed, Audrey Ray Lantus suspension 30 mL p.o. daily as needed, ipratropium/albuterol inhaler solution 1 puff every 4-6 hours as needed      Review of Systems   Unable to perform ROS: Dementia   All other systems reviewed and are negative. Objective:   VS: See [facility]. Reviewed. Physical Exam  Vitals (See BRANDEN) reviewed. Constitutional:       General: He is not in acute distress. Appearance: Normal appearance. He is not ill-appearing. HENT:      Head: Normocephalic and atraumatic. Nose: No congestion. Mouth/Throat:      Mouth: Mucous membranes are moist.      Pharynx: Oropharynx is clear. Eyes:      General: No scleral icterus. Extraocular Movements: Extraocular movements intact. Conjunctiva/sclera: Conjunctivae normal.      Pupils: Pupils are equal, round, and reactive to light. Cardiovascular:      Rate and Rhythm: Normal rate and regular rhythm. Pulses: Normal pulses. Heart sounds: Normal heart sounds. Pulmonary:      Effort: Pulmonary effort is normal. No respiratory distress. Breath sounds: Normal breath sounds. No stridor. No wheezing or rhonchi. Abdominal:      General: Bowel sounds are normal. There is no distension. Palpations: Abdomen is soft.       Tenderness: There is no abdominal tenderness. Musculoskeletal:         General: Normal range of motion. Cervical back: Normal range of motion and neck supple. Skin:     General: Skin is warm and dry. Neurological:      Mental Status: He is alert. Mental status is at baseline. He is disoriented. Motor: Weakness present. Psychiatric:         Mood and Affect: Mood normal.         Behavior: Behavior normal.       Assessment:       Diagnosis Orders   1. Mitral valve insufficiency, unspecified etiology        2. Nonrheumatic aortic valve stenosis        3. Stenosis of carotid artery, unspecified laterality        4. Dyslipidemia              Plan:          Continue current medications and care plan parameters. We will add annual labs to regimen (see orders). Continue vital signs q. weekly for the time being to monitor and establish baseline for patient. Monitor for syncope, dizziness, and falls. Utilize DME and will have patient evaluated for possible PT/OT as well. No follow-ups on file. Olaf Caldwell      Please note orders entered on site at facility after discussion with appropriate facility nursing/therapy/ / nutritional staff. Current longstanding medical problems and acute medical issues addressed with staff. Available data and data elements in on site paper chart reviewed and analyzed. Current external consultant notes reviewed in on site chart. Ordered laboratory testing and imaging will be reviewed when available. Side effects, adverse effects of the medication prescribed today, as well as treatment plan and result expectations have been discussed withthe patient who expresses understanding and desires to proceed.     I spent a total of 40 minutes on the date of service which included preparing to see the patient, face-to-face patient care, performing a medically appropriate examination, completing clinical documentation, and on counseling/ eductaing the patient and the family. Please note Nuance Dragon PowerMic III software used for dictation of note,  which may contain minor errors due to ambient noise and indiscriminate speech pickup.

## 2023-03-01 ENCOUNTER — OFFICE VISIT (OUTPATIENT)
Dept: GERIATRIC MEDICINE | Age: 88
End: 2023-03-01
Payer: MEDICARE

## 2023-03-01 DIAGNOSIS — R60.0 BILATERAL LEG EDEMA: ICD-10-CM

## 2023-03-01 DIAGNOSIS — I87.302 STASIS EDEMA OF LEFT LOWER EXTREMITY: Primary | ICD-10-CM

## 2023-03-01 PROCEDURE — 99348 HOME/RES VST EST LOW MDM 30: CPT | Performed by: PHYSICIAN ASSISTANT

## 2023-03-01 PROCEDURE — 1123F ACP DISCUSS/DSCN MKR DOCD: CPT | Performed by: PHYSICIAN ASSISTANT

## 2023-03-23 ENCOUNTER — OFFICE VISIT (OUTPATIENT)
Dept: GERIATRIC MEDICINE | Age: 88
End: 2023-03-23
Payer: MEDICARE

## 2023-03-23 DIAGNOSIS — R60.0 BILATERAL EDEMA OF LOWER EXTREMITY: Primary | ICD-10-CM

## 2023-03-23 DIAGNOSIS — L98.9 ABNORMALITY, SKIN: ICD-10-CM

## 2023-03-23 PROCEDURE — 99348 HOME/RES VST EST LOW MDM 30: CPT | Performed by: PHYSICIAN ASSISTANT

## 2023-03-23 PROCEDURE — 1123F ACP DISCUSS/DSCN MKR DOCD: CPT | Performed by: PHYSICIAN ASSISTANT

## 2023-03-27 NOTE — PROGRESS NOTES
to eval and treat M/W/F. Follow-up as needed for any changes in status. No follow-ups on file. Olaf Grimes      Please note orders entered on site at facility after discussion with appropriate facility nursing/therapy/ / nutritional staff. Current longstanding medical problems and acute medical issues addressed with staff. Available data and data elements in on site paper chart reviewed and analyzed. Current external consultant notes reviewed in on site chart. Ordered laboratory testing and imaging will be reviewed when available. Side effects, adverse effects of the medication prescribed today, as well as treatment plan and result expectations have been discussed withthe patient who expresses understanding and desires to proceed. I spent a total of 30 minutes on the date of service which included preparing to see the patient, face-to-face patient care, performing a medically appropriate examination, completing clinical documentation, and on counseling/ eductaing the patient and the family. Please note Nuance Dragon PowerMic III software used for dictation of note,  which may contain minor errors due to ambient noise and indiscriminate speech pickup.

## 2023-03-29 ASSESSMENT — ENCOUNTER SYMPTOMS
ABDOMINAL PAIN: 0
ABDOMINAL DISTENTION: 0

## 2023-03-29 NOTE — PROGRESS NOTES
Subjective:      Patient ID: Maurizio Barajas is a pleasant 80 y.o. male who presents today for:  No chief complaint on file. 234 [facility]    Patient seen today to follow-up on bilateral lower leg edema. Patient has some weeping blisters to the left anterior leg. Blisters are intermittently weeping. Few scattered areas of increased fluid retention just below the skin, patient is on torsemide 20 mg p.o. daily. Currently not wearing compression. Patient tends to leave his legs dangling below his chair, difficulty keeping compliance up with elevation. Has had history of daily compression. Will reinstitute. We will add [facility] to monitor and treat any open areas as needed in the future. No additional concerns at this time. BP stable. Monitor vital signs as currently ordered. Patient Active Problem List   Diagnosis    Community acquired pneumonia due to Mycoplasma pneumoniae    Claudication of gluteal region [facility])    Dyslipidemia    Mitral valve insufficiency    Nonrheumatic aortic valve stenosis    Stenosis of carotid artery     No past medical history on file. No past surgical history on file.   Social History     Socioeconomic History    Marital status:      Spouse name: Not on file    Number of children: Not on file    Years of education: Not on file    Highest education level: Not on file   Occupational History    Not on file   Tobacco Use    Smoking status: Never    Smokeless tobacco: Never   Vaping Use    Vaping Use: Never used   Substance and Sexual Activity    Alcohol use: Never    Drug use: Never    Sexual activity: Not on file   Other Topics Concern    Not on file   Social History Narrative    Not on file     Social Determinants of Health     Financial Resource Strain: Not on file   Food Insecurity: Not on file   Transportation Needs: Not on file   Physical Activity: Not on file   Stress: Not on file   Social Connections: Not on file   Intimate Partner Violence: Not on

## 2023-04-05 ENCOUNTER — OFFICE VISIT (OUTPATIENT)
Dept: GERIATRIC MEDICINE | Age: 88
End: 2023-04-05
Payer: MEDICARE

## 2023-04-05 DIAGNOSIS — R60.0 EDEMA OF BOTH LOWER LEGS: Primary | ICD-10-CM

## 2023-04-05 DIAGNOSIS — I87.2 VENOUS INSUFFICIENCY (CHRONIC) (PERIPHERAL): ICD-10-CM

## 2023-04-05 PROCEDURE — 1123F ACP DISCUSS/DSCN MKR DOCD: CPT | Performed by: PHYSICIAN ASSISTANT

## 2023-04-05 PROCEDURE — 99347 HOME/RES VST EST SF MDM 20: CPT | Performed by: PHYSICIAN ASSISTANT

## 2023-04-07 DIAGNOSIS — E78.5 DYSLIPIDEMIA: Primary | ICD-10-CM

## 2023-04-07 RX ORDER — PRAVASTATIN SODIUM 80 MG/1
80 TABLET ORAL EVERY EVENING
Qty: 30 TABLET | Refills: 2 | Status: SHIPPED | OUTPATIENT
Start: 2023-04-07 | End: 2024-04-06

## 2023-04-19 DIAGNOSIS — R60.0 BILATERAL EDEMA OF LOWER EXTREMITY: Primary | ICD-10-CM

## 2023-04-19 DIAGNOSIS — Z91.89 AT RISK FOR DEEP VENOUS THROMBOSIS: ICD-10-CM

## 2023-04-19 DIAGNOSIS — I73.9 INTERMITTENT CLAUDICATION (HCC): ICD-10-CM

## 2023-04-26 ENCOUNTER — NURSE ONLY (OUTPATIENT)
Dept: GERIATRIC MEDICINE | Age: 88
End: 2023-04-26
Payer: MEDICARE

## 2023-04-26 DIAGNOSIS — R60.0 BILATERAL LOWER EXTREMITY EDEMA: Primary | ICD-10-CM

## 2023-04-26 DIAGNOSIS — I87.2 VENOUS INSUFFICIENCY (CHRONIC) (PERIPHERAL): ICD-10-CM

## 2023-04-26 DIAGNOSIS — L97.929 VENOUS STASIS ULCER OF LEFT LOWER EXTREMITY (HCC): ICD-10-CM

## 2023-04-26 DIAGNOSIS — I83.029 VENOUS STASIS ULCER OF LEFT LOWER EXTREMITY (HCC): ICD-10-CM

## 2023-04-26 PROCEDURE — 1123F ACP DISCUSS/DSCN MKR DOCD: CPT | Performed by: PHYSICIAN ASSISTANT

## 2023-04-26 PROCEDURE — 99348 HOME/RES VST EST LOW MDM 30: CPT | Performed by: PHYSICIAN ASSISTANT

## 2023-05-03 NOTE — PROGRESS NOTES
Subjective:      Patient ID: Laureano Mohan is a pleasant 80 y.o. male who presents today for:  No chief complaint on file. pl  234 [facility]    Following up with patient s/p recent admission with hx of bilateral lower extremity edema. No new significant findings. Currently stable without any new weeping or cellulitis. Wife states no new significant issues. Progressive dementia. Has been utilizing bathroom more she states with increased diuretic use, but tolerating well. No additional concerns. Patient Active Problem List   Diagnosis    Community acquired pneumonia due to Mycoplasma pneumoniae    Claudication of gluteal region [facility])    Dyslipidemia    Mitral valve insufficiency    Nonrheumatic aortic valve stenosis    Stenosis of carotid artery     No past medical history on file. No past surgical history on file. Social History     Socioeconomic History    Marital status:      Spouse name: Not on file    Number of children: Not on file    Years of education: Not on file    Highest education level: Not on file   Occupational History    Not on file   Tobacco Use    Smoking status: Never    Smokeless tobacco: Never   Vaping Use    Vaping Use: Never used   Substance and Sexual Activity    Alcohol use: Never    Drug use: Never    Sexual activity: Not on file   Other Topics Concern    Not on file   Social History Narrative    Not on file     Social Determinants of Health     Financial Resource Strain: Not on file   Food Insecurity: Not on file   Transportation Needs: Not on file   Physical Activity: Not on file   Stress: Not on file   Social Connections: Not on file   Intimate Partner Violence: Not on file   Housing Stability: Not on file     No family history on file. No Known Allergies        Review of Systems   Unable to perform ROS: Dementia     Objective:   VS: See Shannan Price Reviewed. Physical Exam  Vitals (See BRANDEN) reviewed.    Constitutional:       General: He is not in acute

## 2023-05-05 LAB
ALBUMIN SERPL-MCNC: 3.5 G/DL (ref 3.5–4.6)
ALP SERPL-CCNC: 73 U/L (ref 35–104)
ALT SERPL-CCNC: 10 U/L (ref 0–41)
ANION GAP SERPL CALCULATED.3IONS-SCNC: 10 MEQ/L (ref 9–15)
AST SERPL-CCNC: 15 U/L (ref 0–40)
BASOPHILS # BLD: 0.1 K/UL (ref 0–0.2)
BASOPHILS NFR BLD: 1 %
BILIRUB SERPL-MCNC: 0.5 MG/DL (ref 0.2–0.7)
BUN SERPL-MCNC: 16 MG/DL (ref 8–23)
CALCIUM SERPL-MCNC: 8.5 MG/DL (ref 8.5–9.9)
CHLORIDE SERPL-SCNC: 98 MEQ/L (ref 95–107)
CHOLEST SERPL-MCNC: 112 MG/DL (ref 0–199)
CO2 SERPL-SCNC: 32 MEQ/L (ref 20–31)
CREAT SERPL-MCNC: 0.95 MG/DL (ref 0.7–1.2)
EOSINOPHIL # BLD: 0.5 K/UL (ref 0–0.7)
EOSINOPHIL NFR BLD: 5.7 %
ERYTHROCYTE [DISTWIDTH] IN BLOOD BY AUTOMATED COUNT: 14.5 % (ref 11.5–14.5)
GLOBULIN SER CALC-MCNC: 2.6 G/DL (ref 2.3–3.5)
GLUCOSE SERPL-MCNC: 157 MG/DL (ref 70–99)
HCT VFR BLD AUTO: 36.8 % (ref 42–52)
HDLC SERPL-MCNC: 39 MG/DL (ref 40–59)
HGB BLD-MCNC: 12.5 G/DL (ref 14–18)
LDLC SERPL CALC-MCNC: 55 MG/DL (ref 0–129)
LYMPHOCYTES # BLD: 1.6 K/UL (ref 1–4.8)
LYMPHOCYTES NFR BLD: 20 %
MCH RBC QN AUTO: 30.3 PG (ref 27–31.3)
MCHC RBC AUTO-ENTMCNC: 33.9 % (ref 33–37)
MCV RBC AUTO: 89.2 FL (ref 79–92.2)
MONOCYTES # BLD: 0.8 K/UL (ref 0.2–0.8)
MONOCYTES NFR BLD: 10.1 %
NEUTROPHILS # BLD: 5.1 K/UL (ref 1.4–6.5)
NEUTS SEG NFR BLD: 63.2 %
PLATELET # BLD AUTO: 196 K/UL (ref 130–400)
POTASSIUM SERPL-SCNC: 3.5 MEQ/L (ref 3.4–4.9)
PROT SERPL-MCNC: 6.1 G/DL (ref 6.3–8)
RBC # BLD AUTO: 4.13 M/UL (ref 4.7–6.1)
SODIUM SERPL-SCNC: 140 MEQ/L (ref 135–144)
TRIGL SERPL-MCNC: 90 MG/DL (ref 0–150)
TSH SERPL-MCNC: 1.97 UIU/ML (ref 0.44–3.86)
WBC # BLD AUTO: 8.1 K/UL (ref 4.8–10.8)

## 2023-05-08 ENCOUNTER — HOSPITAL ENCOUNTER (INPATIENT)
Age: 88
LOS: 3 days | Discharge: NURSING, REHAB OR LONG-TERM CARE | End: 2023-05-12
Attending: INTERNAL MEDICINE | Admitting: INTERNAL MEDICINE
Payer: MEDICARE

## 2023-05-08 ENCOUNTER — APPOINTMENT (OUTPATIENT)
Dept: GENERAL RADIOLOGY | Age: 88
End: 2023-05-08
Payer: MEDICARE

## 2023-05-08 ENCOUNTER — APPOINTMENT (OUTPATIENT)
Dept: CT IMAGING | Age: 88
End: 2023-05-08
Payer: MEDICARE

## 2023-05-08 DIAGNOSIS — A41.9 SEPTICEMIA (HCC): Primary | ICD-10-CM

## 2023-05-08 DIAGNOSIS — F03.90 DEMENTIA, UNSPECIFIED DEMENTIA SEVERITY, UNSPECIFIED DEMENTIA TYPE, UNSPECIFIED WHETHER BEHAVIORAL, PSYCHOTIC, OR MOOD DISTURBANCE OR ANXIETY (HCC): ICD-10-CM

## 2023-05-08 DIAGNOSIS — R09.02 HYPOXIA: ICD-10-CM

## 2023-05-08 DIAGNOSIS — K80.00 GALLSTONES AND INFLAMMATION OF GALLBLADDER WITHOUT OBSTRUCTION: ICD-10-CM

## 2023-05-08 LAB
ALBUMIN SERPL-MCNC: 3.7 G/DL (ref 3.5–4.6)
ALP SERPL-CCNC: 78 U/L (ref 35–104)
ALT SERPL-CCNC: 12 U/L (ref 0–41)
ANION GAP SERPL CALCULATED.3IONS-SCNC: 13 MEQ/L (ref 9–15)
AST SERPL-CCNC: 23 U/L (ref 0–40)
B PARAP IS1001 DNA NPH QL NAA+NON-PROBE: NOT DETECTED
B PERT.PT PRMT NPH QL NAA+NON-PROBE: NOT DETECTED
BASE EXCESS ARTERIAL: 7 (ref -3–3)
BASOPHILS # BLD: 0 K/UL (ref 0–0.2)
BASOPHILS NFR BLD: 0 %
BILIRUB SERPL-MCNC: 0.8 MG/DL (ref 0.2–0.7)
BILIRUB UR QL STRIP: NEGATIVE
BNP BLD-MCNC: 1710 PG/ML
BUN SERPL-MCNC: 14 MG/DL (ref 8–23)
C PNEUM DNA NPH QL NAA+NON-PROBE: NOT DETECTED
CALCIUM IONIZED: 1.12 MMOL/L (ref 1.12–1.32)
CALCIUM SERPL-MCNC: 8.8 MG/DL (ref 8.5–9.9)
CHLORIDE SERPL-SCNC: 92 MEQ/L (ref 95–107)
CLARITY UR: CLEAR
CO2 SERPL-SCNC: 26 MEQ/L (ref 20–31)
COLOR UR: YELLOW
CREAT SERPL-MCNC: 1.12 MG/DL (ref 0.7–1.2)
DACRYOCYTES BLD QL SMEAR: ABNORMAL
EOSINOPHIL # BLD: 0 K/UL (ref 0–0.7)
EOSINOPHIL NFR BLD: 0 %
ERYTHROCYTE [DISTWIDTH] IN BLOOD BY AUTOMATED COUNT: 14.5 % (ref 11.5–14.5)
FLUAV RNA NPH QL NAA+NON-PROBE: NOT DETECTED
FLUBV RNA NPH QL NAA+NON-PROBE: NOT DETECTED
GLOBULIN SER CALC-MCNC: 3.3 G/DL (ref 2.3–3.5)
GLUCOSE BLD-MCNC: 203 MG/DL (ref 70–99)
GLUCOSE SERPL-MCNC: 210 MG/DL (ref 70–99)
GLUCOSE UR STRIP-MCNC: NEGATIVE MG/DL
HADV DNA NPH QL NAA+NON-PROBE: NOT DETECTED
HCO3 ARTERIAL: 29.7 MMOL/L (ref 21–29)
HCOV 229E RNA NPH QL NAA+NON-PROBE: NOT DETECTED
HCOV HKU1 RNA NPH QL NAA+NON-PROBE: NOT DETECTED
HCOV NL63 RNA NPH QL NAA+NON-PROBE: NOT DETECTED
HCOV OC43 RNA NPH QL NAA+NON-PROBE: NOT DETECTED
HCT VFR BLD AUTO: 39.3 % (ref 42–52)
HCT VFR BLD AUTO: 40 % (ref 41–53)
HGB BLD CALC-MCNC: 13.7 GM/DL (ref 13.5–17.5)
HGB BLD-MCNC: 13 G/DL (ref 14–18)
HGB UR QL STRIP: NEGATIVE
HMPV RNA NPH QL NAA+NON-PROBE: NOT DETECTED
HPIV1 RNA NPH QL NAA+NON-PROBE: NOT DETECTED
HPIV2 RNA NPH QL NAA+NON-PROBE: NOT DETECTED
HPIV3 RNA NPH QL NAA+NON-PROBE: NOT DETECTED
HPIV4 RNA NPH QL NAA+NON-PROBE: NOT DETECTED
KETONES UR STRIP-MCNC: NEGATIVE MG/DL
LACTATE: 2.22 MMOL/L (ref 0.4–2)
LACTIC ACID, SEPSIS: 3 MMOL/L (ref 0.5–1.9)
LEUKOCYTE ESTERASE UR QL STRIP: NEGATIVE
LYMPHOCYTES # BLD: 1.9 K/UL (ref 1–4.8)
LYMPHOCYTES NFR BLD: 8 %
M PNEUMO DNA NPH QL NAA+NON-PROBE: NOT DETECTED
MAGNESIUM SERPL-MCNC: 2.1 MG/DL (ref 1.7–2.4)
MCH RBC QN AUTO: 30 PG (ref 27–31.3)
MCHC RBC AUTO-ENTMCNC: 33 % (ref 33–37)
MCV RBC AUTO: 91 FL (ref 79–92.2)
MONOCYTES # BLD: 1.2 K/UL (ref 0.2–0.8)
MONOCYTES NFR BLD: 5 %
NEUTROPHILS # BLD: 20.5 K/UL (ref 1.4–6.5)
NEUTS SEG NFR BLD: 87 %
NITRITE UR QL STRIP: NEGATIVE
O2 SAT, ARTERIAL: 94 % (ref 93–100)
OVALOCYTES BLD QL SMEAR: ABNORMAL
PCO2 ARTERIAL: 38 MM HG (ref 35–45)
PERFORMED ON: ABNORMAL
PH ARTERIAL: 7.5 (ref 7.35–7.45)
PH UR STRIP: 7.5 [PH] (ref 5–9)
PLATELET # BLD AUTO: 179 K/UL (ref 130–400)
PLATELET BLD QL SMEAR: ADEQUATE
PO2 ARTERIAL: 63 MM HG (ref 75–108)
POC CHLORIDE: 95 MEQ/L (ref 99–110)
POC CREATININE: 1 MG/DL (ref 0.8–1.3)
POC SAMPLE TYPE: ABNORMAL
POLYCHROMASIA BLD QL SMEAR: ABNORMAL
POTASSIUM SERPL-SCNC: 3.5 MEQ/L (ref 3.5–5.1)
POTASSIUM SERPL-SCNC: 3.7 MEQ/L (ref 3.4–4.9)
PROT SERPL-MCNC: 7 G/DL (ref 6.3–8)
PROT UR STRIP-MCNC: NEGATIVE MG/DL
RBC # BLD AUTO: 4.32 M/UL (ref 4.7–6.1)
RSV RNA NPH QL NAA+NON-PROBE: NOT DETECTED
RV+EV RNA NPH QL NAA+NON-PROBE: NOT DETECTED
SARS-COV-2 RNA NPH QL NAA+NON-PROBE: NOT DETECTED
SODIUM BLD-SCNC: 134 MEQ/L (ref 136–145)
SODIUM SERPL-SCNC: 131 MEQ/L (ref 135–144)
SP GR UR STRIP: 1.01 (ref 1–1.03)
TCO2 ARTERIAL: 31 MMOL/L (ref 21–32)
TROPONIN T SERPL-MCNC: <0.01 NG/ML (ref 0–0.01)
URINE REFLEX TO CULTURE: NORMAL
UROBILINOGEN UR STRIP-ACNC: 1 E.U./DL
WBC # BLD AUTO: 23.6 K/UL (ref 4.8–10.8)

## 2023-05-08 PROCEDURE — 36415 COLL VENOUS BLD VENIPUNCTURE: CPT

## 2023-05-08 PROCEDURE — 84484 ASSAY OF TROPONIN QUANT: CPT

## 2023-05-08 PROCEDURE — 84145 PROCALCITONIN (PCT): CPT

## 2023-05-08 PROCEDURE — 82435 ASSAY OF BLOOD CHLORIDE: CPT

## 2023-05-08 PROCEDURE — 99285 EMERGENCY DEPT VISIT HI MDM: CPT

## 2023-05-08 PROCEDURE — 2580000003 HC RX 258

## 2023-05-08 PROCEDURE — 96365 THER/PROPH/DIAG IV INF INIT: CPT

## 2023-05-08 PROCEDURE — 82565 ASSAY OF CREATININE: CPT

## 2023-05-08 PROCEDURE — 74177 CT ABD & PELVIS W/CONTRAST: CPT

## 2023-05-08 PROCEDURE — 71260 CT THORAX DX C+: CPT

## 2023-05-08 PROCEDURE — 82803 BLOOD GASES ANY COMBINATION: CPT

## 2023-05-08 PROCEDURE — 81003 URINALYSIS AUTO W/O SCOPE: CPT

## 2023-05-08 PROCEDURE — 0202U NFCT DS 22 TRGT SARS-COV-2: CPT

## 2023-05-08 PROCEDURE — 93005 ELECTROCARDIOGRAM TRACING: CPT

## 2023-05-08 PROCEDURE — 84295 ASSAY OF SERUM SODIUM: CPT

## 2023-05-08 PROCEDURE — 83605 ASSAY OF LACTIC ACID: CPT

## 2023-05-08 PROCEDURE — 87150 DNA/RNA AMPLIFIED PROBE: CPT

## 2023-05-08 PROCEDURE — 85014 HEMATOCRIT: CPT

## 2023-05-08 PROCEDURE — 6360000002 HC RX W HCPCS

## 2023-05-08 PROCEDURE — 83735 ASSAY OF MAGNESIUM: CPT

## 2023-05-08 PROCEDURE — 87040 BLOOD CULTURE FOR BACTERIA: CPT

## 2023-05-08 PROCEDURE — 83880 ASSAY OF NATRIURETIC PEPTIDE: CPT

## 2023-05-08 PROCEDURE — 6370000000 HC RX 637 (ALT 250 FOR IP)

## 2023-05-08 PROCEDURE — 82330 ASSAY OF CALCIUM: CPT

## 2023-05-08 PROCEDURE — 96367 TX/PROPH/DG ADDL SEQ IV INF: CPT

## 2023-05-08 PROCEDURE — 85025 COMPLETE CBC W/AUTO DIFF WBC: CPT

## 2023-05-08 PROCEDURE — 96366 THER/PROPH/DIAG IV INF ADDON: CPT

## 2023-05-08 PROCEDURE — 80053 COMPREHEN METABOLIC PANEL: CPT

## 2023-05-08 PROCEDURE — 84132 ASSAY OF SERUM POTASSIUM: CPT

## 2023-05-08 PROCEDURE — 71045 X-RAY EXAM CHEST 1 VIEW: CPT

## 2023-05-08 PROCEDURE — 6360000004 HC RX CONTRAST MEDICATION

## 2023-05-08 PROCEDURE — 36600 WITHDRAWAL OF ARTERIAL BLOOD: CPT

## 2023-05-08 RX ORDER — 0.9 % SODIUM CHLORIDE 0.9 %
500 INTRAVENOUS SOLUTION INTRAVENOUS ONCE
Status: COMPLETED | OUTPATIENT
Start: 2023-05-08 | End: 2023-05-09

## 2023-05-08 RX ORDER — ACETAMINOPHEN 500 MG
1000 TABLET ORAL ONCE
Status: COMPLETED | OUTPATIENT
Start: 2023-05-08 | End: 2023-05-08

## 2023-05-08 RX ADMIN — IOPAMIDOL 50 ML: 612 INJECTION, SOLUTION INTRAVENOUS at 23:33

## 2023-05-08 RX ADMIN — SODIUM CHLORIDE 500 ML: 9 INJECTION, SOLUTION INTRAVENOUS at 22:18

## 2023-05-08 RX ADMIN — ACETAMINOPHEN 1000 MG: 500 TABLET ORAL at 22:27

## 2023-05-08 RX ADMIN — AZITHROMYCIN DIHYDRATE 500 MG: 500 INJECTION, POWDER, LYOPHILIZED, FOR SOLUTION INTRAVENOUS at 23:00

## 2023-05-08 RX ADMIN — CEFTRIAXONE SODIUM 1000 MG: 1 INJECTION, POWDER, FOR SOLUTION INTRAMUSCULAR; INTRAVENOUS at 22:27

## 2023-05-08 ASSESSMENT — PAIN - FUNCTIONAL ASSESSMENT: PAIN_FUNCTIONAL_ASSESSMENT: NONE - DENIES PAIN

## 2023-05-09 ENCOUNTER — APPOINTMENT (OUTPATIENT)
Dept: NUCLEAR MEDICINE | Age: 88
End: 2023-05-09
Payer: MEDICARE

## 2023-05-09 PROBLEM — A41.9 SEPSIS (HCC): Status: ACTIVE | Noted: 2023-05-09

## 2023-05-09 LAB
A BAUMANNII DNA BLD POS QL NAA+NON-PROBE: NOT DETECTED
ANION GAP SERPL CALCULATED.3IONS-SCNC: 14 MEQ/L (ref 9–15)
BACTERIA BLD CULT ORG #2: ABNORMAL
BASOPHILS # BLD: 0.1 K/UL (ref 0–0.2)
BASOPHILS NFR BLD: 0.3 %
BUN SERPL-MCNC: 14 MG/DL (ref 8–23)
C ALBICANS DNA BLD POS QL NAA+NON-PROBE: NOT DETECTED
C AURIS DNA BLD POS QL NAA+PROBE: NOT DETECTED
C GLABRATA DNA BLD POS QL NAA+NON-PROBE: NOT DETECTED
C KRUSEI DNA BLD POS QL NAA+NON-PROBE: NOT DETECTED
C PARAP DNA BLD POS QL NAA+NON-PROBE: NOT DETECTED
C TROPICLS DNA BLD POS QL NAA+NON-PROBE: NOT DETECTED
CALCIUM SERPL-MCNC: 8.3 MG/DL (ref 8.5–9.9)
CHLORIDE SERPL-SCNC: 101 MEQ/L (ref 95–107)
CO2 SERPL-SCNC: 27 MEQ/L (ref 20–31)
CREAT SERPL-MCNC: 1.03 MG/DL (ref 0.7–1.2)
CRP SERPL HS-MCNC: 128.6 MG/L (ref 0–5)
CRYPTOCOCCUS NEOFORMANS/GATTII BY PCR: NOT DETECTED
E CLOAC COMP DNA BLD POS NAA+NON-PROBE: NOT DETECTED
E COLI DNA BLD POS QL NAA+NON-PROBE: NOT DETECTED
E FAECALIS DNA BLD POS QL NAA+PROBE: NOT DETECTED
E FAECIUM DNA BLD POS QL NAA+PROBE: NOT DETECTED
ENTEROBACT DNA BLD POS QL NAA+NON-PROBE: NOT DETECTED
ENTEROCOC DNA BLD POS QL NAA+NON-PROBE: NOT DETECTED
EOSINOPHIL # BLD: 0 K/UL (ref 0–0.7)
EOSINOPHIL NFR BLD: 0.1 %
ERYTHROCYTE [DISTWIDTH] IN BLOOD BY AUTOMATED COUNT: 15 % (ref 11.5–14.5)
GLUCOSE SERPL-MCNC: 132 MG/DL (ref 70–99)
GN BLD CULTURE PNL BLD POS NAA+PROBE: NOT DETECTED
GP B STREP DNA BLD POS QL NAA+NON-PROBE: NOT DETECTED
HCT VFR BLD AUTO: 34.2 % (ref 42–52)
HGB BLD-MCNC: 11.6 G/DL (ref 14–18)
K OXYTOCA DNA BLD POS QL NAA+NON-PROBE: NOT DETECTED
K PNEUMON DNA SPEC QL NAA+PROBE: NOT DETECTED
K. AEROGENES DNA SPEC QL NAA+PROBE: NOT DETECTED
L MONOCYTOG DNA BLD POS QL NAA+NON-PROBE: NOT DETECTED
LACTATE BLDV-SCNC: 1.3 MMOL/L (ref 0.5–2.2)
LACTIC ACID, SEPSIS: 1.3 MMOL/L (ref 0.5–1.9)
LACTIC ACID, SEPSIS: 2 MMOL/L (ref 0.5–1.9)
LACTIC ACID, SEPSIS: 2.3 MMOL/L (ref 0.5–1.9)
LYMPHOCYTES # BLD: 1.5 K/UL (ref 1–4.8)
LYMPHOCYTES NFR BLD: 8.1 %
MCH RBC QN AUTO: 30.1 PG (ref 27–31.3)
MCHC RBC AUTO-ENTMCNC: 33.8 % (ref 33–37)
MCV RBC AUTO: 89.1 FL (ref 79–92.2)
MONOCYTES # BLD: 0.8 K/UL (ref 0.2–0.8)
MONOCYTES NFR BLD: 4.6 %
N MEN DNA BLD POS QL NAA+NON-PROBE: NOT DETECTED
NEUTROPHILS # BLD: 15.8 K/UL (ref 1.4–6.5)
NEUTS SEG NFR BLD: 86.9 %
P AERUGINOSA DNA BLD POS NAA+NON-PROBE: NOT DETECTED
PLATELET # BLD AUTO: 165 K/UL (ref 130–400)
POTASSIUM SERPL-SCNC: 3.7 MEQ/L (ref 3.4–4.9)
PROCALCITONIN SERPL IA-MCNC: 0.5 NG/ML (ref 0–0.15)
PROCALCITONIN SERPL IA-MCNC: 0.55 NG/ML (ref 0–0.15)
PROTEUS SP DNA BLD POS QL NAA+NON-PROBE: NOT DETECTED
RBC # BLD AUTO: 3.84 M/UL (ref 4.7–6.1)
S AUREUS DNA BLD POS QL NAA+NON-PROBE: NOT DETECTED
S AUREUS+CONS DNA BLD POS NAA+NON-PROBE: NOT DETECTED
S EPIDERMIDIS DNA BLD POS QL NAA+PROBE: NOT DETECTED
S LUGDUNENSIS DNA BLD POS QL NAA+PROBE: NOT DETECTED
S MALTOPH DNA BLD POS QL NAA+PROBE: NOT DETECTED
S MARCESCENS DNA BLD POS NAA+NON-PROBE: NOT DETECTED
S PNEUM DNA BLD POS QL NAA+NON-PROBE: NOT DETECTED
S PYO DNA BLD POS QL NAA+NON-PROBE: NOT DETECTED
SALMONELLA DNA BLD POS QL NAA+PROBE: NOT DETECTED
SODIUM SERPL-SCNC: 142 MEQ/L (ref 135–144)
STREPTOCOCCUS DNA BLD POS NAA+NON-PROBE: NOT DETECTED
WBC # BLD AUTO: 18.1 K/UL (ref 4.8–10.8)

## 2023-05-09 PROCEDURE — 6360000002 HC RX W HCPCS

## 2023-05-09 PROCEDURE — 83605 ASSAY OF LACTIC ACID: CPT

## 2023-05-09 PROCEDURE — 93005 ELECTROCARDIOGRAM TRACING: CPT | Performed by: INTERNAL MEDICINE

## 2023-05-09 PROCEDURE — 6360000002 HC RX W HCPCS: Performed by: INTERNAL MEDICINE

## 2023-05-09 PROCEDURE — 3430000000 HC RX DIAGNOSTIC RADIOPHARMACEUTICAL: Performed by: SURGERY

## 2023-05-09 PROCEDURE — 36415 COLL VENOUS BLD VENIPUNCTURE: CPT

## 2023-05-09 PROCEDURE — 99221 1ST HOSP IP/OBS SF/LOW 40: CPT | Performed by: SURGERY

## 2023-05-09 PROCEDURE — 51798 US URINE CAPACITY MEASURE: CPT

## 2023-05-09 PROCEDURE — 94664 DEMO&/EVAL PT USE INHALER: CPT

## 2023-05-09 PROCEDURE — A9537 TC99M MEBROFENIN: HCPCS | Performed by: SURGERY

## 2023-05-09 PROCEDURE — 2709999900 NM HEPATOBILIARY SCAN W PHARMACOLOGICAL INTERVENTION

## 2023-05-09 PROCEDURE — 87449 NOS EACH ORGANISM AG IA: CPT

## 2023-05-09 PROCEDURE — 6370000000 HC RX 637 (ALT 250 FOR IP): Performed by: INTERNAL MEDICINE

## 2023-05-09 PROCEDURE — 84145 PROCALCITONIN (PCT): CPT

## 2023-05-09 PROCEDURE — 2580000003 HC RX 258: Performed by: INTERNAL MEDICINE

## 2023-05-09 PROCEDURE — 85025 COMPLETE CBC W/AUTO DIFF WBC: CPT

## 2023-05-09 PROCEDURE — 80048 BASIC METABOLIC PNL TOTAL CA: CPT

## 2023-05-09 PROCEDURE — 87899 AGENT NOS ASSAY W/OPTIC: CPT

## 2023-05-09 PROCEDURE — 86140 C-REACTIVE PROTEIN: CPT

## 2023-05-09 PROCEDURE — 1210000000 HC MED SURG R&B

## 2023-05-09 PROCEDURE — 2580000003 HC RX 258

## 2023-05-09 PROCEDURE — 2580000003 HC RX 258: Performed by: SURGERY

## 2023-05-09 RX ORDER — ONDANSETRON 2 MG/ML
4 INJECTION INTRAMUSCULAR; INTRAVENOUS EVERY 6 HOURS PRN
Status: DISCONTINUED | OUTPATIENT
Start: 2023-05-09 | End: 2023-05-12 | Stop reason: HOSPADM

## 2023-05-09 RX ORDER — SODIUM CHLORIDE 0.9 % (FLUSH) 0.9 %
10 SYRINGE (ML) INJECTION PRN
Status: DISCONTINUED | OUTPATIENT
Start: 2023-05-09 | End: 2023-05-12 | Stop reason: HOSPADM

## 2023-05-09 RX ORDER — SODIUM CHLORIDE, SODIUM LACTATE, POTASSIUM CHLORIDE, CALCIUM CHLORIDE 600; 310; 30; 20 MG/100ML; MG/100ML; MG/100ML; MG/100ML
INJECTION, SOLUTION INTRAVENOUS ONCE
Status: COMPLETED | OUTPATIENT
Start: 2023-05-09 | End: 2023-05-09

## 2023-05-09 RX ORDER — IPRATROPIUM BROMIDE AND ALBUTEROL SULFATE 2.5; .5 MG/3ML; MG/3ML
1 SOLUTION RESPIRATORY (INHALATION) EVERY 4 HOURS PRN
Status: DISCONTINUED | OUTPATIENT
Start: 2023-05-09 | End: 2023-05-12 | Stop reason: HOSPADM

## 2023-05-09 RX ORDER — ENOXAPARIN SODIUM 100 MG/ML
40 INJECTION SUBCUTANEOUS DAILY
Status: DISCONTINUED | OUTPATIENT
Start: 2023-05-09 | End: 2023-05-12 | Stop reason: HOSPADM

## 2023-05-09 RX ORDER — ACETAMINOPHEN 325 MG/1
650 TABLET ORAL EVERY 4 HOURS PRN
Status: DISCONTINUED | OUTPATIENT
Start: 2023-05-09 | End: 2023-05-12 | Stop reason: HOSPADM

## 2023-05-09 RX ORDER — GUAIFENESIN 600 MG/1
600 TABLET, EXTENDED RELEASE ORAL 2 TIMES DAILY
Status: DISCONTINUED | OUTPATIENT
Start: 2023-05-09 | End: 2023-05-12 | Stop reason: HOSPADM

## 2023-05-09 RX ADMIN — GUAIFENESIN 600 MG: 600 TABLET, EXTENDED RELEASE ORAL at 20:23

## 2023-05-09 RX ADMIN — Medication 7.7 MILLICURIE: at 12:50

## 2023-05-09 RX ADMIN — ENOXAPARIN SODIUM 40 MG: 100 INJECTION SUBCUTANEOUS at 18:28

## 2023-05-09 RX ADMIN — PIPERACILLIN AND TAZOBACTAM 3375 MG: 3; .375 INJECTION, POWDER, LYOPHILIZED, FOR SOLUTION INTRAVENOUS at 09:24

## 2023-05-09 RX ADMIN — PIPERACILLIN AND TAZOBACTAM 3375 MG: 3; .375 INJECTION, POWDER, LYOPHILIZED, FOR SOLUTION INTRAVENOUS at 18:28

## 2023-05-09 RX ADMIN — SODIUM CHLORIDE, POTASSIUM CHLORIDE, SODIUM LACTATE AND CALCIUM CHLORIDE: 600; 310; 30; 20 INJECTION, SOLUTION INTRAVENOUS at 03:37

## 2023-05-09 RX ADMIN — GUAIFENESIN 600 MG: 600 TABLET, EXTENDED RELEASE ORAL at 09:22

## 2023-05-09 RX ADMIN — SODIUM CHLORIDE, PRESERVATIVE FREE 10 ML: 5 INJECTION INTRAVENOUS at 12:51

## 2023-05-09 RX ADMIN — PIPERACILLIN AND TAZOBACTAM 3375 MG: 3; .375 INJECTION, POWDER, LYOPHILIZED, FOR SOLUTION INTRAVENOUS at 01:44

## 2023-05-09 RX ADMIN — SODIUM CHLORIDE, PRESERVATIVE FREE 10 ML: 5 INJECTION INTRAVENOUS at 20:23

## 2023-05-09 NOTE — ED NOTES
SANIYA SIM AWARE OF PT'S HIGH RESP. RATE. 0 NEW ORDERS. PT RESTING IN BED, SKIN W/D/PINK, OCCASIONAL COUGH NOTED, DENIES PAIN.      Milla Calvo RN  05/08/23 [id number]

## 2023-05-09 NOTE — H&P
Carotid stenosis     Dementia (HCC)     Diastolic CHF, acute on chronic (HCC)     mitral stenosis    FH: carotid endarterectomy     Hiatal hernia     Hypoxia     Loss of coordination     Muscle weakness     Pharyngeal dysphagia     Pneumonia        Past Surgical History:  History reviewed. No pertinent surgical history. Medications Prior to Admission:    Prior to Admission medications    Medication Sig Start Date End Date Taking? Authorizing Provider   pravastatin (PRAVACHOL) 80 MG tablet Take 1 tablet by mouth every evening 4/7/23 4/6/24  Jocelyn Diaz MD   aspirin 81 MG chewable tablet Take 1 tablet by mouth daily  Patient taking differently: Take 81 mg by mouth daily Indications: Preventative Treatment 1/24/23   Huong Prado MD   isosorbide mononitrate (IMDUR) 30 MG extended release tablet Take 1 tablet by mouth daily  Patient taking differently: Take 30 mg by mouth daily Indications: High Blood Pressure Disorder 1/24/23   Huong Prado MD   ipratropium-albuterol (DUONEB) 0.5-2.5 (3) MG/3ML SOLN nebulizer solution Inhale 3 mLs into the lungs every 4 hours as needed for Shortness of Breath 1/23/23   Huong Prado MD   metoprolol succinate (TOPROL XL) 25 MG extended release tablet Take 1 tablet by mouth daily  Patient taking differently: Take 25 mg by mouth daily Indications: High Blood Pressure Disorder 1/23/23   AZEB Mcdaniel CNP   torsemide (DEMADEX) 20 MG tablet Take 1 tablet by mouth daily  Patient taking differently: Take 20 mg by mouth daily Indications: Edema 1/23/23   AZEB Mcdaniel CNP       Allergies:  Patient has no known allergies. Social History:   TOBACCO:   reports that he has never smoked. He has never used smokeless tobacco.  ETOH:   reports no history of alcohol use. OCCUPATION:  \Retired  at [employer]    Family History:   History reviewed. No pertinent family history.     REVIEW OF SYSTEMS:  Unable to obtain ROS    Physical Exam:    Vitals: BP (!) 117/59

## 2023-05-09 NOTE — CARE COORDINATION
Case Management Assessment  Initial Evaluation    Date/Time of Evaluation: 5/9/2023 11:35 AM  Assessment Completed by: Maya Rondon RN    If patient is discharged prior to next notation, then this note serves as note for discharge by case management. Patient Name: Edwin Mcnair                   YOB: 1932  Diagnosis: Septicemia ([facility] [A41.9]  Hypoxia [R09.02]  Gallstones and inflammation of gallbladder without obstruction [K80.00]  Sepsis ([facility] [A41.9]  Dementia, unspecified dementia severity, unspecified dementia type, unspecified whether behavioral, psychotic, or mood disturbance or anxiety ([facility] [F03.90]                   Date / Time: 5/8/2023  9:45 PM    Patient Admission Status: Inpatient   Readmission Risk (Low < 19, Mod (19-27), High > 27): Readmission Risk Score: 15    Current PCP: Rosendo Hadley MD  PCP verified by CM? Yes    Chart Reviewed: Yes      History Provided by: Child/Family, Medical Record ([place] [id number])  Patient Orientation: Unable to Assess    Patient Cognition: Dementia / Early Alzheimer's    Hospitalization in the last 30 days (Readmission):  No    If yes, Readmission Assessment in CM Navigator will be completed.     Advance Directives:      Code Status: Full Code   Patient's Primary Decision Maker is: Named in [address] [place] [address] ([facility])    Primary Decision MakerLyndkatarina Martin  Child - [phone]    Secondary Decision Maker: [address] [place] [address] [phone]  -SPOUSE HAS EARLY DEMENTIA    Discharge Planning:    Patient lives with: Spouse AT [facility] Type of Home: ASSISTED LIVING  Primary Care Giver:  ([facility] )  Patient Support Systems include: Spouse/Significant Other, Family Members, Children, [facility] Care Staff ([facility]   Current services prior to admission: Durable Medical Equipment            Current DME: Linnea Mendoza            Type of Home Care services: HHA, CURRENT WITH PT/OT AT [facility](CONFIRMED BY

## 2023-05-09 NOTE — ACP (ADVANCE CARE PLANNING)
Advance Care Planning   Healthcare Decision Maker:    Primary Decision Maker: Jace Forrest Child - [phone]    Secondary Decision Maker: Robert Cyr - Spouse - [phone]    Keishakatarina Manny 42, WIFE HAS EARLY DEMENTIA

## 2023-05-09 NOTE — ED PROVIDER NOTES
by mouth daily    PRAVASTATIN (PRAVACHOL) 80 MG TABLET    Take 1 tablet by mouth every evening    TORSEMIDE (DEMADEX) 20 MG TABLET    Take 1 tablet by mouth daily       ALLERGIES     Patient has no known allergies. FAMILY HISTORY     History reviewed. No pertinent family history. SOCIAL HISTORY       Social History     Socioeconomic History    Marital status:      Spouse name: None    Number of children: None    Years of education: None    Highest education level: None   Tobacco Use    Smoking status: Never    Smokeless tobacco: Never   Vaping Use    Vaping Use: Never used   Substance and Sexual Activity    Alcohol use: Never    Drug use: Never         PHYSICAL EXAM        ED Triage Vitals [05/08/23 2147]   BP Temp Temp Source Pulse Respirations SpO2 Height Weight - Scale   (!) 132/91 100.3 °F (37.9 °C) Oral (!) 111 (!) 35 100 % 6' (1.829 m) 190 lb (86.2 kg)       Physical Exam  Constitutional:       General: He is not in acute distress. Appearance: Normal appearance. He is ill-appearing (chronically). He is not toxic-appearing or diaphoretic. HENT:      Head: Normocephalic and atraumatic. Right Ear: External ear normal.      Left Ear: External ear normal.      Nose: Nose normal.      Mouth/Throat:      Mouth: Mucous membranes are moist.      Pharynx: Oropharynx is clear. No oropharyngeal exudate or posterior oropharyngeal erythema. Eyes:      Extraocular Movements: Extraocular movements intact. Conjunctiva/sclera: Conjunctivae normal.      Pupils: Pupils are equal, round, and reactive to light. Neck:      Comments: No meningismus  Cardiovascular:      Rate and Rhythm: Regular rhythm. Tachycardia present. Pulses:           Dorsalis pedis pulses are 2+ on the right side and 2+ on the left side. Posterior tibial pulses are 2+ on the right side and 2+ on the left side. Pulmonary:      Effort: Pulmonary effort is normal. No respiratory distress.       Breath sounds:

## 2023-05-09 NOTE — ED TRIAGE NOTES
Pt from [facility] c/o gen. Weakness/illness x 1 week, today fever. Pt coughing on arrival, skin w/d/pink, resp. In 29's.  Pt a&ox1, speech clear, follows commands, hx. dementia

## 2023-05-10 LAB
ALBUMIN SERPL-MCNC: 3.1 G/DL (ref 3.5–4.6)
ALP SERPL-CCNC: 62 U/L (ref 35–104)
ALT SERPL-CCNC: 10 U/L (ref 0–41)
ANION GAP SERPL CALCULATED.3IONS-SCNC: 12 MEQ/L (ref 9–15)
APTT PPP: 39.2 SEC (ref 24.4–36.8)
AST SERPL-CCNC: 16 U/L (ref 0–40)
B PARAP IS1001 DNA NPH QL NAA+NON-PROBE: NOT DETECTED
B PERT.PT PRMT NPH QL NAA+NON-PROBE: NOT DETECTED
BASOPHILS # BLD: 0 K/UL (ref 0–0.2)
BASOPHILS NFR BLD: 0.3 %
BILIRUB DIRECT SERPL-MCNC: <0.2 MG/DL (ref 0–0.4)
BILIRUB INDIRECT SERPL-MCNC: ABNORMAL MG/DL (ref 0–0.6)
BILIRUB SERPL-MCNC: 0.6 MG/DL (ref 0.2–0.7)
BUN SERPL-MCNC: 13 MG/DL (ref 8–23)
C PNEUM DNA NPH QL NAA+NON-PROBE: NOT DETECTED
CALCIUM SERPL-MCNC: 8.4 MG/DL (ref 8.5–9.9)
CHLORIDE SERPL-SCNC: 99 MEQ/L (ref 95–107)
CO2 SERPL-SCNC: 27 MEQ/L (ref 20–31)
CREAT SERPL-MCNC: 0.86 MG/DL (ref 0.7–1.2)
EKG ATRIAL RATE: 106 BPM
EKG ATRIAL RATE: 115 BPM
EKG P AXIS: 18 DEGREES
EKG P AXIS: 26 DEGREES
EKG P-R INTERVAL: 186 MS
EKG P-R INTERVAL: 188 MS
EKG Q-T INTERVAL: 318 MS
EKG Q-T INTERVAL: 334 MS
EKG QRS DURATION: 82 MS
EKG QRS DURATION: 84 MS
EKG QTC CALCULATION (BAZETT): 439 MS
EKG QTC CALCULATION (BAZETT): 443 MS
EKG R AXIS: 3 DEGREES
EKG R AXIS: 4 DEGREES
EKG T AXIS: 25 DEGREES
EKG T AXIS: 37 DEGREES
EKG VENTRICULAR RATE: 106 BPM
EKG VENTRICULAR RATE: 115 BPM
EOSINOPHIL # BLD: 0.2 K/UL (ref 0–0.7)
EOSINOPHIL NFR BLD: 1.3 %
ERYTHROCYTE [DISTWIDTH] IN BLOOD BY AUTOMATED COUNT: 14.7 % (ref 11.5–14.5)
FLUAV RNA NPH QL NAA+NON-PROBE: NOT DETECTED
FLUBV RNA NPH QL NAA+NON-PROBE: NOT DETECTED
GLUCOSE SERPL-MCNC: 139 MG/DL (ref 70–99)
HADV DNA NPH QL NAA+NON-PROBE: NOT DETECTED
HCOV 229E RNA NPH QL NAA+NON-PROBE: NOT DETECTED
HCOV HKU1 RNA NPH QL NAA+NON-PROBE: NOT DETECTED
HCOV NL63 RNA NPH QL NAA+NON-PROBE: NOT DETECTED
HCOV OC43 RNA NPH QL NAA+NON-PROBE: NOT DETECTED
HCT VFR BLD AUTO: 34.6 % (ref 42–52)
HGB BLD-MCNC: 11.7 G/DL (ref 14–18)
HMPV RNA NPH QL NAA+NON-PROBE: NOT DETECTED
HPIV1 RNA NPH QL NAA+NON-PROBE: NOT DETECTED
HPIV2 RNA NPH QL NAA+NON-PROBE: NOT DETECTED
HPIV3 RNA NPH QL NAA+NON-PROBE: NOT DETECTED
HPIV4 RNA NPH QL NAA+NON-PROBE: NOT DETECTED
INR PPP: 1.2
LYMPHOCYTES # BLD: 1.3 K/UL (ref 1–4.8)
LYMPHOCYTES NFR BLD: 10 %
M PNEUMO DNA NPH QL NAA+NON-PROBE: NOT DETECTED
MAGNESIUM SERPL-MCNC: 2.2 MG/DL (ref 1.7–2.4)
MCH RBC QN AUTO: 30.4 PG (ref 27–31.3)
MCHC RBC AUTO-ENTMCNC: 34 % (ref 33–37)
MCV RBC AUTO: 89.4 FL (ref 79–92.2)
MONOCYTES # BLD: 0.9 K/UL (ref 0.2–0.8)
MONOCYTES NFR BLD: 6.9 %
NEUTROPHILS # BLD: 10.8 K/UL (ref 1.4–6.5)
NEUTS SEG NFR BLD: 81.5 %
PLATELET # BLD AUTO: 151 K/UL (ref 130–400)
POTASSIUM SERPL-SCNC: 3.4 MEQ/L (ref 3.4–4.9)
PROCALCITONIN SERPL IA-MCNC: 0.36 NG/ML (ref 0–0.15)
PROT SERPL-MCNC: 5.7 G/DL (ref 6.3–8)
PROTHROMBIN TIME: 15 SEC (ref 12.3–14.9)
RBC # BLD AUTO: 3.87 M/UL (ref 4.7–6.1)
RSV RNA NPH QL NAA+NON-PROBE: NOT DETECTED
RV+EV RNA NPH QL NAA+NON-PROBE: NOT DETECTED
S PNEUM AG UR QL: NEGATIVE
SARS-COV-2 RNA NPH QL NAA+NON-PROBE: NOT DETECTED
SODIUM SERPL-SCNC: 138 MEQ/L (ref 135–144)
WBC # BLD AUTO: 13.3 K/UL (ref 4.8–10.8)

## 2023-05-10 PROCEDURE — 93010 ELECTROCARDIOGRAM REPORT: CPT | Performed by: INTERNAL MEDICINE

## 2023-05-10 PROCEDURE — 85025 COMPLETE CBC W/AUTO DIFF WBC: CPT

## 2023-05-10 PROCEDURE — 2700000000 HC OXYGEN THERAPY PER DAY

## 2023-05-10 PROCEDURE — 85730 THROMBOPLASTIN TIME PARTIAL: CPT

## 2023-05-10 PROCEDURE — 86140 C-REACTIVE PROTEIN: CPT

## 2023-05-10 PROCEDURE — 80076 HEPATIC FUNCTION PANEL: CPT

## 2023-05-10 PROCEDURE — 97166 OT EVAL MOD COMPLEX 45 MIN: CPT

## 2023-05-10 PROCEDURE — 92610 EVALUATE SWALLOWING FUNCTION: CPT

## 2023-05-10 PROCEDURE — 97162 PT EVAL MOD COMPLEX 30 MIN: CPT

## 2023-05-10 PROCEDURE — 1210000000 HC MED SURG R&B

## 2023-05-10 PROCEDURE — 83735 ASSAY OF MAGNESIUM: CPT

## 2023-05-10 PROCEDURE — 6360000002 HC RX W HCPCS: Performed by: INTERNAL MEDICINE

## 2023-05-10 PROCEDURE — 2580000003 HC RX 258: Performed by: INTERNAL MEDICINE

## 2023-05-10 PROCEDURE — 84145 PROCALCITONIN (PCT): CPT

## 2023-05-10 PROCEDURE — 80048 BASIC METABOLIC PNL TOTAL CA: CPT

## 2023-05-10 PROCEDURE — 6370000000 HC RX 637 (ALT 250 FOR IP): Performed by: INTERNAL MEDICINE

## 2023-05-10 PROCEDURE — 99232 SBSQ HOSP IP/OBS MODERATE 35: CPT | Performed by: SURGERY

## 2023-05-10 PROCEDURE — 36415 COLL VENOUS BLD VENIPUNCTURE: CPT

## 2023-05-10 PROCEDURE — 0202U NFCT DS 22 TRGT SARS-COV-2: CPT

## 2023-05-10 PROCEDURE — 85610 PROTHROMBIN TIME: CPT

## 2023-05-10 RX ADMIN — PIPERACILLIN AND TAZOBACTAM 3375 MG: 3; .375 INJECTION, POWDER, LYOPHILIZED, FOR SOLUTION INTRAVENOUS at 18:06

## 2023-05-10 RX ADMIN — GUAIFENESIN 600 MG: 600 TABLET, EXTENDED RELEASE ORAL at 08:26

## 2023-05-10 RX ADMIN — ENOXAPARIN SODIUM 40 MG: 100 INJECTION SUBCUTANEOUS at 08:25

## 2023-05-10 RX ADMIN — GUAIFENESIN 600 MG: 600 TABLET, EXTENDED RELEASE ORAL at 19:55

## 2023-05-10 RX ADMIN — PIPERACILLIN AND TAZOBACTAM 3375 MG: 3; .375 INJECTION, POWDER, LYOPHILIZED, FOR SOLUTION INTRAVENOUS at 08:30

## 2023-05-10 RX ADMIN — PIPERACILLIN AND TAZOBACTAM 3375 MG: 3; .375 INJECTION, POWDER, LYOPHILIZED, FOR SOLUTION INTRAVENOUS at 01:29

## 2023-05-10 NOTE — CARE COORDINATION
LSW called patient's daughter to discuss discharge plan and PT/OT SNF recommendation. Patient's daughter said she was in the room when therapy worked with patient and felt he did better than he did earlier in the week. She said she is hopeful he will be able to return to [facility] since he did not do well at [facility]. Daughter said she would like [facility] to do an onsite and determine if they can accept patient back before discussing SNF. LSW spoke with JI Mercer, and scheduled on site visit for tomorrow at 1:30. Daughter updated.

## 2023-05-10 NOTE — PLAN OF CARE
See OT evaluation for all goals and OT POC.  Electronically signed by SAÚL Strickland/L on 5/10/2023 at 1:49 PM

## 2023-05-10 NOTE — CARE COORDINATION
IV BENEFIT REQUEST FORM    [facility]                        [address] [facility]    REQUESTED BY: Electronically signed by Paul Tolliver RN on 5/10/2023 at 10:36 AM                                               RN/C3: PHONE: [phone])     DATE:/TIME OF REQUEST: 05/10/23  TIME: 10:36 AM      TO: Jiaden Meléndez [address] TO: [phone]    PHONE: [phone]     THIS PATIENT HAS BEEN IDENTIFIED TO POSSIBLY NEED LONG TERM IV'S. PLEASE CHECK INSURANCE COVERAGE FOR THE FOLLOWING PT/DRUGS. PATIENT'S NAME: Jann Matute                              ROOM: C381/Q303-28   PATIENT'S : 1932  PATIENT ADDRESS: [address] [place] [id number]  SSN:    [id number]     PAYOR NAME:  Payor: Raul Connolly / Plan: Edison Burch PPO / Product Type: Medicare /     DRUG: (ZOSYN)                         DOSE: (3375MG)            FREQUENCY: Q (8) HR    DRUG: ()                         DOSE: ()            FREQUENCY: Q () HR    DRUG: ()                         DOSE: ()            FREQUENCY: Q () HR    __________ CHECK HERE IF PT HAS NO INSURANCE AND REQUESTING SELF PAY COST. *IF [facility] IS NOT A PROVIDER FOR THIS PATIENT, PLEASE FORWARD INFO VIA FAX TO [facility] @ [phone],(PHONE NUMBER: [phone]) TO RUN BENEFIT VERIFICATION AND NOTIFY THE ABOVE C3 OF THIS PLAN. (FAX FACE SHEET WITH DEMOGRAPHICS AND INSURANCE INFO WITH THIS FORM.)  PLEASE FAX BENEFIT INFO TO: THE [phone]    This message is intended only for the use of the individual or entity to which it is addressed and may contain information that is privileged, confidential, and exempt from disclosure under applicable law.  If the reader of the notice is not intended recipient of the employee/agent responsible for delivering the message to the intended recipient, you are hereby notified than

## 2023-05-11 ENCOUNTER — APPOINTMENT (OUTPATIENT)
Dept: GENERAL RADIOLOGY | Age: 88
End: 2023-05-11
Payer: MEDICARE

## 2023-05-11 PROBLEM — R78.81 BACTEREMIA: Status: ACTIVE | Noted: 2023-05-11

## 2023-05-11 PROBLEM — F03.90 DEMENTIA (HCC): Status: ACTIVE | Noted: 2023-05-11

## 2023-05-11 PROBLEM — R41.82 ACUTE ALTERATION IN MENTAL STATUS: Status: ACTIVE | Noted: 2023-05-11

## 2023-05-11 LAB
ANION GAP SERPL CALCULATED.3IONS-SCNC: 12 MEQ/L (ref 9–15)
BASOPHILS # BLD: 0.1 K/UL (ref 0–0.2)
BASOPHILS NFR BLD: 0.5 %
BUN SERPL-MCNC: 13 MG/DL (ref 8–23)
CALCIUM SERPL-MCNC: 8.5 MG/DL (ref 8.5–9.9)
CHLORIDE SERPL-SCNC: 101 MEQ/L (ref 95–107)
CO2 SERPL-SCNC: 20 MEQ/L (ref 20–31)
CREAT SERPL-MCNC: 0.86 MG/DL (ref 0.7–1.2)
CRP SERPL HS-MCNC: 100 MG/L (ref 0–5)
CRP SERPL HS-MCNC: 154.1 MG/L (ref 0–5)
EOSINOPHIL # BLD: 0.4 K/UL (ref 0–0.7)
EOSINOPHIL NFR BLD: 4.2 %
ERYTHROCYTE [DISTWIDTH] IN BLOOD BY AUTOMATED COUNT: 14.7 % (ref 11.5–14.5)
GLUCOSE SERPL-MCNC: 120 MG/DL (ref 70–99)
HCT VFR BLD AUTO: 36.1 % (ref 42–52)
HGB BLD-MCNC: 12.3 G/DL (ref 14–18)
L PNEUMO AG UR QL IA: NEGATIVE
LYMPHOCYTES # BLD: 1.3 K/UL (ref 1–4.8)
LYMPHOCYTES NFR BLD: 12.9 %
MCH RBC QN AUTO: 30.6 PG (ref 27–31.3)
MCHC RBC AUTO-ENTMCNC: 34.2 % (ref 33–37)
MCV RBC AUTO: 89.6 FL (ref 79–92.2)
MONOCYTES # BLD: 0.7 K/UL (ref 0.2–0.8)
MONOCYTES NFR BLD: 7.1 %
NEUTROPHILS # BLD: 7.8 K/UL (ref 1.4–6.5)
NEUTS SEG NFR BLD: 75.3 %
PLATELET # BLD AUTO: 184 K/UL (ref 130–400)
POTASSIUM SERPL-SCNC: 4.5 MEQ/L (ref 3.4–4.9)
PROCALCITONIN SERPL IA-MCNC: 0.23 NG/ML (ref 0–0.15)
RBC # BLD AUTO: 4.03 M/UL (ref 4.7–6.1)
REASON FOR REJECTION: NORMAL
REJECTED TEST: NORMAL
SODIUM SERPL-SCNC: 133 MEQ/L (ref 135–144)
WBC # BLD AUTO: 10.4 K/UL (ref 4.8–10.8)

## 2023-05-11 PROCEDURE — 74230 X-RAY XM SWLNG FUNCJ C+: CPT

## 2023-05-11 PROCEDURE — 6370000000 HC RX 637 (ALT 250 FOR IP): Performed by: INTERNAL MEDICINE

## 2023-05-11 PROCEDURE — 36415 COLL VENOUS BLD VENIPUNCTURE: CPT

## 2023-05-11 PROCEDURE — 2500000003 HC RX 250 WO HCPCS: Performed by: INTERNAL MEDICINE

## 2023-05-11 PROCEDURE — 86140 C-REACTIVE PROTEIN: CPT

## 2023-05-11 PROCEDURE — 80048 BASIC METABOLIC PNL TOTAL CA: CPT

## 2023-05-11 PROCEDURE — 1210000000 HC MED SURG R&B

## 2023-05-11 PROCEDURE — 92611 MOTION FLUOROSCOPY/SWALLOW: CPT

## 2023-05-11 PROCEDURE — 2700000000 HC OXYGEN THERAPY PER DAY

## 2023-05-11 PROCEDURE — 6360000002 HC RX W HCPCS: Performed by: INTERNAL MEDICINE

## 2023-05-11 PROCEDURE — 85025 COMPLETE CBC W/AUTO DIFF WBC: CPT

## 2023-05-11 PROCEDURE — 2580000003 HC RX 258: Performed by: INTERNAL MEDICINE

## 2023-05-11 PROCEDURE — 84145 PROCALCITONIN (PCT): CPT

## 2023-05-11 RX ORDER — ASPIRIN 81 MG/1
81 TABLET, CHEWABLE ORAL DAILY
Status: DISCONTINUED | OUTPATIENT
Start: 2023-05-11 | End: 2023-05-12 | Stop reason: HOSPADM

## 2023-05-11 RX ORDER — PRAVASTATIN SODIUM 40 MG
80 TABLET ORAL EVERY EVENING
Status: DISCONTINUED | OUTPATIENT
Start: 2023-05-11 | End: 2023-05-12 | Stop reason: HOSPADM

## 2023-05-11 RX ORDER — METOPROLOL SUCCINATE 25 MG/1
25 TABLET, EXTENDED RELEASE ORAL DAILY
Status: DISCONTINUED | OUTPATIENT
Start: 2023-05-11 | End: 2023-05-12 | Stop reason: HOSPADM

## 2023-05-11 RX ORDER — TORSEMIDE 20 MG/1
20 TABLET ORAL DAILY
Status: DISCONTINUED | OUTPATIENT
Start: 2023-05-11 | End: 2023-05-12 | Stop reason: HOSPADM

## 2023-05-11 RX ORDER — ISOSORBIDE MONONITRATE 30 MG/1
30 TABLET, EXTENDED RELEASE ORAL DAILY
Status: DISCONTINUED | OUTPATIENT
Start: 2023-05-11 | End: 2023-05-12 | Stop reason: HOSPADM

## 2023-05-11 RX ADMIN — PRAVASTATIN SODIUM 80 MG: 40 TABLET ORAL at 17:34

## 2023-05-11 RX ADMIN — BARIUM SULFATE 50 ML: 0.81 POWDER, FOR SUSPENSION ORAL at 13:18

## 2023-05-11 RX ADMIN — BARIUM SULFATE 80 ML: 400 SUSPENSION ORAL at 13:18

## 2023-05-11 RX ADMIN — ISOSORBIDE MONONITRATE 30 MG: 30 TABLET, EXTENDED RELEASE ORAL at 09:36

## 2023-05-11 RX ADMIN — PIPERACILLIN AND TAZOBACTAM 3375 MG: 3; .375 INJECTION, POWDER, LYOPHILIZED, FOR SOLUTION INTRAVENOUS at 17:35

## 2023-05-11 RX ADMIN — PIPERACILLIN AND TAZOBACTAM 3375 MG: 3; .375 INJECTION, POWDER, LYOPHILIZED, FOR SOLUTION INTRAVENOUS at 01:14

## 2023-05-11 RX ADMIN — GUAIFENESIN 600 MG: 600 TABLET, EXTENDED RELEASE ORAL at 08:45

## 2023-05-11 RX ADMIN — TORSEMIDE 20 MG: 20 TABLET ORAL at 09:36

## 2023-05-11 RX ADMIN — ASPIRIN 81 MG 81 MG: 81 TABLET ORAL at 09:36

## 2023-05-11 RX ADMIN — PIPERACILLIN AND TAZOBACTAM 3375 MG: 3; .375 INJECTION, POWDER, LYOPHILIZED, FOR SOLUTION INTRAVENOUS at 08:47

## 2023-05-11 RX ADMIN — METOPROLOL SUCCINATE 25 MG: 25 TABLET, EXTENDED RELEASE ORAL at 09:36

## 2023-05-11 RX ADMIN — GUAIFENESIN 600 MG: 600 TABLET, EXTENDED RELEASE ORAL at 21:56

## 2023-05-11 RX ADMIN — ENOXAPARIN SODIUM 40 MG: 100 INJECTION SUBCUTANEOUS at 08:45

## 2023-05-11 NOTE — CARE COORDINATION
Onsite visit with Jose Plascencia scheduled for today at 1:30 to determine if patient is able to return or if he will need SNF.

## 2023-05-11 NOTE — CONSULTS
per electronic record and most pertinent abnormalities are being addressed from an infectious disease standpoint. ASSESSMENT:  Acute mental status changes  Fever  Bacteremia    For bacteremia gram-positive rods can suggest contamination work-up, if there is a GI focus like gallbladder this could explain it, sludging? . Possible early pneumonia.      PLAN:  Continue current antibiotics
TECHNOLOGIST PROVIDED HISTORY: Reason for exam:->fever cough eval pna What reading provider will be dictating this exam?->CRC FINDINGS: Suggestion of a prominent hiatal hernia. Improved pulmonary aeration. Some density increase at right central location noted. Left base probable atelectasis/scar. Improved aeration but opacification centrally on the right is possible for pneumonia versus superimposing vasculature or hiatal hernia. ASSESSMENT AND PLAN:   Andrae Jean Baptiste is a 80 y.o. male who presents with increasing altered mental status from home. Patient is a 72-year-old male being in assisted living with his wife he is brought in with his daughter as well. Patient over the past couple days has had increasing lethargy weakness confusion increasing confusion he has had an occasional cough productive of sputum. On arrival to ER patient is a mildly elevated temperature not quite to fever 100.3 respirations are in the 30s heart rate is 111 and blood pressure is 132/91. Patient is saturating well on room air however. Base metabolic panel is relatively benign sodium is 131 lactic acid is 3.0 on arrival and white count is 23.6 patient was started on gentle fluids and lactic acid did improve to 1.3. CT abdomen and chest were performed showing a large hiatal hernia with a left basilar atelectasis versus early infiltrate and rather distended gallbladder with large gallstone and gallbladder wall thickening.      - Patient doesn't have any tenderness on his abdominal exam  - CT showing gallstones and gallbladder wall thickening, it could be because of chronic irritation of stones  - HIDA scan to rule out acute cholecystitis.       Hossein Bruce MD   General surgeon    Electronically signed by Hossein Bruce MD [facility], on 5/9/2023

## 2023-05-11 NOTE — PROCEDURES
swallow residue reduced, but did not fully clear. Trace residue was noted along the posterior pharynx and pyriform sinus with most trials. Patient presented with reduced base of tongue strength and reduced pharyngeal contraction. Delayed swallow initiation occurred with all trials. Patient required repetition of verbal cueing and visual cueing for patient to consume small-single sips. Patient was impulsive at times. Patient would benefit from supervision during all meals to improve po tolerance and improve adherence to feeding guidelines. Patient presented with intermittent throat clearing during the MBS, which was unrelated to laryngeal penetration/aspiration. CERVICAL ESOPHAGEAL STAGE :   No impairment observed            THERAPY RECOMMENDATIONS:   Therapy is recommended to:  Assess tolerance of recommended diet  Improve oral motor strength and range of motion  Improve tongue base retraction  Improve laryngeal strength and range of motion      Nursing notified: Kristin Trinh 85:   Long Term Goals:    Patient will tolerate least restrictive diet with no overt s/s of difficulty or aspiration. Short Term Goals:   Pt to be seen 2-3x/week during LOS or until goals met    1. Pt will complete lingual ROM and strengthening exercises (lingual press, lingual pull backs) with 90% accuracy, in order to promote anterior to posterior propulsion of bolus and improve tongue base retraction. 2.   Pt will improve hyolaryngeal elevation by performing laryngeal exercises (effortful swallow, Mendelsohn maneuver, falsetto & effortful pitch glide) with 90% accuracy in order to strengthen and establish a more effective swallow.   3.  Pt will complete pharyngeal strengthening exercises (chin tuck against resistance, Shaker head lift, effortful swallow, Mendelsohn maneuver, Alessia, falsetto & effortful pitch glide, supraglottic swallow, super supraglottic swallow) with 90% accuracy in order to strengthen and

## 2023-05-12 VITALS
TEMPERATURE: 98.8 F | BODY MASS INDEX: 27.05 KG/M2 | HEART RATE: 91 BPM | RESPIRATION RATE: 18 BRPM | OXYGEN SATURATION: 98 % | DIASTOLIC BLOOD PRESSURE: 68 MMHG | HEIGHT: 72 IN | SYSTOLIC BLOOD PRESSURE: 127 MMHG | WEIGHT: 199.7 LBS

## 2023-05-12 LAB
ANION GAP SERPL CALCULATED.3IONS-SCNC: 10 MEQ/L (ref 9–15)
BASOPHILS # BLD: 0 K/UL (ref 0–0.2)
BASOPHILS NFR BLD: 0.6 %
BUN SERPL-MCNC: 11 MG/DL (ref 8–23)
CALCIUM SERPL-MCNC: 8.4 MG/DL (ref 8.5–9.9)
CHLORIDE SERPL-SCNC: 102 MEQ/L (ref 95–107)
CO2 SERPL-SCNC: 26 MEQ/L (ref 20–31)
CREAT SERPL-MCNC: 0.91 MG/DL (ref 0.7–1.2)
CRP SERPL HS-MCNC: 61.8 MG/L (ref 0–5)
CULTURE, BLOOD ID SENSITIVITY: ABNORMAL
CULTURE, BLOOD ID SENSITIVITY: ABNORMAL
EOSINOPHIL # BLD: 0.4 K/UL (ref 0–0.7)
EOSINOPHIL NFR BLD: 4.2 %
ERYTHROCYTE [DISTWIDTH] IN BLOOD BY AUTOMATED COUNT: 14.6 % (ref 11.5–14.5)
GLUCOSE SERPL-MCNC: 132 MG/DL (ref 70–99)
HCT VFR BLD AUTO: 36.9 % (ref 42–52)
HGB BLD-MCNC: 12.3 G/DL (ref 14–18)
LYMPHOCYTES # BLD: 1.3 K/UL (ref 1–4.8)
LYMPHOCYTES NFR BLD: 15.1 %
MCH RBC QN AUTO: 29.8 PG (ref 27–31.3)
MCHC RBC AUTO-ENTMCNC: 33.3 % (ref 33–37)
MCV RBC AUTO: 89.3 FL (ref 79–92.2)
MONOCYTES # BLD: 0.9 K/UL (ref 0.2–0.8)
MONOCYTES NFR BLD: 9.7 %
NEUTROPHILS # BLD: 6.2 K/UL (ref 1.4–6.5)
NEUTS SEG NFR BLD: 70.4 %
ORGANISM: ABNORMAL
PLATELET # BLD AUTO: 197 K/UL (ref 130–400)
POTASSIUM SERPL-SCNC: 3.9 MEQ/L (ref 3.4–4.9)
PROCALCITONIN SERPL IA-MCNC: 0.16 NG/ML (ref 0–0.15)
RBC # BLD AUTO: 4.14 M/UL (ref 4.7–6.1)
SODIUM SERPL-SCNC: 138 MEQ/L (ref 135–144)
WBC # BLD AUTO: 8.8 K/UL (ref 4.8–10.8)

## 2023-05-12 PROCEDURE — 84145 PROCALCITONIN (PCT): CPT

## 2023-05-12 PROCEDURE — 2580000003 HC RX 258: Performed by: INTERNAL MEDICINE

## 2023-05-12 PROCEDURE — 6360000002 HC RX W HCPCS: Performed by: INTERNAL MEDICINE

## 2023-05-12 PROCEDURE — 36415 COLL VENOUS BLD VENIPUNCTURE: CPT

## 2023-05-12 PROCEDURE — 97535 SELF CARE MNGMENT TRAINING: CPT

## 2023-05-12 PROCEDURE — 6370000000 HC RX 637 (ALT 250 FOR IP): Performed by: INTERNAL MEDICINE

## 2023-05-12 PROCEDURE — 92526 ORAL FUNCTION THERAPY: CPT

## 2023-05-12 PROCEDURE — 86140 C-REACTIVE PROTEIN: CPT

## 2023-05-12 PROCEDURE — 85025 COMPLETE CBC W/AUTO DIFF WBC: CPT

## 2023-05-12 PROCEDURE — 80048 BASIC METABOLIC PNL TOTAL CA: CPT

## 2023-05-12 RX ORDER — AMOXICILLIN AND CLAVULANATE POTASSIUM 875; 125 MG/1; MG/1
1 TABLET, FILM COATED ORAL 2 TIMES DAILY
Qty: 14 TABLET | Refills: 0 | Status: SHIPPED | OUTPATIENT
Start: 2023-05-12 | End: 2023-05-19

## 2023-05-12 RX ADMIN — ISOSORBIDE MONONITRATE 30 MG: 30 TABLET, EXTENDED RELEASE ORAL at 08:42

## 2023-05-12 RX ADMIN — PIPERACILLIN AND TAZOBACTAM 3375 MG: 3; .375 INJECTION, POWDER, LYOPHILIZED, FOR SOLUTION INTRAVENOUS at 03:01

## 2023-05-12 RX ADMIN — METOPROLOL SUCCINATE 25 MG: 25 TABLET, EXTENDED RELEASE ORAL at 08:42

## 2023-05-12 RX ADMIN — GUAIFENESIN 600 MG: 600 TABLET, EXTENDED RELEASE ORAL at 08:42

## 2023-05-12 RX ADMIN — PIPERACILLIN AND TAZOBACTAM 3375 MG: 3; .375 INJECTION, POWDER, LYOPHILIZED, FOR SOLUTION INTRAVENOUS at 08:46

## 2023-05-12 RX ADMIN — PRAVASTATIN SODIUM 80 MG: 40 TABLET ORAL at 17:43

## 2023-05-12 RX ADMIN — ASPIRIN 81 MG 81 MG: 81 TABLET ORAL at 08:42

## 2023-05-12 RX ADMIN — TORSEMIDE 20 MG: 20 TABLET ORAL at 08:42

## 2023-05-12 NOTE — DISCHARGE INSTRUCTIONS
Follow up with Dr. Kenney Farrell in the next 7 days or sooner if needed. Please return to ER or call 911 if you develop any significant signs or symptoms. I may or may not have addressed all of your symptoms, medical issues,  Illnesses, or all of the abnormal blood work or imaging therefore please ask your PCP and other specialists to obtain [facility] record entirely to follow up on all of your symptoms, illnesses, abnormal labs, imaging and findings that I have and have not addressed during your hospitalization. Discharging you from the hospital does not mean that your medical care ends here and now. You may still need to have additional work up, investigation, monitoring, surveillance, and treatment to be handled from this point on by in the out patient setting by  out patient providers including your PCP, Specialists and other healthcare providers. For medication questions, contact your retail pharmacy and your PCP. Your medical team at [phone] appreciates the opportunity to work with you to get well!     Susan Cox MD

## 2023-05-12 NOTE — DISCHARGE SUMMARY
and small bowel is visualized in appropriate sequence. The gallbladder has an unusual shape, but matches the appearance on the recent CT scan. Appropriate emptying was demonstrated with Ensure Plus with calculated ejection fraction of 63%. 1.  Common bile duct and cystic duct are patent. No acute cholecystitis. Discharge Medications:         Medication List        START taking these medications      amoxicillin-clavulanate 875-125 MG per tablet  Commonly known as: AUGMENTIN  Take 1 tablet by mouth 2 times daily for 7 days            CONTINUE taking these medications      aspirin 81 MG chewable tablet  Take 1 tablet by mouth daily     ipratropium-albuterol 0.5-2.5 (3) MG/3ML Soln nebulizer solution  Commonly known as: DUONEB  Inhale 3 mLs into the lungs every 4 hours as needed for Shortness of Breath     isosorbide mononitrate 30 MG extended release tablet  Commonly known as: IMDUR  Take 1 tablet by mouth daily     metoprolol succinate 25 MG extended release tablet  Commonly known as: TOPROL XL  Take 1 tablet by mouth daily     pravastatin 80 MG tablet  Commonly known as: Pravachol  Take 1 tablet by mouth every evening     torsemide 20 MG tablet  Commonly known as: DEMADEX  Take 1 tablet by mouth daily               Where to Get Your Medications        These medications were sent to [address] [facility], [address] [address].      Phone: [phone]   amoxicillin-clavulanate 875-125 MG per tablet         Disposition:   Discharged to Home. Any [facility] needs that were indicated and/or required as been addressed and set up by Social Work. Condition at discharge: Pt was medically stable at the time of discharge. Significant improvement in clinical condition compared to initial condition at presentation to hospital    Activity: activity as tolerated, fall precautions.      Total time taken for discharging this patient:

## 2023-05-12 NOTE — DISCHARGE INSTR - COC
05/10/23 Rule-Out Test Resulted    COVID-19 (Rule Out) 05/08/23 05/08/23 05/08/23 Respiratory Panel, Molecular, with COVID-19 (Restricted: peds pts or suitable admitted adults) (Ordered)   05/08/23 Rule-Out Test Resulted    COVID-19 (Rule Out) 01/20/23 01/20/23 01/20/23 Respiratory Panel, Molecular, with COVID-19 (Restricted: peds pts or suitable admitted adults) (Ordered)   01/20/23 Rule-Out Test Resulted    COVID-19 (Rule Out) 01/19/23 01/19/23 01/20/23 Respiratory Panel, Molecular, with COVID-19 (Restricted: peds pts or suitable admitted adults) (Ordered)   01/20/23 Rule-Out Test Canceled            Nurse Assessment:  Last Vital Signs: BP (!) 159/83   Pulse (!) 108   Temp 97.9 °F (36.6 °C) (Oral)   Resp 18   Ht 6' (1.829 m)   Wt 199 lb 11.2 oz (90.6 kg)   SpO2 97%   BMI 27.08 kg/m²     Last documented pain score (0-10 scale):    Last Weight:   Wt Readings from Last 1 Encounters:   05/12/23 199 lb 11.2 oz (90.6 kg)     Mental Status:   unknown    IV Access:  - None    Nursing Mobility/ADLs:  Walking   bedbound  Transfer  Dependent  Bathing  Dependent  Dressing  Dependent  Toileting  Dependent  Feeding  Dependent  Med Admin  Dependent  Med Delivery   crushed in pudding    Wound Care Documentation and Therapy:        Elimination:  Continence: Bowel: Yes  Bladder: Yes  Urinary Catheter: None   Colostomy/Ileostomy/Ileal Conduit: No       Date of Last BM: unknown    Intake/Output Summary (Last 24 hours) at 5/12/2023 1629  Last data filed at 5/12/2023 1215  Gross per 24 hour   Intake 1360.35 ml   Output --   Net 1360.35 ml     I/O last 3 completed shifts: In: 640.4 [P.O.:240; IV Piggyback:400.4]  Out: 200 [Urine:200]    Safety Concerns:      At Risk for Falls    Impairments/Disabilities:      Speech    Nutrition Therapy:  Current Nutrition Therapy:   - Oral Diet:  Dysphagia - Pureed    Routes of Feeding: Oral  Liquids: Honey Thick Liquids  Daily Fluid Restriction: no  Last Modified Barium Swallow with Video

## 2023-05-12 NOTE — NURSE NAVIGATOR
Report called to facility nurse, Barbara Maher at [facility]. Daughter aware  has been delayed until 7pm. Patient's belongings are packed and on bed.

## 2023-05-12 NOTE — CARE COORDINATION
LSW called [facility] to inform them patient may need IV antibiotics. LSW spoke with [facility], who said they would not be able to accept patient on IV antibiotics even if family was able to come in and help. LSW called patient's daughter to explain this and that patient would need SNF. Daughter expressed frustration that patient would not be able to go back to the assisted living but agreed to [facility]. Referral sent to [facility]. [facility] is able to accept and will start pre cert today.

## 2023-05-12 NOTE — CARE COORDINATION
PER LSW Sofie Morejon 79 ON IV ABX AND WILL NEED SNF. PER LSW DTR IS FRUSTRATED AND HAS ALSO VOICED TO THIS CM HER CONCERN IF PT WOULD NEED SNF. PER CONVERSATION EARLIER THIS WEEK THE DTR HAD STATED PT DOES NOT DO WELL MENTALLY OR EMOTIONALLY WITH BEING PLACE IN SNF. CM DISCUSSED WITH DR Jessica Nuno FOR POSSIBLE PO ABX, DR WILSON TO DISCUSS WITH DR AVILA.

## 2023-05-12 NOTE — CARE COORDINATION
Patient no longer requiring IV antibiotics at discharge. LSW called patient's daughter to inform her of this and that he can return to the assisted living. Daughter expressed gratitude that patient would be able to return back to the assisted living with his wife. She said she will not be able to transport patient tonight and requested an ambulance.

## 2023-05-12 NOTE — PROGRESS NOTES
[address] [facility] Dose Adjustment Per Protocol:  Zosyn Extended Interval Interchange      Ivon Davila is a 80 y.o. male. The following ordered dose of Zosyn has been changed to optimize its pharmacodynamic profile per [facility] pharmacy policy approved by [facility]. Recent Labs     05/08/23  2200 05/08/23  2216   CREATININE 1.12 1.0   WBC 23.6*  --      . Height: 6' (182.9 cm), Weight - Scale: 190 lb (86.2 kg), Body mass index is 25.77 kg/m². Estimated Creatinine Clearance: 54 mL/min (based on SCr of 1 mg/dL). Medication Ordered:   Traditional Dosing   [] Zosyn 2.25 gm q6-8 hr   [] Zosyn 3.375 gm q6-8 hr   [] Zosyn 4.5 gm q6-8 hr   [] Zosyn 2.25 gm q6-8 hr   [] Zosyn 3.375 gm q6-8 hr   [x] Zosyn 4.5 gm q6-8 hr     New Dose      Piperacillin/Tazobactam - Extended Infusion Dosing (4-hour infusion) - Preferred Dosing Strategy       Renal Function (CrCl mL/min)  ? 20  < 20, HD, PD  CRRT    All Indications - Loading dose of 4500 milligrams x 1 over 30 minutes or via IV push. Maintenance dose  should begin 6 hours after load for CrCl > 20 and 8 hours after load for CrCl < 20. Maintenance dosing for all indications except as outlined below  [x] 3375mg q8h  [] 3375mg q12h  [] 3375mg q8h    Febrile neutropenia, Cystic fibrosis, BMI > 40*, local Pseudomonas susceptibility < 80% (refer to page 3 for indications)     [] 4500mg q8h  [] 4500 q12h  [] 4500mg q8h    *Consider 3375mg q8h for indication of Urinary tract infections in BMI > 40. Adjust for decreased renal function.        No loading dose as pt already given dose x 1 in ED    Thank Elizabeth Whatley, [address] [facility]  5/9/2023 3:25 AM
Comprehensive Nutrition Assessment    Type and Reason for Visit:  RD Nutrition Re-Screen/LOS, Initial    Nutrition Recommendations/Plan:   Continue Minced and Moist diet  Recommend upgrade to mildly thick liquids, per MBS results 5/11  Provide frozen oral supplement all trays  Documentation of meal intakes would be helpful in further assessment and determination for changes to the nutrition plan of care  Monitor need for supplemental IVF, due to high risk for dehydration with thickened liquids     Malnutrition Assessment:  Malnutrition Status:  Insufficient data (05/12/23 0821)    Context:  Social/Environmental Circumstances     Findings of the 6 clinical characteristics of malnutrition:  Energy Intake:  Unable to assess  Weight Loss:  Unable to assess     Body Fat Loss:  No significant body fat loss     Muscle Mass Loss:  No significant muscle mass loss    Fluid Accumulation:  Mild Extremities   Strength:  Not Performed    Nutrition Assessment:    Pt considered to be at risk for malnutrition related to confusion and dysphagia, no meal intake documented since admit, pt unable to provide reliable information, will begin a consistency appropriate oral supplement to aid in meeting energy and protein needs, request diet upgrade per MBS results and monitor for adequacy of fluid intake with thick liquids    Nutrition Related Findings:    \"dementia with baseline confusion, hypertension, hyperlipidemia, diastolic CHF. presents from nursing facility due to generalized weakness, decreased ambulation and participation in ADLs, fever, cough, per nursing report symptoms present for approximately 1 week\" CT showing gallstones and gallbladder wall thickening, it could be because of chronic irritation of stones , HIDA scan is negative for Acute cholecystitis. , no surgical indication at this time\" MBS completed 5/11 with recommendations noted to upgrade to mildly thick liquids, Difficult to determine weight changes due to 3+
GENERAL SURGERY  PROGRESS NOTE    Pt Name: Hyacinth Szymanski  MRN: [id number]  Date: 5/10/2023    Subjective  HARRY overnight. Afebrile, VSS, satting well. Pt doing well, reports pain is controlled. No nausea/ vomiting. Flatus, BM. Ambulating. Vitals  /65   Pulse 99   Temp 98.1 °F (36.7 °C)   Resp 18   Ht 6' (1.829 m)   Wt 202 lb 11.2 oz (91.9 kg)   SpO2 95%   BMI 27.49 kg/m²      Physical Exam  GEN: A&Ox3, NAD, cooperative   PULM: no increased work of breathing, satting well  CV: regular rate  ABD: Soft, NTND,   EXT: Warm, dry, no lower extremity edema    Intake/ Output    Intake/Output Summary (Last 24 hours) at 5/10/2023 1426  Last data filed at 5/9/2023 2242  Gross per 24 hour   Intake --   Output 450 ml   Net -450 ml         Labs  Recent Labs     05/08/23  2200 05/08/23  2216 05/09/23  0528 05/09/23  1019 05/10/23  0449   WBC 23.6*  --   --  18.1* 13.3*   HGB 13.0* 13.7  --  11.6* 11.7*   HCT 39.3*  --   --  34.2* 34.6*     --   --  165 151   *  --  142  --  138   K 3.7  --  3.7  --  3.4   CL 92*  --  101  --  99   CO2 26  --  27  --  27   BUN 14  --  14  --  13   CREATININE 1.12 1.0 1.03  --  0.86   MG 2.1  --   --   --  2.2   CALCIUM 8.8  --  8.3*  --  8.4*   INR  --   --   --   --  1.2   AST 23  --   --   --  16   ALT 12  --   --   --  10   BILITOT 0.8*  --   --   --  0.6   BILIDIR  --   --   --   --  <0.2   LACTA  --   --   --  1.3  --    NITRU Negative  --   --   --   --    COLORU Yellow  --   --   --   --          Assessment/ Plan    Hyacinth Szymanski is a 80 y.o. male who presents with increasing altered mental status from home. Patient is a 61-year-old male being in assisted living with his wife he is brought in with his daughter as well. Patient over the past couple days has had increasing lethargy weakness confusion increasing confusion he has had an occasional cough productive of sputum.   On arrival to ER patient is a mildly elevated temperature not quite to fever 100.3
Hospitalist Progress Note      PCP: Bernida Hashimoto, MD    Date of Admission: 5/8/2023    Chief Complaint:  no acute events, afebrile, stable HD, on RA, is scheduled for MBS today per speech therapy    Medications:  Reviewed    Infusion Medications   Scheduled Medications    piperacillin-tazobactam  3,375 mg IntraVENous Q8H    guaiFENesin  600 mg Oral BID    enoxaparin  40 mg SubCUTAneous Daily     PRN Meds: ipratropium-albuterol, acetaminophen, ondansetron, sodium chloride flush      Intake/Output Summary (Last 24 hours) at 5/11/2023 0856  Last data filed at 5/10/2023 1853  Gross per 24 hour   Intake --   Output 450 ml   Net -450 ml         Exam:    /80   Pulse (!) 102   Temp 98.2 °F (36.8 °C)   Resp 20   Ht 6' (1.829 m)   Wt 200 lb 9.6 oz (91 kg)   SpO2 93%   BMI 27.21 kg/m²     General appearance: awake, cooperative. Respiratory: clear to auscultation, bilaterally  Cardiovascular: S1/S2, RRR, murmur present. Abdomen: Soft, active bowel sounds. Musculoskeletal: chronic LE edema / discoloration bilaterally.       Labs:   Recent Labs     05/09/23  1019 05/10/23  0449 05/11/23  0748   WBC 18.1* 13.3* 10.4   HGB 11.6* 11.7* 12.3*   HCT 34.2* 34.6* 36.1*    151 184       Recent Labs     05/08/23 2200 05/08/23 2216 05/09/23  0528 05/10/23  0449   *  --  142 138   K 3.7  --  3.7 3.4   CL 92*  --  101 99   CO2 26  --  27 27   BUN 14  --  14 13   CREATININE 1.12 1.0 1.03 0.86   CALCIUM 8.8  --  8.3* 8.4*       Recent Labs     05/08/23  2200 05/10/23  0449   AST 23 16   ALT 12 10   BILIDIR  --  <0.2   BILITOT 0.8* 0.6   ALKPHOS 78 62       Recent Labs     05/10/23  0449   INR 1.2       Recent Labs     05/08/23 2200   TROPONINI <0.010         Urinalysis:      Lab Results   Component Value Date/Time    NITRU Negative 05/08/2023 10:00 PM    BLOODU Negative 05/08/2023 10:00 PM    SPECGRAV 1.015 05/08/2023 10:00 PM    GLUCOSEU Negative 05/08/2023 10:00 PM       Radiology:  [facility]
Hospitalist Progress Note      PCP: Chasity Cardenas MD    Date of Admission: 5/8/2023    Chief Complaint:  no acute events, afebrile, stable HD, on RA, is experiencing cough, doing well per nursing staff    Medications:  Reviewed    Infusion Medications   Scheduled Medications    piperacillin-tazobactam  3,375 mg IntraVENous Q8H    guaiFENesin  600 mg Oral BID    enoxaparin  40 mg SubCUTAneous Daily     PRN Meds: ipratropium-albuterol, acetaminophen, ondansetron, sodium chloride flush      Intake/Output Summary (Last 24 hours) at 5/10/2023 1117  Last data filed at 5/9/2023 2242  Gross per 24 hour   Intake --   Output 450 ml   Net -450 ml       Exam:    BP (!) 129/90   Pulse (!) 112   Temp 98.8 °F (37.1 °C) (Oral)   Resp 18   Ht 6' (1.829 m)   Wt 202 lb 11.2 oz (91.9 kg)   SpO2 94%   BMI 27.49 kg/m²     General appearance: awake, cooperative. Respiratory: clear to auscultation, bilaterally  Cardiovascular: S1/S2, RRR, murmur present. Abdomen: Soft, active bowel sounds. Musculoskeletal: chronic LE edema / discoloration bilaterally.       Labs:   Recent Labs     05/08/23 2200 05/08/23 2216 05/09/23  1019 05/10/23  0449   WBC 23.6*  --  18.1* 13.3*   HGB 13.0* 13.7 11.6* 11.7*   HCT 39.3*  --  34.2* 34.6*     --  165 151       Recent Labs     05/08/23 2200 05/08/23 2216 05/09/23  0528 05/10/23  0449   *  --  142 138   K 3.7  --  3.7 3.4   CL 92*  --  101 99   CO2 26  --  27 27   BUN 14  --  14 13   CREATININE 1.12 1.0 1.03 0.86   CALCIUM 8.8  --  8.3* 8.4*       Recent Labs     05/08/23  2200 05/10/23  0449   AST 23 16   ALT 12 10   BILIDIR  --  <0.2   BILITOT 0.8* 0.6   ALKPHOS 78 62       Recent Labs     05/10/23  0449   INR 1.2     Recent Labs     05/08/23  2200   TROPONINI <0.010         Urinalysis:      Lab Results   Component Value Date/Time    NITRU Negative 05/08/2023 10:00 PM    BLOODU Negative 05/08/2023 10:00 PM    SPECGRAV 1.015 05/08/2023 10:00 PM    GLUCOSEU Negative 05/08/2023 10:00
Hospitalist Progress Note      PCP: Loly Osborne MD    Date of Admission: 5/8/2023    Chief Complaint:  no acute events, afebrile, stable HD, on RA    Medications:  Reviewed    Infusion Medications   Scheduled Medications    aspirin  81 mg Oral Daily    isosorbide mononitrate  30 mg Oral Daily    metoprolol succinate  25 mg Oral Daily    pravastatin  80 mg Oral QPM    torsemide  20 mg Oral Daily    piperacillin-tazobactam  3,375 mg IntraVENous Q8H    guaiFENesin  600 mg Oral BID    enoxaparin  40 mg SubCUTAneous Daily     PRN Meds: ipratropium-albuterol, acetaminophen, ondansetron, sodium chloride flush      Intake/Output Summary (Last 24 hours) at 5/12/2023 1018  Last data filed at 5/12/2023 0643  Gross per 24 hour   Intake 640.35 ml   Output 200 ml   Net 440.35 ml         Exam:    BP (!) 159/83   Pulse (!) 108   Temp 97.9 °F (36.6 °C) (Oral)   Resp 18   Ht 6' (1.829 m)   Wt 199 lb 11.2 oz (90.6 kg)   SpO2 97%   BMI 27.08 kg/m²     General appearance: awake, cooperative. Respiratory: clear to auscultation, bilaterally  Cardiovascular: S1/S2, RRR, murmur present. Abdomen: Soft, active bowel sounds. Musculoskeletal: chronic LE edema / discoloration bilaterally. Labs:   Recent Labs     05/10/23  0449 05/11/23  0748 05/12/23  0557   WBC 13.3* 10.4 8.8   HGB 11.7* 12.3* 12.3*   HCT 34.6* 36.1* 36.9*    184 197       Recent Labs     05/10/23  0449 05/11/23  0526 05/12/23  0557    133* 138   K 3.4 4.5 3.9   CL 99 101 102   CO2 27 20 26   BUN 13 13 11   CREATININE 0.86 0.86 0.91   CALCIUM 8.4* 8.5 8.4*       Recent Labs     05/10/23  0449   AST 16   ALT 10   BILIDIR <0.2   BILITOT 0.6   ALKPHOS 62       Recent Labs     05/10/23  0449   INR 1.2       No results for input(s): Clearance Bah in the last 72 hours.       Urinalysis:      Lab Results   Component Value Date/Time    NITRU Negative 05/08/2023 10:00 PM    BLOODU Negative 05/08/2023 10:00 PM    SPECGRAV 1.015 05/08/2023 10:00 PM
Hospitalist Progress Note      PCP: Nichole Chan MD    Date of Admission: 5/8/2023    Chief Complaint:  no acute events, afebrile, stable HD    Medications:  Reviewed    Infusion Medications   Scheduled Medications    piperacillin-tazobactam  3,375 mg IntraVENous Q8H    guaiFENesin  600 mg Oral BID    enoxaparin  40 mg SubCUTAneous Daily     PRN Meds: ipratropium-albuterol, acetaminophen, ondansetron    No intake or output data in the 24 hours ending 05/09/23 1000    Exam:    BP (!) 120/57   Pulse 94   Temp 98.4 °F (36.9 °C)   Resp 28   Ht 6' (1.829 m)   Wt 193 lb 11.2 oz (87.9 kg)   SpO2 98%   BMI 26.27 kg/m²     General appearance: awake, cooperative. Respiratory: clear to auscultation, bilaterally  Cardiovascular: S1/S2, RRR, murmur present. Abdomen: Soft, active bowel sounds. Musculoskeletal: chronic LE edema / discoloration bilaterally. Labs:   Recent Labs     05/08/23 2200 05/08/23 2216   WBC 23.6*  --    HGB 13.0* 13.7   HCT 39.3*  --      --      Recent Labs     05/08/23 2200 05/08/23 2216 05/09/23  0528   *  --  142   K 3.7  --  3.7   CL 92*  --  101   CO2 26  --  27   BUN 14  --  14   CREATININE 1.12 1.0 1.03   CALCIUM 8.8  --  8.3*     Recent Labs     05/08/23 2200   AST 23   ALT 12   BILITOT 0.8*   ALKPHOS 78     No results for input(s): INR in the last 72 hours. Recent Labs     05/08/23 2200   TROPONINI <0.010       Urinalysis:      Lab Results   Component Value Date/Time    NITRU Negative 05/08/2023 10:00 PM    BLOODU Negative 05/08/2023 10:00 PM    SPECGRAV 1.015 05/08/2023 10:00 PM    GLUCOSEU Negative 05/08/2023 10:00 PM       Radiology:  CT CHEST W CONTRAST   Final Result   Ground-glass opacity at the lung bases favoring atelectatic change however   early infiltrate cannot be completely excluded in the appropriate clinical   setting. Remainder of the CT chest is grossly unremarkable.          CT ABDOMEN PELVIS W IV CONTRAST Additional Contrast? None   Final Result
Infectious Disease         Pt doing better. Afebrile. Not sob      No Known Allergies      History reviewed. No pertinent family history. Physical Exam:     Blood pressure (!) 159/83, pulse (!) 108, temperature 97.9 °F (36.6 °C), temperature source Oral, resp. rate 18, height 6' (1.829 m), weight 199 lb 11.2 oz (90.6 kg), SpO2 97 %. General: Patient appears ok at the present time. NAD  Skin: no new rashes  HEENT:  Neck is supple, No subconjunctival hemorrhages, no oral exudates  Heart: S1 S2  Lungs: Bases diminshed  Abdomen: soft, ND, NTTP,   Back :no CVA tenderness  Extrem: No edema, non tender  Neuro exam: CN II-XII intact  Psych: cooperative    Labs: I have reviewed all lab results by electronic record, including most recent CBC, metabolic panel, and pertinent abnormalities were addressed from an infectious disease perspective. Trends are being monitored over time. Lab Results   Component Value Date    WBC 8.8 05/12/2023    HGB 12.3 (L) 05/12/2023    HCT 36.9 (L) 05/12/2023    MCV 89.3 05/12/2023     05/12/2023     Lab Results   Component Value Date/Time     05/12/2023 05:57 AM    K 3.9 05/12/2023 05:57 AM     05/12/2023 05:57 AM    CO2 26 05/12/2023 05:57 AM    BUN 11 05/12/2023 05:57 AM    CREATININE 0.91 05/12/2023 05:57 AM    GLUCOSE 132 05/12/2023 05:57 AM    CALCIUM 8.4 05/12/2023 05:57 AM    LABGLOM >60.0 05/12/2023 05:57 AM        Radiology:  I have reviewed imaging results per electronic record and most pertinent abnormalities are being addressed from an infectious disease standpoint. ASSESSMENT:  Acute mental status changes  Fever  Bacteremia    For bacteremia gram-positive rods can suggest contamination work-up, if there is a GI focus like gallbladder this could explain it, sludging? Jero Verduzco HIDA negative Possible early pneumonia. PLAN:  GIven degree of illness at presentation was planning on leaving on zosyn , as there may another more subclinical source.  He is
[facility] OCCUPATIONAL THERAPY EVALUATION - ACUTE     NAME: Seb Parra  : 1932 (80 y.o.)  MRN: [id number]  CODE STATUS: Full Code  Room: Z70B324-42    Date of Service: 5/10/2023    Patient Diagnosis(es): [facility] [A41.9]  Hypoxia [R09.02]  Gallstones and inflammation of gallbladder without obstruction [K80.00]  Sepsis ([facility] [A41.9]  Dementia, unspecified dementia severity, unspecified dementia type, unspecified whether behavioral, psychotic, or mood disturbance or anxiety ([facility] [F03.90]   Patient Active Problem List    Diagnosis Date Noted    Community acquired pneumonia due to Mycoplasma pneumoniae 2023    Claudication of gluteal region ([facility] 2019    Dyslipidemia 2018    Mitral valve insufficiency 2018    Nonrheumatic aortic valve stenosis 2018    Stenosis of carotid artery 2017    Sepsis ([facility] 2023        Past Medical History:   Diagnosis Date    Bilateral lower extremity edema     Carotid stenosis     Dementia (HCC)     Diastolic CHF, acute on chronic (HCC)     mitral stenosis    FH: carotid endarterectomy     Hiatal hernia     Hypoxia     Loss of coordination     Muscle weakness     Pharyngeal dysphagia     Pneumonia      History reviewed. No pertinent surgical history. Restrictions  Restrictions/Precautions: Fall Risk, Up as Tolerated       Safety Devices: Safety Devices  Type of Devices:  All fall risk precautions in place     Patient's date of birth confirmed: Yes    General:  Chart Reviewed: Yes  Patient assessed for rehabilitation services?: Yes    Subjective  Subjective: \"I feel good today\"       Pain at start of treatment: No    Pain at end of treatment: No    Location:   Description:   Nursing notified: Not Applicable  RN:   Intervention: None    Prior Level of Function:  Social/Functional History  Lives With: Spouse  Type of Home: Assisted living  Home Layout: One level  Home Access: Level entry  Bathroom Shower/Tub: Tub/Shower
[facility]   [facility]  Speech Language Pathology  Clinical Bedside Swallow Evaluation    NAME:Navarro Eubanks  : 1932 (80 y.o.)   [x]   confirmed    MRN: [id number]  ROOM: [place] [address]  ADMISSION DATE: 2023  PATIENT DIAGNOSIS(ES): Septicemia ([address] [A41.9]  Hypoxia [R09.02]  Gallstones and inflammation of gallbladder without obstruction [K80.00]  Sepsis ([address] [A41.9]  Dementia, unspecified dementia severity, unspecified dementia type, unspecified whether behavioral, psychotic, or mood disturbance or anxiety ([address] [F03.90]  Chief Complaint   Patient presents with    Fatigue     Per report  pt has gen. Weakness/illness , fever x 1 week     Patient Active Problem List    Diagnosis Date Noted    Community acquired pneumonia due to Mycoplasma pneumoniae 2023    Claudication of gluteal region [facility]) 2019    Dyslipidemia 2018    Mitral valve insufficiency 2018    Nonrheumatic aortic valve stenosis 2018    Stenosis of carotid artery 2017    Sepsis ([address] 2023     Past Medical History:   Diagnosis Date    Bilateral lower extremity edema     Carotid stenosis     Dementia (HCC)     Diastolic CHF, acute on chronic (HCC)     mitral stenosis    FH: carotid endarterectomy     Hiatal hernia     Hypoxia     Loss of coordination     Muscle weakness     Pharyngeal dysphagia     Pneumonia      History reviewed. No pertinent surgical history. No Known Allergies    DATE ONSET: 2023    Date of Evaluation: 5/10/2023   Evaluating Therapist: Odessa Jacobo SLP    Dysphagia Diagnosis  Dysphagia Diagnosis: Mild to moderate oral stage dysphagia; Suspected needs further assessment  Dysphagia Impression : Mild to moderate oral dysphagia was noted characterized by lingual pumping, increased time and effort, suspected premature pharyngeal entry, trace residue along the tongue and discoordination. Oral holding was noted intermittently during the BSE.  Patient
[facility]  [facility]  Speech Language Pathology   Treatment Note      Digna Grewal  1932  [id number]  [x]   confirmed      Date: 2023    Septicemia ([facility] [A41.9]  Hypoxia [R09.02]  Gallstones and inflammation of gallbladder without obstruction [K80.00]  Sepsis ([facility] [A41.9]  Dementia, unspecified dementia severity, unspecified dementia type, unspecified whether behavioral, psychotic, or mood disturbance or anxiety ([facility] [F03.90]    Restrictions/Precautions: Fall Risk, Up as Tolerated    Weight - Scale: 199 lb 11.2 oz (90.6 kg)     ADULT ORAL NUTRITION SUPPLEMENT; Breakfast, Lunch, Dinner; Frozen Oral Supplement  ADULT DIET; Dysphagia - Minced and Moist; Mildly Thick (Nectar)    SpO2: 97 % (23)  O2 Flow Rate (L/min): 2 L/min (05/10/23 0826)  No active isolations      Subjective:  Alert and Cooperative        Interventions used this date:  Therapeutic Meal Monitor      Objective/Assessment:  Patient progressing towards goals:  Goal 1: Pt will complete lingual ROM and strengthening exercises (lingual press, lingual pull backs) with 90% accuracy, in order to promote anterior to posterior propulsion of bolus and improve tongue base retraction. Not addressed  Goal 2: Pt will improve hyolaryngeal elevation by performing laryngeal exercises (effortful swallow, Mendelsohn maneuver, falsetto & effortful pitch glide) with 90% accuracy in order to strengthen and establish a more effective swallow. Not addressed  Goal 3: Pt will complete pharyngeal strengthening exercises (chin tuck against resistance, Shaker head lift, effortful swallow, Mendelsohn maneuver, Alessia, falsetto & effortful pitch glide, supraglottic swallow, super supraglottic swallow) with 90% accuracy in order to strengthen and establish and more effective swallow.   Not addressed  Goal 4: Pt will tolerate thermal stimulation of anterior faucial pillars followed by nectar thick bolus in all
Nursing report received from first shift nurse. Night shift assessment completed pt vital signs stable. Pt saturation 100% on 2 L NC. Pt in bed resting. Wearing O2 via nasal canula. Bed in lowest position. Side rails x2, call light in reach.  Nurse will monitor Pt throughout the night
[facility] Occupational Therapy      Date: 2023  Patient Name: Farheen Mclaughlin        MRN: [id number]  Account: [de-identified]   : 1932  (80 y.o.)  Room: [place] [address]    Chart reviewed, attempted OT at [phone]. Patient not seen 2° to:    Pt in bed sleeping upon arrival. Pt easily wakes, however, has difficulty keeping his eyes open. Pt declined ADL reporting that he already completed today. Pt declined to participate in BUE exercises. Pt having difficulty keeping eyes open. Pt too lethargic to participate in therapy at this time. Will attempt again when able.     Electronically signed by RACHAEL Lam on 2023 at 1:09 PM
Physical Therapy Med Surg Daily Treatment Note  [facility]/Department[facility]  [id number]       NAME: Fatimah Rob  : 1932 (80 y.o.)  MRN: [id number]  CODE STATUS: Full Code    Date of Service: 2023    Patient Diagnosis(es): [facility] ([facility] [A41.9]  Hypoxia [R09.02]  Gallstones and inflammation of gallbladder without obstruction [K80.00]  Sepsis ([facility] [A41.9]  Dementia, unspecified dementia severity, unspecified dementia type, unspecified whether behavioral, psychotic, or mood disturbance or anxiety ([facility] [F03.90]   Chief Complaint   Patient presents with    Fatigue     Per report  pt has gen. Weakness/illness , fever x 1 week     Patient Active Problem List    Diagnosis Date Noted    Community acquired pneumonia due to Mycoplasma pneumoniae 2023    Claudication of gluteal region [facility]) 2019    Dyslipidemia 2018    Mitral valve insufficiency 2018    Nonrheumatic aortic valve stenosis 2018    Stenosis of carotid artery 2017    Dementia ([facility] 2023    Acute alteration in mental status 2023    Bacteremia 2023    Sepsis ([facility] 2023        Past Medical History:   Diagnosis Date    Bilateral lower extremity edema     Carotid stenosis     Dementia (HCC)     Diastolic CHF, acute on chronic (HCC)     mitral stenosis    FH: carotid endarterectomy     Hiatal hernia     Hypoxia     Loss of coordination     Muscle weakness     Pharyngeal dysphagia     Pneumonia      History reviewed. No pertinent surgical history. Chart Reviewed: Yes    Restrictions:  Restrictions/Precautions: Fall Risk;Up as Tolerated    SUBJECTIVE:   Subjective: \"I dont want to get up\"    Pain  Pain: Denies pre and post session pain.     OBJECTIVE:        Bed mobility  Rolling to Left: Moderate assistance;2 Person assistance;Maximum assistance  Rolling to Right: Moderate assistance;Maximum assistance;2 Person assistance  Supine to Sit: Moderate assistance;2 Person
Physical Therapy Med Surg Initial Assessment  Facility/Department: [facility]  Room: [place] [id number]       NAME: Raciel Ayala  : 1932 (80 y.o.)  MRN: [id number]  CODE STATUS: Full Code    Date of Service: 5/10/2023    Patient Diagnosis(es): Septicemia ([facility] [A41.9]  Hypoxia [R09.02]  Gallstones and inflammation of gallbladder without obstruction [K80.00]  Sepsis ([facility] [A41.9]  Dementia, unspecified dementia severity, unspecified dementia type, unspecified whether behavioral, psychotic, or mood disturbance or anxiety ([facility] [F03.90]   Chief Complaint   Patient presents with    Fatigue     Per report  pt has gen. Weakness/illness , fever x 1 week     Patient Active Problem List    Diagnosis Date Noted    Community acquired pneumonia due to Mycoplasma pneumoniae 2023    Claudication of gluteal region [facility]) 2019    Dyslipidemia 2018    Mitral valve insufficiency 2018    Nonrheumatic aortic valve stenosis 2018    Stenosis of carotid artery 2017    Sepsis ([facility] 2023        Past Medical History:   Diagnosis Date    Bilateral lower extremity edema     Carotid stenosis     Dementia (HCC)     Diastolic CHF, acute on chronic (HCC)     mitral stenosis    FH: carotid endarterectomy     Hiatal hernia     Hypoxia     Loss of coordination     Muscle weakness     Pharyngeal dysphagia     Pneumonia      History reviewed. No pertinent surgical history. Chart Reviewed: Yes  Patient assessed for rehabilitation services?: Yes  Family / Caregiver Present: Yes (dtr and spouse)  Diagnosis: Sepsis ([facility]  General Comment  Comments: Pt resting in bed - agreeable to PT evaluation    Restrictions:  Restrictions/Precautions: Fall Risk, Up as Tolerated     SUBJECTIVE:   Subjective: \"i don't do good sitting on the side of the bed. \"    Pain  Pain: Denies pre and post session pain.     Prior Level of Function:  Social/Functional History  Lives With: Spouse  Type of Home: Assisted living  Home
Physical Therapy Missed Treatment   Facility/Department: [facility] MED SURG D748/F468-15    NAME: aKlani Anderson    : 1932 (80 y.o.)  MRN: [id number]    Account: [de-identified]  Gender: male    Chart reviewed, attempted PT at 13:19. Patient unavailable 2° to:      [x] Pt. . off floor for test/procedure. Swallow test with video. Will attempt PT treatment again at earliest convenience.       Electronically signed by Evangelina Brooks PTA on 23 at 1:56 PM EDT
Pt arrived to [address] Annabel Woodson to see pt. Pt A&O to self. Pt vital signs temp 98.8 /59 HR 91 Resp 28 O2sat 98% on 2L NC. Meplex applied to heals. Meplex applied to left lower leg due to weeping. +4 bliateral lower extremity edema. Pt also has some scabs to right lower extremity. Pt also has RUQ tenderness. Pt oriented to room and call light. Call light in reach. Pt will remain NPO    Perfect serve sent to Dr. Annabel Woodson unable to obtain UA due to no output was given okay per Dr. Annabel Woodson to bladder scan pt if more then 300ml in bladder per Dr. Annabel Woodson okay to straight cath pt. Pt bladder scanned had 190ml in bladder.  Did not have to straight cath pt was able to obtain urine specimen
Pt d/c'd to [facility] via [employer]. Pt is at baseline per shift change report 15 minutes prior to . Pt is calm, no sob or acute distress noted. Vitals obtained at d/c and are stable. Pt sent with all belongings. Report was called to NH by previous nurse. [facility] aware of d/c.
Pulmonary Disorder  (acute or chronic)  []   Severe or Chronic w/ Exacerbation  []     Surgical Status No [x]   Surgeries     General []   Surgery Lower []   Abdominal Thoracic or []   Upper Abdominal Thoracic with  PulmonaryDisorder  []     Chest X-ray Clear/Not  Ordered     []  Chronic Changes  Results Pending  []  Infiltrates, atelectasis, pleural effusion, or edema  [x]  Infiltrates in more than one lobe []  Infiltrate + Atelectasis, &/or pleural effusion  []    Respiratory Pattern Regular,  RR = 12-20 [x]  Increased,  RR = 21-25 []  NUNEZ, irregular,  or RR = 26-30 []  Decreased FEV1  or RR = 31-35 []  Severe SOB, use  of accessory muscles, or RR ? 35  []    Mental Status Alert, oriented,  Cooperative [x]  Confused but Follows commands []  Lethargic or unable to follow commands []  Obtunded  []  Comatose  []    Breath Sounds Clear to  auscultation  [x]  Decreased unilaterally or  in bases only []  Decreased  bilaterally  []  Crackles or intermittent wheezes []  Wheezes []    Cough Strong, Spontan., & nonproductive [x]  Strong,  spontaneous, &  productive []  Weak,  Nonproductive []  Weak, productive or  with wheezes []  No spontaneous  cough or may require suctioning []    Level of Activity Ambulatory []  Ambulatory w/ Assist  [x]  Non-ambulatory []  Paraplegic []  Quadriplegic []    Total    Score:____3___     Triage Score:___5_____      Tri       Triage:     1. (>20) Freq: Q3    2. (16-20) Freq: Q4   3. (11-15) Freq: QID & Albuterol Q2 PRN    4. (6-10) Freq: TID & Albuterol Q2 PRN    5. (0-5) Freq Q4prn

## 2023-05-12 NOTE — CARE COORDINATION
[facility] scheduled for tonight at 6:00. Nurse and patient's daughter informed. Second iMM reviewed over the phone with daughter.

## 2023-05-12 NOTE — PLAN OF CARE
Problem: Discharge Planning  Goal: Discharge to home or other facility with appropriate resources  5/12/2023 1613 by Mansi Fuchs RN  Outcome: [address] Resolved Met     Problem: Skin/Tissue Integrity  Goal: Absence of new skin breakdown  Description: 1. Monitor for areas of redness and/or skin breakdown  2. Assess vascular access sites hourly  3. Every 4-6 hours minimum:  Change oxygen saturation probe site  4. Every 4-6 hours:  If on nasal continuous positive airway pressure, respiratory therapy assess nares and determine need for appliance change or resting period. 5/12/2023 1613 by Mansi Fuchs RN  Outcome: [address] Resolved Met     Problem: ABCDS Injury Assessment  Goal: Absence of physical injury  5/12/2023 1613 by Mansi Fuchs RN  Outcome: [address] Resolved Met     Problem: Safety - Adult  Goal: Free from fall injury  5/12/2023 1613 by Mansi Fuchs RN  Outcome: [address] Resolved Met     Problem: Chronic Conditions and Co-morbidities  Goal: Patient's chronic conditions and co-morbidity symptoms are monitored and maintained or improved  5/12/2023 1613 by Mansi Fuchs RN  Outcome: [address] Resolved Met     Problem: Nutrition Deficit:  Goal: Optimize nutritional status  5/12/2023 1613 by Mansi Fuchs RN  Outcome: [address] Resolved Met  5/12/2023 0847 by Rebecca Vargas, MARLYN, LD  Flowsheets (Taken 5/12/2023 4214)  Nutrient intake appropriate for improving, restoring, or maintaining nutritional needs:   Assess nutritional status and recommend course of action   Monitor oral intake, labs, and treatment plans   Recommend appropriate diets, oral nutritional supplements, and vitamin/mineral supplements   Pt discharged home to [facility]. All Care plan goals have been met .

## 2023-05-12 NOTE — DISCHARGE INSTR - DIET
Good nutrition is important when healing from an illness, injury, or surgery. Follow any nutrition recommendations given to you during your hospital stay. If you were given an oral nutrition supplement while in the hospital, continue to take this supplement at home. You can take it with meals, in-between meals, and/or before bedtime. These supplements can be purchased at most local grocery stores, pharmacies, and chain [employer]-stores. If you have any questions about your diet or nutrition, call the hospital and ask for the dietitian.       Pureed diet with (moderate) honey thick liquid

## 2023-05-12 NOTE — PLAN OF CARE
Nutrition Problem #1: Predicted inadequate energy intake  Intervention: Food and/or Nutrient Delivery: Modify Current Diet, Start Oral Nutrition Supplement  Nutritional

## 2023-05-14 LAB — BACTERIA BLD CULT: NORMAL

## 2023-05-17 ENCOUNTER — OFFICE VISIT (OUTPATIENT)
Dept: GERIATRIC MEDICINE | Age: 88
End: 2023-05-17
Payer: MEDICARE

## 2023-05-17 DIAGNOSIS — Z87.19 HX OF ACUTE CHOLECYSTITIS: Primary | ICD-10-CM

## 2023-05-17 DIAGNOSIS — R60.0 BILATERAL EDEMA OF LOWER EXTREMITY: ICD-10-CM

## 2023-05-17 DIAGNOSIS — Z86.19 HX OF SEPSIS: ICD-10-CM

## 2023-05-17 PROCEDURE — 1123F ACP DISCUSS/DSCN MKR DOCD: CPT | Performed by: PHYSICIAN ASSISTANT

## 2023-05-17 PROCEDURE — 99344 HOME/RES VST NEW MOD MDM 60: CPT | Performed by: PHYSICIAN ASSISTANT

## 2023-05-19 ENCOUNTER — HOSPITAL ENCOUNTER (OUTPATIENT)
Dept: ULTRASOUND IMAGING | Age: 88
End: 2023-05-19
Payer: MEDICARE

## 2023-05-19 DIAGNOSIS — Z91.89 AT RISK FOR DEEP VENOUS THROMBOSIS: ICD-10-CM

## 2023-05-19 DIAGNOSIS — R60.0 BILATERAL EDEMA OF LOWER EXTREMITY: ICD-10-CM

## 2023-05-19 DIAGNOSIS — I73.9 INTERMITTENT CLAUDICATION (HCC): ICD-10-CM

## 2023-05-19 PROCEDURE — 93923 UPR/LXTR ART STDY 3+ LVLS: CPT

## 2023-05-19 PROCEDURE — 93923 UPR/LXTR ART STDY 3+ LVLS: CPT | Performed by: INTERNAL MEDICINE

## 2023-05-26 DIAGNOSIS — E78.2 MIXED HYPERLIPIDEMIA: ICD-10-CM

## 2023-05-26 DIAGNOSIS — R73.02 GLUCOSE INTOLERANCE (IMPAIRED GLUCOSE TOLERANCE): ICD-10-CM

## 2023-05-26 DIAGNOSIS — E55.9 VITAMIN D DEFICIENCY: Primary | ICD-10-CM

## 2023-05-26 PROBLEM — I35.0 AORTIC VALVE STENOSIS: Status: ACTIVE | Noted: 2018-12-01

## 2023-05-26 PROBLEM — I34.0 MITRAL VALVE INSUFFICIENCY: Status: ACTIVE | Noted: 2018-12-01

## 2023-05-26 PROBLEM — R60.9 EDEMA: Status: ACTIVE | Noted: 2023-05-26

## 2023-05-26 PROBLEM — L85.3 ASTEATOSIS CUTIS: Status: ACTIVE | Noted: 2023-05-26

## 2023-05-26 PROBLEM — E78.5 HYPERLIPEMIA: Status: ACTIVE | Noted: 2023-05-26

## 2023-05-26 PROBLEM — R41.82 ACUTE ALTERATION IN MENTAL STATUS: Status: ACTIVE | Noted: 2023-05-11

## 2023-05-26 PROBLEM — M20.5X9: Status: ACTIVE | Noted: 2023-05-26

## 2023-05-26 PROBLEM — I99.8 VASCULAR INSUFFICIENCY: Status: ACTIVE | Noted: 2023-05-26

## 2023-05-26 PROBLEM — F03.90 DEMENTIA (MULTI): Status: ACTIVE | Noted: 2023-05-11

## 2023-05-26 PROBLEM — M41.30 SCOLIOSIS, THORACOGENIC: Status: ACTIVE | Noted: 2023-05-26

## 2023-05-26 PROBLEM — H91.90 HEARING LOSS: Status: ACTIVE | Noted: 2023-05-26

## 2023-05-26 PROBLEM — K80.00 CHOLELITHIASIS WITH ACUTE CHOLECYSTITIS WITHOUT OBSTRUCTION: Status: ACTIVE | Noted: 2023-05-26

## 2023-05-26 PROBLEM — M47.814 SPONDYLOSIS OF THORACIC REGION WITHOUT MYELOPATHY OR RADICULOPATHY: Status: ACTIVE | Noted: 2023-05-26

## 2023-05-26 PROBLEM — I50.30 DIASTOLIC HEART FAILURE (MULTI): Status: ACTIVE | Noted: 2023-05-26

## 2023-05-26 PROBLEM — I73.9: Status: ACTIVE | Noted: 2019-07-03

## 2023-05-26 RX ORDER — IPRATROPIUM BROMIDE AND ALBUTEROL SULFATE 2.5; .5 MG/3ML; MG/3ML
3 SOLUTION RESPIRATORY (INHALATION)
COMMUNITY
Start: 2023-01-23

## 2023-05-26 RX ORDER — GUAIFENESIN 600 MG/1
TABLET, EXTENDED RELEASE ORAL EVERY 12 HOURS
COMMUNITY
Start: 2023-01-31

## 2023-05-26 RX ORDER — METOPROLOL SUCCINATE 25 MG/1
1 TABLET, EXTENDED RELEASE ORAL DAILY
COMMUNITY
Start: 2019-05-03

## 2023-05-26 RX ORDER — ENOXAPARIN SODIUM 100 MG/ML
40 INJECTION SUBCUTANEOUS
COMMUNITY
Start: 2023-05-09

## 2023-05-26 RX ORDER — NAPROXEN SODIUM 220 MG/1
81 TABLET, FILM COATED ORAL DAILY
COMMUNITY
Start: 2023-01-24

## 2023-05-26 RX ORDER — ISOSORBIDE MONONITRATE 30 MG/1
30 TABLET, EXTENDED RELEASE ORAL DAILY
COMMUNITY
Start: 2023-01-24

## 2023-05-26 RX ORDER — PRAVASTATIN SODIUM 80 MG/1
1 TABLET ORAL DAILY
COMMUNITY
Start: 2020-07-27

## 2023-05-26 RX ORDER — TORSEMIDE 20 MG/1
TABLET ORAL
COMMUNITY
Start: 2023-01-23

## 2023-05-31 ENCOUNTER — OFFICE VISIT (OUTPATIENT)
Dept: GERIATRIC MEDICINE | Age: 88
End: 2023-05-31

## 2023-05-31 DIAGNOSIS — R09.89 CHEST CONGESTION: ICD-10-CM

## 2023-05-31 DIAGNOSIS — R60.0 LOWER LEG EDEMA: ICD-10-CM

## 2023-05-31 DIAGNOSIS — I34.0 MITRAL VALVE INSUFFICIENCY, UNSPECIFIED ETIOLOGY: Primary | ICD-10-CM

## 2023-05-31 DIAGNOSIS — F03.C0 SEVERE DEMENTIA WITHOUT BEHAVIORAL DISTURBANCE, PSYCHOTIC DISTURBANCE, MOOD DISTURBANCE, OR ANXIETY, UNSPECIFIED DEMENTIA TYPE (HCC): ICD-10-CM

## 2023-06-02 LAB
BASOPHILS # BLD: 0.1 K/UL (ref 0–0.2)
BASOPHILS NFR BLD: 0.6 %
BNP BLD-MCNC: 494 PG/ML
EOSINOPHIL # BLD: 0.4 K/UL (ref 0–0.7)
EOSINOPHIL NFR BLD: 4.8 %
ERYTHROCYTE [DISTWIDTH] IN BLOOD BY AUTOMATED COUNT: 14.9 % (ref 11.5–14.5)
HCT VFR BLD AUTO: 37.4 % (ref 42–52)
HGB BLD-MCNC: 12.8 G/DL (ref 14–18)
LYMPHOCYTES # BLD: 1.6 K/UL (ref 1–4.8)
LYMPHOCYTES NFR BLD: 19.8 %
MCH RBC QN AUTO: 30.3 PG (ref 27–31.3)
MCHC RBC AUTO-ENTMCNC: 34.2 % (ref 33–37)
MCV RBC AUTO: 88.6 FL (ref 79–92.2)
MONOCYTES # BLD: 0.9 K/UL (ref 0.2–0.8)
MONOCYTES NFR BLD: 11 %
NEUTROPHILS # BLD: 5.3 K/UL (ref 1.4–6.5)
NEUTS SEG NFR BLD: 63.8 %
PLATELET # BLD AUTO: 216 K/UL (ref 130–400)
RBC # BLD AUTO: 4.22 M/UL (ref 4.7–6.1)
WBC # BLD AUTO: 8.3 K/UL (ref 4.8–10.8)

## 2023-06-09 ENCOUNTER — OFFICE VISIT (OUTPATIENT)
Dept: GERIATRIC MEDICINE | Age: 88
End: 2023-06-09
Payer: MEDICARE

## 2023-06-09 DIAGNOSIS — R60.0 LOCALIZED EDEMA: ICD-10-CM

## 2023-06-09 DIAGNOSIS — F03.C0 SEVERE DEMENTIA WITHOUT BEHAVIORAL DISTURBANCE, PSYCHOTIC DISTURBANCE, MOOD DISTURBANCE, OR ANXIETY, UNSPECIFIED DEMENTIA TYPE (HCC): ICD-10-CM

## 2023-06-09 DIAGNOSIS — I50.32 CHRONIC DIASTOLIC HEART FAILURE (HCC): Primary | ICD-10-CM

## 2023-06-09 DIAGNOSIS — Z91.89 AT RISK FOR ASPIRATION PNEUMONIA: ICD-10-CM

## 2023-06-09 PROCEDURE — 1123F ACP DISCUSS/DSCN MKR DOCD: CPT | Performed by: PHYSICIAN ASSISTANT

## 2023-06-09 PROCEDURE — 99348 HOME/RES VST EST LOW MDM 30: CPT | Performed by: PHYSICIAN ASSISTANT

## 2023-06-10 PROBLEM — I50.30 DIASTOLIC HEART FAILURE (HCC): Status: ACTIVE | Noted: 2023-05-26

## 2023-06-10 PROBLEM — E55.9 VITAMIN D DEFICIENCY: Status: ACTIVE | Noted: 2023-05-26

## 2023-06-10 PROBLEM — K80.00 CHOLELITHIASIS WITH ACUTE CHOLECYSTITIS WITHOUT OBSTRUCTION: Status: ACTIVE | Noted: 2023-05-26

## 2023-06-10 PROBLEM — L85.3 ASTEATOSIS CUTIS: Status: ACTIVE | Noted: 2023-05-26

## 2023-06-10 PROBLEM — R60.9 EDEMA: Status: ACTIVE | Noted: 2023-05-26

## 2023-06-10 PROBLEM — M41.30 SCOLIOSIS, THORACOGENIC: Status: ACTIVE | Noted: 2023-05-26

## 2023-06-10 PROBLEM — I99.8 VASCULAR INSUFFICIENCY: Status: ACTIVE | Noted: 2023-05-26

## 2023-06-10 PROBLEM — R73.02 GLUCOSE INTOLERANCE (IMPAIRED GLUCOSE TOLERANCE): Status: ACTIVE | Noted: 2023-05-26

## 2023-06-10 PROBLEM — M20.5X9: Status: ACTIVE | Noted: 2023-05-26

## 2023-06-10 PROBLEM — M47.814 SPONDYLOSIS OF THORACIC REGION WITHOUT MYELOPATHY OR RADICULOPATHY: Status: ACTIVE | Noted: 2023-05-26

## 2023-06-10 PROBLEM — H91.90 HEARING LOSS: Status: ACTIVE | Noted: 2023-05-26

## 2023-06-10 PROBLEM — E78.5 HYPERLIPEMIA: Status: ACTIVE | Noted: 2023-05-26

## 2023-06-10 NOTE — PROGRESS NOTES
FH: carotid endarterectomy     Hiatal hernia     Hypoxia     Loss of coordination     Muscle weakness     Pharyngeal dysphagia     Pneumonia      No past surgical history on file. Social History     Socioeconomic History    Marital status:      Spouse name: Not on file    Number of children: Not on file    Years of education: Not on file    Highest education level: Not on file   Occupational History    Not on file   Tobacco Use    Smoking status: Never    Smokeless tobacco: Never   Vaping Use    Vaping Use: Never used   Substance and Sexual Activity    Alcohol use: Never    Drug use: Never    Sexual activity: Not on file   Other Topics Concern    Not on file   Social History Narrative    Not on file     Social Determinants of Health     Financial Resource Strain: Not on file   Food Insecurity: Not on file   Transportation Needs: Not on file   Physical Activity: Not on file   Stress: Not on file   Social Connections: Not on file   Intimate Partner Violence: Not on file   Housing Stability: Not on file     No family history on file. No Known Allergies        Review of Systems   Unable to perform ROS: Dementia   All other systems reviewed and are negative. Objective:   VS: See [address] Reviewed. Physical Exam  Vitals (See BRANDEN) reviewed. Constitutional:       General: He is not in acute distress. Appearance: Normal appearance. He is normal weight. He is not ill-appearing. Comments: Pleasant; confused   HENT:      Head: Normocephalic and atraumatic. Nose: No congestion. Mouth/Throat:      Mouth: Mucous membranes are moist.      Pharynx: Oropharynx is clear. Eyes:      Conjunctiva/sclera: Conjunctivae normal.   Cardiovascular:      Rate and Rhythm: Normal rate and regular rhythm. Pulses: Normal pulses. Heart sounds: Murmur (aortic stenosis; mitral valve insufficiency) heard. Pulmonary:      Effort: Pulmonary effort is normal. No respiratory distress. Breath sounds:  No

## 2023-06-16 ENCOUNTER — APPOINTMENT (OUTPATIENT)
Dept: PRIMARY CARE | Facility: CLINIC | Age: 88
End: 2023-06-16
Payer: MEDICARE

## 2023-06-21 ENCOUNTER — OFFICE VISIT (OUTPATIENT)
Dept: GERIATRIC MEDICINE | Age: 88
End: 2023-06-21
Payer: MEDICARE

## 2023-06-21 DIAGNOSIS — R13.12 OROPHARYNGEAL DYSPHAGIA: ICD-10-CM

## 2023-06-21 DIAGNOSIS — R60.0 BILATERAL EDEMA OF LOWER EXTREMITY: ICD-10-CM

## 2023-06-21 DIAGNOSIS — Z91.89 AT RISK FOR ASPIRATION: ICD-10-CM

## 2023-06-21 DIAGNOSIS — I50.32 CHRONIC DIASTOLIC HEART FAILURE (HCC): Primary | ICD-10-CM

## 2023-06-21 PROCEDURE — 99348 HOME/RES VST EST LOW MDM 30: CPT | Performed by: PHYSICIAN ASSISTANT

## 2023-06-21 PROCEDURE — 1123F ACP DISCUSS/DSCN MKR DOCD: CPT | Performed by: PHYSICIAN ASSISTANT

## 2023-06-21 NOTE — PROGRESS NOTES
Subjective:      Patient ID: Ivon Davila is a pleasant 80 y.o. male who presents today for:  No chief complaint on file. [facility]    Follow-up on patient history of CHF,? Pneumonia,? Aspiration/dysphagia. Patient family/POA was present today giving good history. Patient did visit with cardiology. Discussed no new additional diuretic due to patient's hypotension episodes. Risk of falls. Patient overall improving. His lower leg weeping bullae have moderately improved, nearing resolution. We will continue wound care orders per [facility] (see orders). No additional changes to be made today. Continue monitoring weekly basis. Pain with a out of town for the next few weeks, will continue monitoring in the interim. No new dysphagia or aspiration complaints. Intermittent issues with kitchen being appropriate foods, mitigated with nursing intervention and wife intervention.       Patient Active Problem List   Diagnosis    Community acquired pneumonia due to Mycoplasma pneumoniae    Claudication of gluteal region [facility])    Dyslipidemia    Mitral valve insufficiency    Nonrheumatic aortic valve stenosis    Stenosis of carotid artery    Sepsis ([facility]    Dementia ([facility]    Acute alteration in mental status    Bacteremia    Asteatosis cutis    Cholelithiasis with acute cholecystitis without obstruction    Clubbed toes    Diastolic heart failure (HCC)    Edema    Glucose intolerance (impaired glucose tolerance)    Hearing loss    Hyperlipemia    Scoliosis, thoracogenic    Spondylosis of thoracic region without myelopathy or radiculopathy    Vascular insufficiency    Vitamin D deficiency     Past Medical History:   Diagnosis Date    Bilateral lower extremity edema     Carotid stenosis     Dementia (HCC)     Diastolic CHF, acute on chronic (HCC)     mitral stenosis    FH: carotid endarterectomy     Hiatal hernia     Hypoxia     Loss of coordination     Muscle weakness     Pharyngeal dysphagia

## 2023-06-28 ENCOUNTER — OFFICE VISIT (OUTPATIENT)
Dept: GERIATRIC MEDICINE | Age: 88
End: 2023-06-28
Payer: MEDICARE

## 2023-06-28 DIAGNOSIS — F03.C0 SEVERE DEMENTIA WITHOUT BEHAVIORAL DISTURBANCE, PSYCHOTIC DISTURBANCE, MOOD DISTURBANCE, OR ANXIETY, UNSPECIFIED DEMENTIA TYPE (HCC): Primary | ICD-10-CM

## 2023-06-28 DIAGNOSIS — Z91.89 AT HIGH RISK FOR ASPIRATION: ICD-10-CM

## 2023-06-28 DIAGNOSIS — R13.12 OROPHARYNGEAL DYSPHAGIA: ICD-10-CM

## 2023-06-28 DIAGNOSIS — R05.9 COUGH IN ADULT: ICD-10-CM

## 2023-06-28 PROCEDURE — G0317 PR PROLONG NURSIN FAC EVAL 15M: HCPCS | Performed by: PHYSICIAN ASSISTANT

## 2023-06-28 PROCEDURE — 1123F ACP DISCUSS/DSCN MKR DOCD: CPT | Performed by: PHYSICIAN ASSISTANT

## 2023-06-28 PROCEDURE — 99348 HOME/RES VST EST LOW MDM 30: CPT | Performed by: PHYSICIAN ASSISTANT

## 2023-07-02 LAB
ANION GAP SERPL CALCULATED.3IONS-SCNC: 8 MEQ/L (ref 9–15)
BUN SERPL-MCNC: 11 MG/DL (ref 8–23)
CALCIUM SERPL-MCNC: 8.6 MG/DL (ref 8.5–9.9)
CHLORIDE SERPL-SCNC: 99 MEQ/L (ref 95–107)
CO2 SERPL-SCNC: 31 MEQ/L (ref 20–31)
CREAT SERPL-MCNC: 0.87 MG/DL (ref 0.7–1.2)
ERYTHROCYTE [DISTWIDTH] IN BLOOD BY AUTOMATED COUNT: 15.2 % (ref 11.5–14.5)
GLUCOSE SERPL-MCNC: 136 MG/DL (ref 70–99)
HCT VFR BLD AUTO: 39 % (ref 42–52)
HGB BLD-MCNC: 13.2 G/DL (ref 14–18)
MCH RBC QN AUTO: 30.1 PG (ref 27–31.3)
MCHC RBC AUTO-ENTMCNC: 33.8 % (ref 33–37)
MCV RBC AUTO: 89 FL (ref 79–92.2)
PLATELET # BLD AUTO: 217 K/UL (ref 130–400)
POTASSIUM SERPL-SCNC: 4 MEQ/L (ref 3.4–4.9)
RBC # BLD AUTO: 4.38 M/UL (ref 4.7–6.1)
SODIUM SERPL-SCNC: 138 MEQ/L (ref 135–144)
WBC # BLD AUTO: 8.1 K/UL (ref 4.8–10.8)

## 2023-07-07 ENCOUNTER — OFFICE VISIT (OUTPATIENT)
Dept: GERIATRIC MEDICINE | Age: 88
End: 2023-07-07
Payer: MEDICARE

## 2023-07-07 DIAGNOSIS — I50.32 CHRONIC DIASTOLIC HEART FAILURE (HCC): Primary | ICD-10-CM

## 2023-07-07 DIAGNOSIS — Z87.19 HX OF CONSTIPATION: ICD-10-CM

## 2023-07-07 DIAGNOSIS — R10.9 ABDOMINAL DISCOMFORT: ICD-10-CM

## 2023-07-07 PROCEDURE — 99348 HOME/RES VST EST LOW MDM 30: CPT | Performed by: PHYSICIAN ASSISTANT

## 2023-07-07 PROCEDURE — 1123F ACP DISCUSS/DSCN MKR DOCD: CPT | Performed by: PHYSICIAN ASSISTANT

## 2023-07-12 NOTE — PROGRESS NOTES
Subjective:      Patient ID: Hayden Crigler is a pleasant 80 y.o. male who presents today for:  No chief complaint on file. [address]    Pt seen today for follow-up for recent productive cough difficulty swallowing. Patient has not increased risk for aspiration from phlegm as he has been producing more and having difficulty sequestering and swallowing. He has been had recent modified diet change for evidence of swallowing difficulty. Has been following diet closely. I did speak with daughter/POA and wife about conducting recurrent CXR/labs (I.e CBC / BNP) periodically to reassess due to patient labile chewing and swallowing. No new evidence of fever and/or vital sign changes at this time. Patient Active Problem List   Diagnosis    Community acquired pneumonia due to Mycoplasma pneumoniae    Claudication of gluteal region [facility])    Dyslipidemia    Mitral valve insufficiency    Nonrheumatic aortic valve stenosis    Stenosis of carotid artery    Sepsis ([address])    Dementia ([address])    Acute alteration in mental status    Bacteremia    Asteatosis cutis    Cholelithiasis with acute cholecystitis without obstruction    Clubbed toes    Diastolic heart failure (HCC)    Edema    Glucose intolerance (impaired glucose tolerance)    Hearing loss    Hyperlipemia    Scoliosis, thoracogenic    Spondylosis of thoracic region without myelopathy or radiculopathy    Vascular insufficiency    Vitamin D deficiency     Past Medical History:   Diagnosis Date    Bilateral lower extremity edema     Carotid stenosis     Dementia (HCC)     Diastolic CHF, acute on chronic (HCC)     mitral stenosis    FH: carotid endarterectomy     Hiatal hernia     Hypoxia     Loss of coordination     Muscle weakness     Pharyngeal dysphagia     Pneumonia      No past surgical history on file.   Social History     Socioeconomic History    Marital status:      Spouse name: Not on file    Number of children: Not on file

## 2023-07-19 ENCOUNTER — OFFICE VISIT (OUTPATIENT)
Dept: GERIATRIC MEDICINE | Age: 88
End: 2023-07-19
Payer: MEDICARE

## 2023-07-19 DIAGNOSIS — F03.C0 SEVERE DEMENTIA WITHOUT BEHAVIORAL DISTURBANCE, PSYCHOTIC DISTURBANCE, MOOD DISTURBANCE, OR ANXIETY, UNSPECIFIED DEMENTIA TYPE (HCC): ICD-10-CM

## 2023-07-19 DIAGNOSIS — R13.10 DYSPHAGIA, UNSPECIFIED TYPE: ICD-10-CM

## 2023-07-19 DIAGNOSIS — I50.32 CHRONIC DIASTOLIC HEART FAILURE (HCC): Primary | ICD-10-CM

## 2023-07-19 DIAGNOSIS — I99.8 VASCULAR INSUFFICIENCY: ICD-10-CM

## 2023-07-19 PROCEDURE — 99347 HOME/RES VST EST SF MDM 20: CPT | Performed by: PHYSICIAN ASSISTANT

## 2023-07-19 PROCEDURE — 1123F ACP DISCUSS/DSCN MKR DOCD: CPT | Performed by: PHYSICIAN ASSISTANT

## 2023-07-20 NOTE — PROGRESS NOTES
Subjective:      Patient ID: Marcia Quiroz is a pleasant 80 y.o. male who presents today for:  No chief complaint on file. [address]    Following up with patient today for recent stat KUB and CMP/CBC results. Labs overall within normal limits. No new changes. Patient KUB within normal limits. Normal BMs, albeit a bit slower than preferred. Utilize stool softeners as needed. Lung sounds about the same, very mild crackles to bilateral bases. Vital signs have been stable however per nurse. We will continue current care plan and monitor on as needed basis when in facility. Patient continues to get some inappropriate food textures at mealtimes, nursing staff working to combat. Patient Active Problem List   Diagnosis    Community acquired pneumonia due to Mycoplasma pneumoniae    Claudication of gluteal region [facility])    Dyslipidemia    Mitral valve insufficiency    Nonrheumatic aortic valve stenosis    Stenosis of carotid artery    Sepsis ([address])    Dementia ([address])    Acute alteration in mental status    Bacteremia    Asteatosis cutis    Cholelithiasis with acute cholecystitis without obstruction    Clubbed toes    Diastolic heart failure (HCC)    Edema    Glucose intolerance (impaired glucose tolerance)    Hearing loss    Hyperlipemia    Scoliosis, thoracogenic    Spondylosis of thoracic region without myelopathy or radiculopathy    Vascular insufficiency    Vitamin D deficiency     Past Medical History:   Diagnosis Date    Bilateral lower extremity edema     Carotid stenosis     Dementia (HCC)     Diastolic CHF, acute on chronic (HCC)     mitral stenosis    FH: carotid endarterectomy     Hiatal hernia     Hypoxia     Loss of coordination     Muscle weakness     Pharyngeal dysphagia     Pneumonia      No past surgical history on file.   Social History     Socioeconomic History    Marital status:      Spouse name: Not on file    Number of children: Not on file    Years of

## 2023-07-26 ENCOUNTER — OFFICE VISIT (OUTPATIENT)
Dept: GERIATRIC MEDICINE | Age: 88
End: 2023-07-26
Payer: MEDICARE

## 2023-07-26 DIAGNOSIS — Z91.89 AT RISK FOR ASPIRATION PNEUMONIA: ICD-10-CM

## 2023-07-26 DIAGNOSIS — I50.32 CHRONIC DIASTOLIC HEART FAILURE (HCC): Primary | ICD-10-CM

## 2023-07-26 DIAGNOSIS — I99.8 VASCULAR INSUFFICIENCY: ICD-10-CM

## 2023-07-26 DIAGNOSIS — R13.12 OROPHARYNGEAL DYSPHAGIA: ICD-10-CM

## 2023-07-26 PROCEDURE — 1123F ACP DISCUSS/DSCN MKR DOCD: CPT | Performed by: PHYSICIAN ASSISTANT

## 2023-07-26 PROCEDURE — 99348 HOME/RES VST EST LOW MDM 30: CPT | Performed by: PHYSICIAN ASSISTANT

## 2023-08-10 NOTE — PROGRESS NOTES
Subjective:      Patient ID: Keily Moreno is a pleasant 80 y.o. male who presents today for:  No chief complaint on file. [address]    Patient seen today for weekly check-in. No new evidence of worsening lower leg edema, lung sounds are clear to auscultation bilaterally today. Patient has had no new dysphagia or choking episodes per nurse and wife. Diet orders are more tightly adhered to per kitchen at this time and wife states no new reported issues. Overall patient stable at this point. Will f/u as needed. Patient Active Problem List   Diagnosis    Community acquired pneumonia due to Mycoplasma pneumoniae    Claudication of gluteal region [facility])    Dyslipidemia    Mitral valve insufficiency    Nonrheumatic aortic valve stenosis    Stenosis of carotid artery    Sepsis ([address])    Dementia ([address])    Acute alteration in mental status    Bacteremia    Asteatosis cutis    Cholelithiasis with acute cholecystitis without obstruction    Clubbed toes    Diastolic heart failure (HCC)    Edema    Glucose intolerance (impaired glucose tolerance)    Hearing loss    Hyperlipemia    Scoliosis, thoracogenic    Spondylosis of thoracic region without myelopathy or radiculopathy    Vascular insufficiency    Vitamin D deficiency     Past Medical History:   Diagnosis Date    Bilateral lower extremity edema     Carotid stenosis     Dementia (HCC)     Diastolic CHF, acute on chronic (HCC)     mitral stenosis    FH: carotid endarterectomy     Hiatal hernia     Hypoxia     Loss of coordination     Muscle weakness     Pharyngeal dysphagia     Pneumonia      No past surgical history on file.   Social History     Socioeconomic History    Marital status:      Spouse name: Not on file    Number of children: Not on file    Years of education: Not on file    Highest education level: Not on file   Occupational History    Not on file   Tobacco Use    Smoking status: Never    Smokeless tobacco: Never   Vaping

## 2023-08-11 DIAGNOSIS — F41.9 ANXIETY: Primary | ICD-10-CM

## 2023-08-11 RX ORDER — CLONAZEPAM 0.5 MG/1
1 TABLET ORAL NIGHTLY
Status: CANCELLED | OUTPATIENT
Start: 2023-08-11

## 2023-08-11 RX ORDER — CLONAZEPAM 0.5 MG/1
0.5 TABLET ORAL DAILY PRN
Status: CANCELLED | OUTPATIENT
Start: 2023-08-11

## 2023-08-16 ENCOUNTER — OFFICE VISIT (OUTPATIENT)
Dept: GERIATRIC MEDICINE | Age: 88
End: 2023-08-16
Payer: MEDICARE

## 2023-08-16 DIAGNOSIS — R60.0 BILATERAL LOWER EXTREMITY EDEMA: Primary | ICD-10-CM

## 2023-08-16 DIAGNOSIS — I87.2 VENOUS INSUFFICIENCY OF LEFT LEG: ICD-10-CM

## 2023-08-16 PROCEDURE — 99348 HOME/RES VST EST LOW MDM 30: CPT | Performed by: PHYSICIAN ASSISTANT

## 2023-08-16 PROCEDURE — 1123F ACP DISCUSS/DSCN MKR DOCD: CPT | Performed by: PHYSICIAN ASSISTANT

## 2023-08-21 ASSESSMENT — PATIENT HEALTH QUESTIONNAIRE - PHQ9
SUM OF ALL RESPONSES TO PHQ QUESTIONS 1-9: 0
SUM OF ALL RESPONSES TO PHQ9 QUESTIONS 1 & 2: 0
2. FEELING DOWN, DEPRESSED OR HOPELESS: 0
1. LITTLE INTEREST OR PLEASURE IN DOING THINGS: 0
DEPRESSION UNABLE TO ASSESS: FUNCTIONAL CAPACITY MOTIVATION LIMITS ACCURACY
SUM OF ALL RESPONSES TO PHQ QUESTIONS 1-9: 0

## 2023-08-21 NOTE — PROGRESS NOTES
on file    Highest education level: Not on file   Occupational History    Not on file   Tobacco Use    Smoking status: Never    Smokeless tobacco: Never   Vaping Use    Vaping Use: Never used   Substance and Sexual Activity    Alcohol use: Never    Drug use: Never    Sexual activity: Not on file   Other Topics Concern    Not on file   Social History Narrative    Not on file     Social Determinants of Health     Financial Resource Strain: Not on file   Food Insecurity: Not on file   Transportation Needs: Not on file   Physical Activity: Not on file   Stress: Not on file   Social Connections: Not on file   Intimate Partner Violence: Not on file   Housing Stability: Not on file     No family history on file. No Known Allergies        Review of Systems   Unable to perform ROS: Dementia   All other systems reviewed and are negative. Objective:   VS: See [facility]. Reviewed. Physical Exam  Vitals (See BRANDEN) reviewed. Constitutional:       General: He is not in acute distress. Appearance: Normal appearance. He is normal weight. He is not ill-appearing. Comments: Pleasant; confused   HENT:      Head: Normocephalic and atraumatic. Nose: No congestion. Eyes:      Conjunctiva/sclera: Conjunctivae normal.   Cardiovascular:      Rate and Rhythm: Normal rate and regular rhythm. Heart sounds: Murmur (aortic stenosis; mitral valve insufficiency) heard. Pulmonary:      Effort: Pulmonary effort is normal. No respiratory distress. Breath sounds: Wheezing (bilateral, minor mid lungs) present. Abdominal:      General: Bowel sounds are normal.      Palpations: Abdomen is soft. Musculoskeletal:         General: Normal range of motion. Right lower leg: Edema present. Left lower leg: Edema present. Skin:     General: Skin is warm and dry. Neurological:      Mental Status: He is alert. Mental status is at baseline. He is disoriented. Motor: Weakness present.    Psychiatric:         Mood

## 2023-08-22 NOTE — PROGRESS NOTES
Subjective:      Patient ID: Kavon Garcia is a pleasant 80 y.o. male who presents today for:  No chief complaint on file. [address]    Resident being seen today due to history of bilateral lower extremity edema, and this case with left lower weeping blisters with draining serous fluid. Patient has had a few occurrences of mild to moderate soaking of absorbent pads. Currently being changed twice a day, cleanse with normal saline dried and pad protect. Will need home health care orders for ongoing care. I will follow-up with changes in skin condition. Currently no evidence of acute cellulitis. Will monitor labs as needed. Will begin [address] as soon as able, follow-up within 1-2 week. No further acute complaints noted at this time. Patient Active Problem List   Diagnosis    Community acquired pneumonia due to Mycoplasma pneumoniae    Claudication of gluteal region [facility])    Dyslipidemia    Mitral valve insufficiency    Nonrheumatic aortic valve stenosis    Stenosis of carotid artery    Sepsis ([address])    Dementia ([address])    Acute alteration in mental status    Bacteremia    Asteatosis cutis    Cholelithiasis with acute cholecystitis without obstruction    Clubbed toes    Diastolic heart failure (HCC)    Edema    Glucose intolerance (impaired glucose tolerance)    Hearing loss    Hyperlipemia    Scoliosis, thoracogenic    Spondylosis of thoracic region without myelopathy or radiculopathy    Vascular insufficiency    Vitamin D deficiency     Past Medical History:   Diagnosis Date    Bilateral lower extremity edema     Carotid stenosis     Dementia (HCC)     Diastolic CHF, acute on chronic (HCC)     mitral stenosis    FH: carotid endarterectomy     Hiatal hernia     Hypoxia     Loss of coordination     Muscle weakness     Pharyngeal dysphagia     Pneumonia      No past surgical history on file.   Social History     Socioeconomic History    Marital status:      Spouse name: Not on

## 2023-08-30 ENCOUNTER — OFFICE VISIT (OUTPATIENT)
Dept: GERIATRIC MEDICINE | Age: 88
End: 2023-08-30

## 2023-08-30 DIAGNOSIS — G30.9 DEMENTIA IN ALZHEIMER'S DISEASE WITH DEPRESSION (HCC): Primary | ICD-10-CM

## 2023-08-30 DIAGNOSIS — F02.83 DEMENTIA IN ALZHEIMER'S DISEASE WITH DEPRESSION (HCC): Primary | ICD-10-CM

## 2023-09-05 ENCOUNTER — OFFICE VISIT (OUTPATIENT)
Dept: GERIATRIC MEDICINE | Age: 88
End: 2023-09-05
Payer: MEDICARE

## 2023-09-05 DIAGNOSIS — F03.C0 SEVERE DEMENTIA WITHOUT BEHAVIORAL DISTURBANCE, PSYCHOTIC DISTURBANCE, MOOD DISTURBANCE, OR ANXIETY, UNSPECIFIED DEMENTIA TYPE (HCC): ICD-10-CM

## 2023-09-05 DIAGNOSIS — F32.A DEPRESSION, ACUTE: Primary | ICD-10-CM

## 2023-09-05 PROCEDURE — 1123F ACP DISCUSS/DSCN MKR DOCD: CPT | Performed by: PHYSICIAN ASSISTANT

## 2023-09-05 PROCEDURE — 99348 HOME/RES VST EST LOW MDM 30: CPT | Performed by: PHYSICIAN ASSISTANT

## 2023-09-12 ENCOUNTER — OFFICE VISIT (OUTPATIENT)
Dept: GERIATRIC MEDICINE | Age: 88
End: 2023-09-12
Payer: MEDICARE

## 2023-09-12 DIAGNOSIS — R09.89 BIBASILAR CRACKLES: Primary | ICD-10-CM

## 2023-09-12 DIAGNOSIS — I50.32 CHRONIC DIASTOLIC HEART FAILURE (HCC): ICD-10-CM

## 2023-09-12 DIAGNOSIS — F32.1 CURRENT MODERATE EPISODE OF MAJOR DEPRESSIVE DISORDER, UNSPECIFIED WHETHER RECURRENT (HCC): ICD-10-CM

## 2023-09-12 DIAGNOSIS — I34.0 MITRAL VALVE INSUFFICIENCY, UNSPECIFIED ETIOLOGY: ICD-10-CM

## 2023-09-12 PROCEDURE — 99349 HOME/RES VST EST MOD MDM 40: CPT | Performed by: PHYSICIAN ASSISTANT

## 2023-09-12 PROCEDURE — 1123F ACP DISCUSS/DSCN MKR DOCD: CPT | Performed by: PHYSICIAN ASSISTANT

## 2023-09-12 ASSESSMENT — PATIENT HEALTH QUESTIONNAIRE - PHQ9
2. FEELING DOWN, DEPRESSED OR HOPELESS: 2
9. THOUGHTS THAT YOU WOULD BE BETTER OFF DEAD, OR OF HURTING YOURSELF: 1
8. MOVING OR SPEAKING SO SLOWLY THAT OTHER PEOPLE COULD HAVE NOTICED. OR THE OPPOSITE, BEING SO FIGETY OR RESTLESS THAT YOU HAVE BEEN MOVING AROUND A LOT MORE THAN USUAL: 0
6. FEELING BAD ABOUT YOURSELF - OR THAT YOU ARE A FAILURE OR HAVE LET YOURSELF OR YOUR FAMILY DOWN: 1
DEPRESSION UNABLE TO ASSESS: FUNCTIONAL CAPACITY MOTIVATION LIMITS ACCURACY
1. LITTLE INTEREST OR PLEASURE IN DOING THINGS: 2
3. TROUBLE FALLING OR STAYING ASLEEP: 0
SUM OF ALL RESPONSES TO PHQ9 QUESTIONS 1 & 2: 4
SUM OF ALL RESPONSES TO PHQ QUESTIONS 1-9: 7
7. TROUBLE CONCENTRATING ON THINGS, SUCH AS READING THE NEWSPAPER OR WATCHING TELEVISION: 0
SUM OF ALL RESPONSES TO PHQ QUESTIONS 1-9: 7
SUM OF ALL RESPONSES TO PHQ QUESTIONS 1-9: 7
SUM OF ALL RESPONSES TO PHQ QUESTIONS 1-9: 6
4. FEELING TIRED OR HAVING LITTLE ENERGY: 1
5. POOR APPETITE OR OVEREATING: 0

## 2023-09-12 ASSESSMENT — COLUMBIA-SUICIDE SEVERITY RATING SCALE - C-SSRS
2. HAVE YOU ACTUALLY HAD ANY THOUGHTS OF KILLING YOURSELF?: NO
1. WITHIN THE PAST MONTH, HAVE YOU WISHED YOU WERE DEAD OR WISHED YOU COULD GO TO SLEEP AND NOT WAKE UP?: YES
6. HAVE YOU EVER DONE ANYTHING, STARTED TO DO ANYTHING, OR PREPARED TO DO ANYTHING TO END YOUR LIFE?: NO

## 2023-09-12 NOTE — PROGRESS NOTES
Wheezing (Minor, chronic bilateral) present. Musculoskeletal:         General: No tenderness, deformity or signs of injury. Right lower leg: Edema present. Left lower leg: Edema (Left lower leg covered with ABD and Kerlix. Mild moisture from Kerlix.) present. Skin:     General: Skin is warm and dry. Findings: No erythema, lesion or rash. Neurological:      Mental Status: He is alert and oriented to person, place, and time. Mental status is at baseline. Motor: Weakness present. Gait: Gait abnormal.   Psychiatric:         Attention and Perception: Attention normal.         Mood and Affect: Mood is depressed. Affect is flat. Speech: Speech is slurred. Behavior: Behavior is not agitated. Behavior is cooperative. Thought Content: Thought content normal. Thought content does not include suicidal plan. Cognition and Memory: Memory is impaired. Comments: Alert/oriented x1-2/3         Assessment:       Diagnosis Orders   1. 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 No orders of the defined types were placed in this encounter. Patient refused psych consult this time. Patient does have advanced dementia. Will speak with POA. Will discuss possible medication intervention and/or psychiatric/counseling services. Did speak at length about patient motivation and personal history of purposelessness in the season of life. Will do best to redirect and will intervene with medical treatment if accepted by POA. No follow-ups on file. Karly Cottrell    Electronically signed by: SANIYA Cottrell on 9/12/2023    Please note orders entered on site at facility after discussion with appropriate facility nursing/therapy/ / nutritional staff. Current longstanding medical problems and acute medical issues addressed with staff.  Available data and data

## 2023-09-17 ASSESSMENT — PATIENT HEALTH QUESTIONNAIRE - PHQ9
SUM OF ALL RESPONSES TO PHQ QUESTIONS 1-9: 2
SUM OF ALL RESPONSES TO PHQ9 QUESTIONS 1 & 2: 1
3. TROUBLE FALLING OR STAYING ASLEEP: 0
5. POOR APPETITE OR OVEREATING: 0
DEPRESSION UNABLE TO ASSESS: FUNCTIONAL CAPACITY MOTIVATION LIMITS ACCURACY
9. THOUGHTS THAT YOU WOULD BE BETTER OFF DEAD, OR OF HURTING YOURSELF: 0
1. LITTLE INTEREST OR PLEASURE IN DOING THINGS: 0
4. FEELING TIRED OR HAVING LITTLE ENERGY: 0
6. FEELING BAD ABOUT YOURSELF - OR THAT YOU ARE A FAILURE OR HAVE LET YOURSELF OR YOUR FAMILY DOWN: 1
10. IF YOU CHECKED OFF ANY PROBLEMS, HOW DIFFICULT HAVE THESE PROBLEMS MADE IT FOR YOU TO DO YOUR WORK, TAKE CARE OF THINGS AT HOME, OR GET ALONG WITH OTHER PEOPLE: 0
8. MOVING OR SPEAKING SO SLOWLY THAT OTHER PEOPLE COULD HAVE NOTICED. OR THE OPPOSITE, BEING SO FIGETY OR RESTLESS THAT YOU HAVE BEEN MOVING AROUND A LOT MORE THAN USUAL: 0
7. TROUBLE CONCENTRATING ON THINGS, SUCH AS READING THE NEWSPAPER OR WATCHING TELEVISION: 0
SUM OF ALL RESPONSES TO PHQ QUESTIONS 1-9: 2
2. FEELING DOWN, DEPRESSED OR HOPELESS: 1
SUM OF ALL RESPONSES TO PHQ QUESTIONS 1-9: 2
SUM OF ALL RESPONSES TO PHQ QUESTIONS 1-9: 2

## 2023-09-19 ENCOUNTER — OFFICE VISIT (OUTPATIENT)
Dept: GERIATRIC MEDICINE | Age: 88
End: 2023-09-19
Payer: MEDICARE

## 2023-09-19 DIAGNOSIS — R60.0 BILATERAL LEG EDEMA: ICD-10-CM

## 2023-09-19 DIAGNOSIS — Z91.89 AT RISK FOR DEPRESSION: Primary | ICD-10-CM

## 2023-09-19 DIAGNOSIS — I50.32 CHRONIC DIASTOLIC HEART FAILURE (HCC): ICD-10-CM

## 2023-09-19 DIAGNOSIS — I99.8 VASCULAR INSUFFICIENCY: ICD-10-CM

## 2023-09-19 PROCEDURE — 1123F ACP DISCUSS/DSCN MKR DOCD: CPT | Performed by: PHYSICIAN ASSISTANT

## 2023-09-19 PROCEDURE — 99349 HOME/RES VST EST MOD MDM 40: CPT | Performed by: PHYSICIAN ASSISTANT

## 2023-09-26 ASSESSMENT — PATIENT HEALTH QUESTIONNAIRE - PHQ9
9. THOUGHTS THAT YOU WOULD BE BETTER OFF DEAD, OR OF HURTING YOURSELF: 1
SUM OF ALL RESPONSES TO PHQ QUESTIONS 1-9: 1
SUM OF ALL RESPONSES TO PHQ9 QUESTIONS 1 & 2: 1
SUM OF ALL RESPONSES TO PHQ QUESTIONS 1-9: 2
8. MOVING OR SPEAKING SO SLOWLY THAT OTHER PEOPLE COULD HAVE NOTICED. OR THE OPPOSITE, BEING SO FIGETY OR RESTLESS THAT YOU HAVE BEEN MOVING AROUND A LOT MORE THAN USUAL: 0
5. POOR APPETITE OR OVEREATING: 0
1. LITTLE INTEREST OR PLEASURE IN DOING THINGS: 0
6. FEELING BAD ABOUT YOURSELF - OR THAT YOU ARE A FAILURE OR HAVE LET YOURSELF OR YOUR FAMILY DOWN: 0
SUM OF ALL RESPONSES TO PHQ QUESTIONS 1-9: 2
2. FEELING DOWN, DEPRESSED OR HOPELESS: 1
10. IF YOU CHECKED OFF ANY PROBLEMS, HOW DIFFICULT HAVE THESE PROBLEMS MADE IT FOR YOU TO DO YOUR WORK, TAKE CARE OF THINGS AT HOME, OR GET ALONG WITH OTHER PEOPLE: 1
DEPRESSION UNABLE TO ASSESS: FUNCTIONAL CAPACITY MOTIVATION LIMITS ACCURACY
SUM OF ALL RESPONSES TO PHQ QUESTIONS 1-9: 2
3. TROUBLE FALLING OR STAYING ASLEEP: 0
7. TROUBLE CONCENTRATING ON THINGS, SUCH AS READING THE NEWSPAPER OR WATCHING TELEVISION: 0
4. FEELING TIRED OR HAVING LITTLE ENERGY: 0

## 2023-09-26 ASSESSMENT — COLUMBIA-SUICIDE SEVERITY RATING SCALE - C-SSRS
1. WITHIN THE PAST MONTH, HAVE YOU WISHED YOU WERE DEAD OR WISHED YOU COULD GO TO SLEEP AND NOT WAKE UP?: YES
6. HAVE YOU EVER DONE ANYTHING, STARTED TO DO ANYTHING, OR PREPARED TO DO ANYTHING TO END YOUR LIFE?: NO
2. HAVE YOU ACTUALLY HAD ANY THOUGHTS OF KILLING YOURSELF?: NO

## 2023-09-26 NOTE — PROGRESS NOTES
is warm and dry. Findings: No erythema, lesion or rash. Neurological:      Mental Status: He is alert and oriented to person, place, and time. Mental status is at baseline. Motor: Weakness present. Gait: Gait abnormal.   Psychiatric:         Attention and Perception: Attention normal.         Mood and Affect: Mood is depressed. Affect is flat. Speech: Speech is slurred. Behavior: Behavior is not agitated. Behavior is cooperative. Thought Content: Thought content normal. Thought content does not include suicidal plan. Cognition and Memory: Memory is impaired. Comments: Alert/oriented x1-2/3         Assessment:       Diagnosis Orders   1. Bibasilar crackles        2. Chronic diastolic heart failure ([address])        3. Mitral valve insufficiency, unspecified etiology        4. Current moderate episode of major depressive disorder, unspecified whether recurrent ([address])              Plan:      No orders of the defined types were placed in this encounter. No orders of the defined types were placed in this encounter. Patient vital signs stable. Overall respiration stable. Continue monitor lung sounds for worsening. We will add further set chest x-ray follow-up if needed. No new additional medication orders. Mood seems to have improved over the past week. Continue monitor closely. With routine encouragement of use of psychiatric services were indicated. No follow-ups on file. Karly Ramirez    Electronically signed by: SANIYA Ramirez on 9/26/2023    Please note orders entered on site at facility after discussion with appropriate facility nursing/therapy/ / nutritional staff. Current longstanding medical problems and acute medical issues addressed with staff. Available data and data elements in on site paper chart reviewed and analyzed. Current external consultant notes reviewed in on site chart.  Ordered laboratory testing and imaging will

## 2023-10-05 NOTE — PROGRESS NOTES
is flat. Speech: Speech is slurred. Behavior: Behavior is not agitated. Behavior is cooperative. Thought Content: Thought content normal. Thought content does not include suicidal plan. Cognition and Memory: Memory is impaired. Comments: Alert/oriented x1-2/3         Assessment:       Diagnosis Orders   1. At risk for depression        2. Bilateral leg edema        3. Vascular insufficiency        4. Chronic diastolic heart failure ([address])              Plan:      No orders of the defined types were placed in this encounter. No orders of the defined types were placed in this encounter. No new evidence of cardiorespiratory decompensation. Lung sounds overall improved compared to weeks prior. No new changes to medications. Continue keeping legs propped at rest.  We will have patient referred to psychiatric service if further symptomatic depression. Consider adding low-dose Celexa. No follow-ups on file. Karly Draper    Electronically signed by: SANIYA Draper on 10/4/2023    Please note orders entered on site at facility after discussion with appropriate facility nursing/therapy/ / nutritional staff. Current longstanding medical problems and acute medical issues addressed with staff. Available data and data elements in on site paper chart reviewed and analyzed. Current external consultant notes reviewed in on site chart. Ordered laboratory testing and imaging will be reviewed when available. Side effects, adverse effects of the medication prescribed today, as well as treatment plan and result expectations have been discussed withthe patient who expresses understanding and desires to proceed.     I spent a total of 25 minutes on the date of service which included preparing to see the patient, face-to-face patient care, performing a medically appropriate examination, completing clinical documentation, and on counseling/ eductaing the patient and

## 2023-10-11 ENCOUNTER — HOSPITAL ENCOUNTER (EMERGENCY)
Age: 88
Discharge: OTHER INSTITUTION | End: 2023-10-11
Payer: MEDICARE

## 2023-10-11 ENCOUNTER — APPOINTMENT (OUTPATIENT)
Dept: CT IMAGING | Age: 88
End: 2023-10-11
Payer: MEDICARE

## 2023-10-11 ENCOUNTER — APPOINTMENT (OUTPATIENT)
Dept: GENERAL RADIOLOGY | Age: 88
End: 2023-10-11
Payer: MEDICARE

## 2023-10-11 VITALS
RESPIRATION RATE: 23 BRPM | DIASTOLIC BLOOD PRESSURE: 59 MMHG | TEMPERATURE: 97.4 F | SYSTOLIC BLOOD PRESSURE: 107 MMHG | HEART RATE: 70 BPM | OXYGEN SATURATION: 94 %
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
## 2023-10-11 DIAGNOSIS — S09.90XA CLOSED HEAD INJURY, INITIAL ENCOUNTER: ICD-10-CM

## 2023-10-11 DIAGNOSIS — S00.81XA ABRASION OF FOREHEAD, INITIAL ENCOUNTER: ICD-10-CM

## 2023-10-11 LAB
ALBUMIN SERPL-MCNC: 3.4 G/DL (ref 3.5–4.6)
ALP SERPL-CCNC: 81 U/L (ref 35–104)
ALT SERPL-CCNC: 8 U/L (ref 0–41)
ANION GAP SERPL CALCULATED.3IONS-SCNC: 12 MEQ/L (ref 9–15)
APTT PPP: 31.6 SEC (ref 24.4–36.8)
AST SERPL-CCNC: 12 U/L (ref 0–40)
BASOPHILS # BLD: 0.1 K/UL (ref 0–0.2)
BASOPHILS NFR BLD: 1.1 %
BILIRUB SERPL-MCNC: 0.6 MG/DL (ref 0.2–0.7)
BILIRUB UR QL STRIP: NEGATIVE
BNP BLD-MCNC: 294 PG/ML
BUN SERPL-MCNC: 14 MG/DL (ref 8–23)
CALCIUM SERPL-MCNC: 8.5 MG/DL (ref 8.5–9.9)
CHLORIDE SERPL-SCNC: 103 MEQ/L (ref 95–107)
CHP ED QC CHECK: NORMAL
CLARITY UR: CLEAR
CO2 SERPL-SCNC: 24 MEQ/L (ref 20–31)
COLOR UR: YELLOW
CREAT SERPL-MCNC: 0.81 MG/DL (ref 0.7–1.2)
EOSINOPHIL # BLD: 0.5 K/UL (ref 0–0.7)
EOSINOPHIL NFR BLD: 7.1 %
ERYTHROCYTE [DISTWIDTH] IN BLOOD BY AUTOMATED COUNT: 13.2 % (ref 11.5–14.5)
GLOBULIN SER CALC-MCNC: 2.6 G/DL (ref 2.3–3.5)
GLUCOSE BLD-MCNC: 142 MG/DL
GLUCOSE BLD-MCNC: 143 MG/DL (ref 70–99)
GLUCOSE SERPL-MCNC: 135 MG/DL (ref 70–99)
GLUCOSE UR STRIP-MCNC: NEGATIVE MG/DL
HCT VFR BLD AUTO: 38.6 % (ref 42–52)
HGB BLD-MCNC: 12.6 G/DL (ref 14–18)
HGB UR QL STRIP: NEGATIVE
INR PPP: 1.1
KETONES UR STRIP-MCNC: NEGATIVE MG/DL
LACTATE BLDV-SCNC: 1.1 MMOL/L (ref 0.5–2.2)
LEUKOCYTE ESTERASE UR QL STRIP: NEGATIVE
LYMPHOCYTES # BLD: 1.8 K/UL (ref 1–4.8)
LYMPHOCYTES NFR BLD: 23.8 %
MAGNESIUM SERPL-MCNC: 2.1 MG/DL (ref 1.7–2.4)
MCH RBC QN AUTO: 30.7 PG (ref 27–31.3)
MCHC RBC AUTO-ENTMCNC: 32.6 % (ref 33–37)
MCV RBC AUTO: 94.1 FL (ref 79–92.2)
MONOCYTES # BLD: 0.7 K/UL (ref 0.2–0.8)
MONOCYTES NFR BLD: 9.7 %
NEUTROPHILS # BLD: 4.4 K/UL (ref 1.4–6.5)
NEUTS SEG NFR BLD: 58 %
NITRITE UR QL STRIP: NEGATIVE
PERFORMED ON: ABNORMAL
PH UR STRIP: 8 [PH] (ref 5–9)
PLATELET # BLD AUTO: 199 K/UL (ref 130–400)
POTASSIUM SERPL-SCNC: 4 MEQ/L (ref 3.4–4.9)
PROCALCITONIN SERPL IA-MCNC: 0.05 NG/ML (ref 0–0.15)
PROT SERPL-MCNC: 6 G/DL (ref 6.3–8)
PROT UR STRIP-MCNC: NEGATIVE MG/DL
PROTHROMBIN TIME: 14.3 SEC (ref 12.3–14.9)
RBC # BLD AUTO: 4.1 M/UL (ref 4.7–6.1)
SODIUM SERPL-SCNC: 139 MEQ/L (ref 135–144)
SP GR UR STRIP: 1.02 (ref 1–1.03)
TROPONIN, HIGH SENSITIVITY: 34 NG/L (ref 0–19)
TROPONIN, HIGH SENSITIVITY: 35 NG/L (ref 0–19)
TROPONIN, HIGH SENSITIVITY: 36 NG/L (ref 0–19)
URINE REFLEX TO CULTURE: NORMAL
UROBILINOGEN UR STRIP-ACNC: 1 E.U./DL
WBC # BLD AUTO: 7.6 K/UL (ref 4.8–10.8)

## 2023-10-11 PROCEDURE — 83880 ASSAY OF NATRIURETIC PEPTIDE: CPT

## 2023-10-11 PROCEDURE — 70450 CT HEAD/BRAIN W/O DYE: CPT

## 2023-10-11 PROCEDURE — 85610 PROTHROMBIN TIME: CPT

## 2023-10-11 PROCEDURE — 70486 CT MAXILLOFACIAL W/O DYE: CPT

## 2023-10-11 PROCEDURE — 6370000000 HC RX 637 (ALT 250 FOR IP): Performed by: STUDENT IN AN ORGANIZED HEALTH CARE EDUCATION/TRAINING PROGRAM

## 2023-10-11 PROCEDURE — 6830039000 HC L3 TRAUMA ALERT

## 2023-10-11 PROCEDURE — 36415 COLL VENOUS BLD VENIPUNCTURE: CPT

## 2023-10-11 PROCEDURE — 83735 ASSAY OF MAGNESIUM: CPT

## 2023-10-11 PROCEDURE — 99285 EMERGENCY DEPT VISIT HI MDM: CPT

## 2023-10-11 PROCEDURE — 85025 COMPLETE CBC W/AUTO DIFF WBC: CPT

## 2023-10-11 PROCEDURE — 84145 PROCALCITONIN (PCT): CPT

## 2023-10-11 PROCEDURE — 73521 X-RAY EXAM HIPS BI 2 VIEWS: CPT

## 2023-10-11 PROCEDURE — 81003 URINALYSIS AUTO W/O SCOPE: CPT

## 2023-10-11 PROCEDURE — 93005 ELECTROCARDIOGRAM TRACING: CPT | Performed by: STUDENT IN AN ORGANIZED HEALTH CARE EDUCATION/TRAINING PROGRAM

## 2023-10-11 PROCEDURE — 72125 CT NECK SPINE W/O DYE: CPT

## 2023-10-11 PROCEDURE — 85730 THROMBOPLASTIN TIME PARTIAL: CPT

## 2023-10-11 PROCEDURE — 84484 ASSAY OF TROPONIN QUANT: CPT

## 2023-10-11 PROCEDURE — 80053 COMPREHEN METABOLIC PANEL: CPT

## 2023-10-11 PROCEDURE — 83605 ASSAY OF LACTIC ACID: CPT

## 2023-10-11 PROCEDURE — 71045 X-RAY EXAM CHEST 1 VIEW: CPT

## 2023-10-11 RX ORDER — BACITRACIN ZINC 500 [USP'U]/G
OINTMENT TOPICAL ONCE
Status: COMPLETED | OUTPATIENT
Start: 2023-10-11 | End: 2023-10-11

## 2023-10-11 RX ADMIN — BACITRACIN ZINC: 500 OINTMENT TOPICAL at 10:31

## 2023-10-11 NOTE — ED NOTES
Discharge  instructions given and reviewed with daughter. Patient's daughter verbalized understanding. Patient assisted out of ED via wheelchair to POV.       Isacc Bonds RN  10/11/23 1038

## 2023-10-11 NOTE — ED NOTES
Pts diaper changed and pt cleaned up. Has yeasty smelling redness under pannus and in groin folds. SANIYA Masters made aware.       Tatiana Pereira, SOUMYA  10/11/23 2280

## 2023-10-11 NOTE — ED PROVIDER NOTES
cspine and facial bones negative for acute abnormality such as ICH or fracture. Xray bilateral hips including pelvis negative for fracture. CXR shows no acute intrathoracic process, old granulomatous disease and hiatal hernia. Pt is stable for discharge back to [facility]. Return to ED for worsening or new sx. Pt understands, agrees to plan. REASSESSMENT          CRITICAL CARE TIME   Total Critical Care time was 0 minutes, excluding separately reportable procedures. There was a high probability of clinically significant/life threatening deterioration in the patient's condition which required my urgent intervention. CONSULTS:  None    PROCEDURES:  Unless otherwise noted below, none     Procedures        FINAL IMPRESSION      1. Fall, initial encounter    2. Closed head injury, initial encounter    3.  Abrasion of forehead, initial encounter          DISPOSITION/PLAN   DISPOSITION Decision To Discharge 10/11/2023 10:40:47 AM      PATIENT REFERRED TO:  Gene Nichole, [address] [facility]  [phone]    Schedule an appointment as soon as possible for a visit in 2 days      [facility]) ED  [address] [facility] [address] [phone]  Go to   As needed, If symptoms worsen      DISCHARGE MEDICATIONS:  Discharge Medication List as of 10/11/2023 10:22 AM        Controlled Substances Monitoring:          No data to display                (Please note that portions of this note were completed with a voice recognition program.  Efforts were made to edit the dictations but occasionally words are mis-transcribed.)    Vira Betts PA-C (electronically signed)             Vira Betts PA-C  10/12/23 [address] [place] [address]

## 2023-10-11 NOTE — ED NOTES
Returned. Placed back onto monitor. Declines need of anything at this time. Aware we are waiting on results. Call light within reach. No acute distress noted at this time.        Tatiana Pereira RN  10/11/23 3396

## 2023-10-12 LAB
EKG ATRIAL RATE: 80 BPM
EKG P AXIS: 36 DEGREES
EKG P-R INTERVAL: 172 MS
EKG Q-T INTERVAL: 388 MS
EKG QRS DURATION: 80 MS
EKG QTC CALCULATION (BAZETT): 447 MS
EKG R AXIS: 9 DEGREES
EKG T AXIS: 43 DEGREES
EKG VENTRICULAR RATE: 80 BPM

## 2023-10-12 PROCEDURE — 93010 ELECTROCARDIOGRAM REPORT: CPT | Performed by: INTERNAL MEDICINE

## 2023-10-12 ASSESSMENT — ENCOUNTER SYMPTOMS
ABDOMINAL PAIN: 0
CONSTIPATION: 0
COUGH: 1
BLOOD IN STOOL: 0
DIARRHEA: 0
PHOTOPHOBIA: 0
VOMITING: 0
WHEEZING: 0
NAUSEA: 0
SHORTNESS OF BREATH: 0

## 2023-10-17 ENCOUNTER — OFFICE VISIT (OUTPATIENT)
Dept: GERIATRIC MEDICINE | Age: 88
End: 2023-10-17

## 2023-10-17 DIAGNOSIS — Z79.01 LONG TERM CURRENT USE OF ANTICOAGULANT: ICD-10-CM

## 2023-10-17 DIAGNOSIS — Z91.81 HISTORY OF RECENT FALL: Primary | ICD-10-CM

## 2023-10-22 ENCOUNTER — APPOINTMENT (OUTPATIENT)
Dept: GENERAL RADIOLOGY | Age: 88
DRG: 871 | End: 2023-10-22
Payer: MEDICARE

## 2023-10-22 ENCOUNTER — HOSPITAL ENCOUNTER (EMERGENCY)
Age: 88
Discharge: HOME | DRG: 871 | End: 2023-10-22
Payer: MEDICARE

## 2023-10-22 ENCOUNTER — APPOINTMENT (OUTPATIENT)
Dept: CT IMAGING | Age: 88
DRG: 871 | End: 2023-10-22
Payer: MEDICARE

## 2023-10-22 VITALS
HEIGHT: 72 IN | TEMPERATURE: 97.8 F | RESPIRATION RATE: 14 BRPM | OXYGEN SATURATION: 94 % | HEART RATE: 92 BPM | WEIGHT: 200 LBS | DIASTOLIC BLOOD PRESSURE: 65 MMHG | BODY MASS INDEX: 27.09 KG/M2 | SYSTOLIC BLOOD PRESSURE: 104 MMHG
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
## 2023-10-22 DIAGNOSIS — K59.00 CONSTIPATION, UNSPECIFIED CONSTIPATION TYPE: ICD-10-CM

## 2023-10-22 LAB
ALBUMIN SERPL-MCNC: 3.2 G/DL (ref 3.5–4.6)
ALP SERPL-CCNC: 89 U/L (ref 35–104)
ALT SERPL-CCNC: 9 U/L (ref 0–41)
ANION GAP SERPL CALCULATED.3IONS-SCNC: 14 MEQ/L (ref 9–15)
APTT PPP: 31.9 SEC (ref 24.4–36.8)
AST SERPL-CCNC: 21 U/L (ref 0–40)
BASOPHILS # BLD: 0.1 K/UL (ref 0–0.2)
BASOPHILS NFR BLD: 0.5 %
BILIRUB SERPL-MCNC: 0.4 MG/DL (ref 0.2–0.7)
BUN SERPL-MCNC: 16 MG/DL (ref 8–23)
CALCIUM SERPL-MCNC: 8.5 MG/DL (ref 8.5–9.9)
CHLORIDE SERPL-SCNC: 99 MEQ/L (ref 95–107)
CO2 SERPL-SCNC: 24 MEQ/L (ref 20–31)
CREAT SERPL-MCNC: 1.18 MG/DL (ref 0.7–1.2)
EOSINOPHIL # BLD: 0.3 K/UL (ref 0–0.7)
EOSINOPHIL NFR BLD: 1.8 %
ERYTHROCYTE [DISTWIDTH] IN BLOOD BY AUTOMATED COUNT: 12.9 % (ref 11.5–14.5)
GLOBULIN SER CALC-MCNC: 3.2 G/DL (ref 2.3–3.5)
GLUCOSE SERPL-MCNC: 138 MG/DL (ref 70–99)
HCT VFR BLD AUTO: 42.1 % (ref 42–52)
HGB BLD-MCNC: 13.9 G/DL (ref 14–18)
INR PPP: 1.1
LYMPHOCYTES # BLD: 1 K/UL (ref 1–4.8)
LYMPHOCYTES NFR BLD: 6.6 %
MCH RBC QN AUTO: 30.8 PG (ref 27–31.3)
MCHC RBC AUTO-ENTMCNC: 33 % (ref 33–37)
MCV RBC AUTO: 93.1 FL (ref 79–92.2)
MONOCYTES # BLD: 0.2 K/UL (ref 0.2–0.8)
MONOCYTES NFR BLD: 1.6 %
NEUTROPHILS # BLD: 13.6 K/UL (ref 1.4–6.5)
NEUTS SEG NFR BLD: 88.7 %
PLATELET # BLD AUTO: 232 K/UL (ref 130–400)
POTASSIUM SERPL-SCNC: 5.1 MEQ/L (ref 3.4–4.9)
PROT SERPL-MCNC: 6.4 G/DL (ref 6.3–8)
PROTHROMBIN TIME: 13.9 SEC (ref 12.3–14.9)
RBC # BLD AUTO: 4.52 M/UL (ref 4.7–6.1)
REASON FOR REJECTION: NORMAL
REJECTED TEST: NORMAL
SODIUM SERPL-SCNC: 137 MEQ/L (ref 135–144)
WBC # BLD AUTO: 15.4 K/UL (ref 4.8–10.8)

## 2023-10-22 PROCEDURE — 72125 CT NECK SPINE W/O DYE: CPT

## 2023-10-22 PROCEDURE — 85730 THROMBOPLASTIN TIME PARTIAL: CPT

## 2023-10-22 PROCEDURE — 71250 CT THORAX DX C-: CPT

## 2023-10-22 PROCEDURE — 99285 EMERGENCY DEPT VISIT HI MDM: CPT | Performed by: NURSE PRACTITIONER

## 2023-10-22 PROCEDURE — 85610 PROTHROMBIN TIME: CPT

## 2023-10-22 PROCEDURE — 74176 CT ABD & PELVIS W/O CONTRAST: CPT

## 2023-10-22 PROCEDURE — 73502 X-RAY EXAM HIP UNI 2-3 VIEWS: CPT

## 2023-10-22 PROCEDURE — 6360000002 HC RX W HCPCS: Performed by: NURSE PRACTITIONER

## 2023-10-22 PROCEDURE — 96375 TX/PRO/DX INJ NEW DRUG ADDON: CPT | Performed by: NURSE PRACTITIONER

## 2023-10-22 PROCEDURE — 36415 COLL VENOUS BLD VENIPUNCTURE: CPT

## 2023-10-22 PROCEDURE — 70450 CT HEAD/BRAIN W/O DYE: CPT

## 2023-10-22 PROCEDURE — 96374 THER/PROPH/DIAG INJ IV PUSH: CPT | Performed by: NURSE PRACTITIONER

## 2023-10-22 PROCEDURE — 6360000002 HC RX W HCPCS: Performed by: PERSONAL EMERGENCY RESPONSE ATTENDANT

## 2023-10-22 PROCEDURE — 72131 CT LUMBAR SPINE W/O DYE: CPT

## 2023-10-22 PROCEDURE — 85025 COMPLETE CBC W/AUTO DIFF WBC: CPT

## 2023-10-22 PROCEDURE — 80053 COMPREHEN METABOLIC PANEL: CPT

## 2023-10-22 PROCEDURE — 72128 CT CHEST SPINE W/O DYE: CPT

## 2023-10-22 RX ORDER — ONDANSETRON 2 MG/ML
4 INJECTION INTRAMUSCULAR; INTRAVENOUS ONCE
Status: COMPLETED | OUTPATIENT
Start: 2023-10-22 | End: 2023-10-22

## 2023-10-22 RX ORDER — FENTANYL CITRATE 0.05 MG/ML
25 INJECTION, SOLUTION INTRAMUSCULAR; INTRAVENOUS ONCE
Status: COMPLETED | OUTPATIENT
Start: 2023-10-22 | End: 2023-10-22

## 2023-10-22 RX ORDER — MORPHINE SULFATE 4 MG/ML
4 INJECTION, SOLUTION INTRAMUSCULAR; INTRAVENOUS ONCE
Status: COMPLETED | OUTPATIENT
Start: 2023-10-22 | End: 2023-10-22

## 2023-10-22 RX ADMIN — MORPHINE SULFATE 4 MG: 4 INJECTION, SOLUTION INTRAMUSCULAR; INTRAVENOUS at 18:48

## 2023-10-22 RX ADMIN — FENTANYL CITRATE 25 MCG: 50 INJECTION INTRAMUSCULAR; INTRAVENOUS at 17:21

## 2023-10-22 RX ADMIN — ONDANSETRON 4 MG: 2 INJECTION INTRAMUSCULAR; INTRAVENOUS at 18:48

## 2023-10-22 ASSESSMENT — ENCOUNTER SYMPTOMS
TROUBLE SWALLOWING: 0
BACK PAIN: 0
SORE THROAT: 0
COLOR CHANGE: 0
DIARRHEA: 0
ABDOMINAL PAIN: 0
CONSTIPATION: 0
NAUSEA: 0
VOMITING: 0
VOICE CHANGE: 0
COUGH: 0
SHORTNESS OF BREATH: 0

## 2023-10-22 ASSESSMENT — PAIN - FUNCTIONAL ASSESSMENT: PAIN_FUNCTIONAL_ASSESSMENT: 0-10

## 2023-10-22 ASSESSMENT — PAIN DESCRIPTION - ORIENTATION
ORIENTATION: RIGHT
ORIENTATION: RIGHT

## 2023-10-22 ASSESSMENT — PAIN SCALES - GENERAL
PAINLEVEL_OUTOF10: 4
PAINLEVEL_OUTOF10: 7

## 2023-10-22 ASSESSMENT — LIFESTYLE VARIABLES
HOW MANY STANDARD DRINKS CONTAINING ALCOHOL DO YOU HAVE ON A TYPICAL DAY: PATIENT DOES NOT DRINK
HOW OFTEN DO YOU HAVE A DRINK CONTAINING ALCOHOL: NEVER

## 2023-10-22 ASSESSMENT — PAIN DESCRIPTION - LOCATION
LOCATION: HIP
LOCATION: HIP

## 2023-10-22 NOTE — ED NOTES
Pt reports right hip pain   SANIYA Rausch notified   Orders placed      Vignesh Sewell, SOUMYA  10/22/23 3099

## 2023-10-22 NOTE — ED PROVIDER NOTES
[facility] ED  EMERGENCY DEPARTMENT ENCOUNTER      Pt Name: Kavon Garcia  MRN: [id number]  [address] [facility] 11/30/1932  Date of evaluation: 10/22/2023  Provider: AZEB Thorpe CNP    CHIEF COMPLAINT       Chief Complaint   Patient presents with    Fall         HISTORY OF PRESENT ILLNESS   (Location/Symptom, Timing/Onset, Context/Setting, Quality, Duration, Modifying Factors, Severity)  Note limiting factors. Kavon Garcia is a 80 y.o. male per chart review with history of CAP, dyslipidemia, mitral valve insufficiency, sepsis, dementia, diastolic heart failure, vascular insufficiency, and vit d deficiency who presents to the emergency department from [facility] via EMS s/p unwitnessed fall. He was found on the bathroom floor by staff with complaints of right hip pain and headache. He is not on any thinners. He reports that he fell from standing and hit his head but denies LOC. On arrival he is alert and oriented to person and place. He notes slight headache and right hip pain however is unable to describe the pain. Denies any chest pain, shortness of breath, N/V/D, abdominal pain or urinary symptoms. HPI    Nursing Notes were reviewed. REVIEW OF SYSTEMS    (2-9 systems for level 4, 10 or more for level 5)     Review of Systems   Constitutional:  Negative for appetite change and fever. HENT:  Negative for drooling, ear pain, sore throat, trouble swallowing and voice change. Respiratory:  Negative for cough and shortness of breath. Cardiovascular:  Negative for chest pain. Gastrointestinal:  Negative for abdominal pain, constipation, diarrhea, nausea and vomiting. Genitourinary:  Negative for decreased urine volume and dysuria. Musculoskeletal:  Positive for arthralgias. Negative for back pain. Skin:  Negative for color change. Neurological:  Positive for headaches. Negative for dizziness, weakness and light-headedness.    Psychiatric/Behavioral:  Negative for agitation and Medication List as of 10/22/2023  7:12 PM        Controlled Substances Monitoring:          No data to display                (Please note that portions of this note were completed with a voice recognition program.  Efforts were made to edit the dictations but occasionally words are mis-transcribed.)    AZEB Rey CNP (electronically signed)  Attending Emergency Physician           AZEB Shay CNP  10/25/23 1048

## 2023-10-22 NOTE — ED TRIAGE NOTES
Pt arrived by ems from [facility] with report of an unwitnessed fall     Pt was found in his bathroom on the floor c/o right hip pain     Pt has hx of dementia     Pt is not on blood thinners

## 2023-10-24 ENCOUNTER — HOSPITAL ENCOUNTER (INPATIENT)
Age: 88
LOS: 4 days | Discharge: NURSING, REHAB OR LONG-TERM CARE | DRG: 871 | End: 2023-10-28
Attending: EMERGENCY MEDICINE | Admitting: INTERNAL MEDICINE
Payer: MEDICARE

## 2023-10-24 ENCOUNTER — APPOINTMENT (OUTPATIENT)
Dept: ULTRASOUND IMAGING | Age: 88
DRG: 871 | End: 2023-10-24
Payer: MEDICARE

## 2023-10-24 ENCOUNTER — APPOINTMENT (OUTPATIENT)
Dept: GENERAL RADIOLOGY | Age: 88
DRG: 871 | End: 2023-10-24
Payer: MEDICARE

## 2023-10-24 ENCOUNTER — APPOINTMENT (OUTPATIENT)
Dept: CT IMAGING | Age: 88
DRG: 871 | End: 2023-10-24
Payer: MEDICARE

## 2023-10-24 DIAGNOSIS — R50.9 FEBRILE ILLNESS: ICD-10-CM

## 2023-10-24 DIAGNOSIS — R53.1 GENERAL WEAKNESS: ICD-10-CM

## 2023-10-24 DIAGNOSIS — R09.02 HYPOXIA: ICD-10-CM

## 2023-10-24 DIAGNOSIS — J18.9 SEPSIS DUE TO PNEUMONIA (HCC): ICD-10-CM

## 2023-10-24 DIAGNOSIS — A41.9 SEPSIS DUE TO PNEUMONIA (HCC): ICD-10-CM

## 2023-10-24 DIAGNOSIS — I50.32 CHRONIC DIASTOLIC HEART FAILURE (HCC): ICD-10-CM

## 2023-10-24 DIAGNOSIS — J18.9 PNEUMONIA DUE TO INFECTIOUS ORGANISM, UNSPECIFIED LATERALITY, UNSPECIFIED PART OF LUNG: Primary | ICD-10-CM

## 2023-10-24 LAB
ALBUMIN SERPL-MCNC: 3.2 G/DL (ref 3.5–4.6)
ALP SERPL-CCNC: 88 U/L (ref 35–104)
ALT SERPL-CCNC: 12 U/L (ref 0–41)
ANION GAP SERPL CALCULATED.3IONS-SCNC: 7 MEQ/L (ref 9–15)
AST SERPL-CCNC: 31 U/L (ref 0–40)
BACTERIA URNS QL MICRO: NEGATIVE /HPF
BASE EXCESS ARTERIAL: 6 (ref -3–3)
BASOPHILS # BLD: 0.1 K/UL (ref 0–0.2)
BASOPHILS NFR BLD: 0.6 %
BILIRUB SERPL-MCNC: 1.5 MG/DL (ref 0.2–0.7)
BILIRUB UR QL STRIP: NEGATIVE
BNP BLD-MCNC: 3704 PG/ML
BUN SERPL-MCNC: 23 MG/DL (ref 8–23)
CALCIUM IONIZED: 1.13 MMOL/L (ref 1.12–1.32)
CALCIUM SERPL-MCNC: 8.4 MG/DL (ref 8.5–9.9)
CHLORIDE SERPL-SCNC: 100 MEQ/L (ref 95–107)
CLARITY UR: CLEAR
CO2 SERPL-SCNC: 33 MEQ/L (ref 20–31)
COLOR UR: YELLOW
CREAT SERPL-MCNC: 1.07 MG/DL (ref 0.7–1.2)
EOSINOPHIL # BLD: 0.5 K/UL (ref 0–0.7)
EOSINOPHIL NFR BLD: 3.7 %
EPI CELLS #/AREA URNS AUTO: ABNORMAL /HPF (ref 0–5)
ERYTHROCYTE [DISTWIDTH] IN BLOOD BY AUTOMATED COUNT: 13.2 % (ref 11.5–14.5)
GLOBULIN SER CALC-MCNC: 3.1 G/DL (ref 2.3–3.5)
GLUCOSE BLD-MCNC: 178 MG/DL (ref 70–99)
GLUCOSE BLD-MCNC: 190 MG/DL (ref 70–99)
GLUCOSE SERPL-MCNC: 200 MG/DL (ref 70–99)
GLUCOSE UR STRIP-MCNC: NEGATIVE MG/DL
HBA1C MFR BLD: 7 % (ref 4.8–5.9)
HCO3 ARTERIAL: 30.7 MMOL/L (ref 21–29)
HCT VFR BLD AUTO: 37 % (ref 41–53)
HCT VFR BLD AUTO: 39.2 % (ref 42–52)
HGB BLD CALC-MCNC: 12.6 GM/DL (ref 13.5–17.5)
HGB BLD-MCNC: 12.9 G/DL (ref 14–18)
HGB UR QL STRIP: NEGATIVE
HYALINE CASTS #/AREA URNS AUTO: ABNORMAL /HPF (ref 0–5)
INFLUENZA A BY PCR: NEGATIVE
INFLUENZA B BY PCR: NEGATIVE
KETONES UR STRIP-MCNC: NEGATIVE MG/DL
LACTATE: 1.25 MMOL/L (ref 0.4–2)
LACTIC ACID, SEPSIS: 1.4 MMOL/L (ref 0.5–1.9)
LACTIC ACID, SEPSIS: 2 MMOL/L (ref 0.5–1.9)
LACTIC ACID, SEPSIS: 2.2 MMOL/L (ref 0.5–1.9)
LACTIC ACID, SEPSIS: 2.5 MMOL/L (ref 0.5–1.9)
LEUKOCYTE ESTERASE UR QL STRIP: NEGATIVE
LYMPHOCYTES # BLD: 1.1 K/UL (ref 1–4.8)
LYMPHOCYTES NFR BLD: 8.6 %
MAGNESIUM SERPL-MCNC: 2.1 MG/DL (ref 1.7–2.4)
MCH RBC QN AUTO: 30.9 PG (ref 27–31.3)
MCHC RBC AUTO-ENTMCNC: 32.9 % (ref 33–37)
MCV RBC AUTO: 93.8 FL (ref 79–92.2)
MONOCYTES # BLD: 0.8 K/UL (ref 0.2–0.8)
MONOCYTES NFR BLD: 6.7 %
NEUTROPHILS # BLD: 10.1 K/UL (ref 1.4–6.5)
NEUTS SEG NFR BLD: 79.9 %
NITRITE UR QL STRIP: NEGATIVE
O2 SAT, ARTERIAL: 95 % (ref 93–100)
PCO2 ARTERIAL: 46 MM HG (ref 35–45)
PERFORMED ON: ABNORMAL
PERFORMED ON: ABNORMAL
PH ARTERIAL: 7.43 (ref 7.35–7.45)
PH UR STRIP: 7.5 [PH] (ref 5–9)
PLATELET # BLD AUTO: 202 K/UL (ref 130–400)
PO2 ARTERIAL: 72 MM HG (ref 75–108)
POC CHLORIDE: 102 MEQ/L (ref 99–110)
POC CREATININE: 0.8 MG/DL (ref 0.8–1.3)
POC FIO2: 3
POC SAMPLE TYPE: ABNORMAL
POTASSIUM SERPL-SCNC: 3.5 MEQ/L (ref 3.5–5.1)
POTASSIUM SERPL-SCNC: 3.7 MEQ/L (ref 3.4–4.9)
PROCALCITONIN SERPL IA-MCNC: 1.88 NG/ML (ref 0–0.15)
PROT SERPL-MCNC: 6.3 G/DL (ref 6.3–8)
PROT UR STRIP-MCNC: 30 MG/DL
RBC # BLD AUTO: 4.18 M/UL (ref 4.7–6.1)
RBC #/AREA URNS AUTO: ABNORMAL /HPF (ref 0–5)
SARS-COV-2 RDRP RESP QL NAA+PROBE: NOT DETECTED
SODIUM BLD-SCNC: 141 MEQ/L (ref 136–145)
SODIUM SERPL-SCNC: 140 MEQ/L (ref 135–144)
SP GR UR STRIP: 1.02 (ref 1–1.03)
TCO2 ARTERIAL: 32 MMOL/L (ref 21–32)
UROBILINOGEN UR STRIP-ACNC: 2 E.U./DL
WBC # BLD AUTO: 12.6 K/UL (ref 4.8–10.8)
WBC #/AREA URNS AUTO: ABNORMAL /HPF (ref 0–5)

## 2023-10-24 PROCEDURE — 93005 ELECTROCARDIOGRAM TRACING: CPT | Performed by: EMERGENCY MEDICINE

## 2023-10-24 PROCEDURE — 83735 ASSAY OF MAGNESIUM: CPT

## 2023-10-24 PROCEDURE — 82330 ASSAY OF CALCIUM: CPT

## 2023-10-24 PROCEDURE — 94761 N-INVAS EAR/PLS OXIMETRY MLT: CPT

## 2023-10-24 PROCEDURE — 84295 ASSAY OF SERUM SODIUM: CPT

## 2023-10-24 PROCEDURE — 87502 INFLUENZA DNA AMP PROBE: CPT

## 2023-10-24 PROCEDURE — 36415 COLL VENOUS BLD VENIPUNCTURE: CPT

## 2023-10-24 PROCEDURE — 70450 CT HEAD/BRAIN W/O DYE: CPT

## 2023-10-24 PROCEDURE — 36600 WITHDRAWAL OF ARTERIAL BLOOD: CPT

## 2023-10-24 PROCEDURE — 94640 AIRWAY INHALATION TREATMENT: CPT

## 2023-10-24 PROCEDURE — 87040 BLOOD CULTURE FOR BACTERIA: CPT

## 2023-10-24 PROCEDURE — 82565 ASSAY OF CREATININE: CPT

## 2023-10-24 PROCEDURE — 83880 ASSAY OF NATRIURETIC PEPTIDE: CPT

## 2023-10-24 PROCEDURE — 2580000003 HC RX 258: Performed by: INTERNAL MEDICINE

## 2023-10-24 PROCEDURE — 93970 EXTREMITY STUDY: CPT

## 2023-10-24 PROCEDURE — 83036 HEMOGLOBIN GLYCOSYLATED A1C: CPT

## 2023-10-24 PROCEDURE — 84132 ASSAY OF SERUM POTASSIUM: CPT

## 2023-10-24 PROCEDURE — 82435 ASSAY OF BLOOD CHLORIDE: CPT

## 2023-10-24 PROCEDURE — 99285 EMERGENCY DEPT VISIT HI MDM: CPT

## 2023-10-24 PROCEDURE — 83605 ASSAY OF LACTIC ACID: CPT

## 2023-10-24 PROCEDURE — 81001 URINALYSIS AUTO W/SCOPE: CPT

## 2023-10-24 PROCEDURE — 85014 HEMATOCRIT: CPT

## 2023-10-24 PROCEDURE — 71045 X-RAY EXAM CHEST 1 VIEW: CPT

## 2023-10-24 PROCEDURE — 1210000000 HC MED SURG R&B

## 2023-10-24 PROCEDURE — 87635 SARS-COV-2 COVID-19 AMP PRB: CPT

## 2023-10-24 PROCEDURE — 80053 COMPREHEN METABOLIC PANEL: CPT

## 2023-10-24 PROCEDURE — 85025 COMPLETE CBC W/AUTO DIFF WBC: CPT

## 2023-10-24 PROCEDURE — 84145 PROCALCITONIN (PCT): CPT

## 2023-10-24 PROCEDURE — 6370000000 HC RX 637 (ALT 250 FOR IP): Performed by: INTERNAL MEDICINE

## 2023-10-24 PROCEDURE — 6360000002 HC RX W HCPCS: Performed by: INTERNAL MEDICINE

## 2023-10-24 PROCEDURE — 6360000002 HC RX W HCPCS: Performed by: EMERGENCY MEDICINE

## 2023-10-24 PROCEDURE — 2580000003 HC RX 258: Performed by: EMERGENCY MEDICINE

## 2023-10-24 PROCEDURE — 82803 BLOOD GASES ANY COMBINATION: CPT

## 2023-10-24 PROCEDURE — 96365 THER/PROPH/DIAG IV INF INIT: CPT

## 2023-10-24 RX ORDER — 0.9 % SODIUM CHLORIDE 0.9 %
500 INTRAVENOUS SOLUTION INTRAVENOUS ONCE
Status: COMPLETED | OUTPATIENT
Start: 2023-10-24 | End: 2023-10-24

## 2023-10-24 RX ORDER — GUAIFENESIN 600 MG/1
600 TABLET, EXTENDED RELEASE ORAL 2 TIMES DAILY
Status: DISCONTINUED | OUTPATIENT
Start: 2023-10-24 | End: 2023-10-28 | Stop reason: HOSPADM

## 2023-10-24 RX ORDER — SODIUM CHLORIDE 9 MG/ML
INJECTION, SOLUTION INTRAVENOUS PRN
Status: DISCONTINUED | OUTPATIENT
Start: 2023-10-24 | End: 2023-10-28 | Stop reason: HOSPADM

## 2023-10-24 RX ORDER — ASPIRIN 81 MG/1
81 TABLET, CHEWABLE ORAL DAILY
Status: DISCONTINUED | OUTPATIENT
Start: 2023-10-24 | End: 2023-10-28 | Stop reason: HOSPADM

## 2023-10-24 RX ORDER — INSULIN LISPRO 100 [IU]/ML
0-8 INJECTION, SOLUTION INTRAVENOUS; SUBCUTANEOUS
Status: DISCONTINUED | OUTPATIENT
Start: 2023-10-24 | End: 2023-10-28 | Stop reason: HOSPADM

## 2023-10-24 RX ORDER — NYSTATIN 100000 [USP'U]/G
POWDER TOPICAL 4 TIMES DAILY
COMMUNITY

## 2023-10-24 RX ORDER — ACETAMINOPHEN 650 MG/1
650 SUPPOSITORY RECTAL EVERY 6 HOURS PRN
Status: DISCONTINUED | OUTPATIENT
Start: 2023-10-24 | End: 2023-10-28 | Stop reason: HOSPADM

## 2023-10-24 RX ORDER — ACETAMINOPHEN 325 MG/1
650 TABLET ORAL EVERY 6 HOURS PRN
COMMUNITY

## 2023-10-24 RX ORDER — ACETAMINOPHEN 325 MG/1
650 TABLET ORAL EVERY 6 HOURS PRN
Status: DISCONTINUED | OUTPATIENT
Start: 2023-10-24 | End: 2023-10-28 | Stop reason: HOSPADM

## 2023-10-24 RX ORDER — SODIUM CHLORIDE 0.9 % (FLUSH) 0.9 %
5-40 SYRINGE (ML) INJECTION PRN
Status: DISCONTINUED | OUTPATIENT
Start: 2023-10-24 | End: 2023-10-28 | Stop reason: HOSPADM

## 2023-10-24 RX ORDER — POLYETHYLENE GLYCOL 3350 17 G/17G
17 POWDER, FOR SOLUTION ORAL DAILY
Status: COMPLETED | OUTPATIENT
Start: 2023-10-24 | End: 2023-10-25

## 2023-10-24 RX ORDER — GUAIFENESIN/DEXTROMETHORPHAN 100-10MG/5
5 SYRUP ORAL EVERY 4 HOURS PRN
Status: DISCONTINUED | OUTPATIENT
Start: 2023-10-24 | End: 2023-10-28 | Stop reason: HOSPADM

## 2023-10-24 RX ORDER — PRAVASTATIN SODIUM 40 MG
80 TABLET ORAL EVERY EVENING
Status: DISCONTINUED | OUTPATIENT
Start: 2023-10-24 | End: 2023-10-28 | Stop reason: HOSPADM

## 2023-10-24 RX ORDER — INSULIN LISPRO 100 [IU]/ML
0-4 INJECTION, SOLUTION INTRAVENOUS; SUBCUTANEOUS NIGHTLY
Status: DISCONTINUED | OUTPATIENT
Start: 2023-10-24 | End: 2023-10-28 | Stop reason: HOSPADM

## 2023-10-24 RX ORDER — IPRATROPIUM BROMIDE AND ALBUTEROL SULFATE 2.5; .5 MG/3ML; MG/3ML
1 SOLUTION RESPIRATORY (INHALATION)
Status: DISCONTINUED | OUTPATIENT
Start: 2023-10-24 | End: 2023-10-25

## 2023-10-24 RX ORDER — GLUCAGON 1 MG/ML
1 KIT INJECTION PRN
Status: DISCONTINUED | OUTPATIENT
Start: 2023-10-24 | End: 2023-10-28 | Stop reason: HOSPADM

## 2023-10-24 RX ORDER — DEXTROSE MONOHYDRATE 100 MG/ML
INJECTION, SOLUTION INTRAVENOUS CONTINUOUS PRN
Status: DISCONTINUED | OUTPATIENT
Start: 2023-10-24 | End: 2023-10-28 | Stop reason: HOSPADM

## 2023-10-24 RX ORDER — LEVOFLOXACIN 5 MG/ML
750 INJECTION, SOLUTION INTRAVENOUS EVERY 24 HOURS
Status: DISCONTINUED | OUTPATIENT
Start: 2023-10-24 | End: 2023-10-24

## 2023-10-24 RX ORDER — ENOXAPARIN SODIUM 100 MG/ML
40 INJECTION SUBCUTANEOUS DAILY
Status: DISCONTINUED | OUTPATIENT
Start: 2023-10-24 | End: 2023-10-28 | Stop reason: HOSPADM

## 2023-10-24 RX ORDER — SODIUM CHLORIDE 0.9 % (FLUSH) 0.9 %
5-40 SYRINGE (ML) INJECTION EVERY 12 HOURS SCHEDULED
Status: DISCONTINUED | OUTPATIENT
Start: 2023-10-24 | End: 2023-10-28 | Stop reason: HOSPADM

## 2023-10-24 RX ORDER — METOPROLOL SUCCINATE 25 MG/1
25 TABLET, EXTENDED RELEASE ORAL DAILY
Status: DISCONTINUED | OUTPATIENT
Start: 2023-10-24 | End: 2023-10-28 | Stop reason: HOSPADM

## 2023-10-24 RX ORDER — 0.9 % SODIUM CHLORIDE 0.9 %
1000 INTRAVENOUS SOLUTION INTRAVENOUS ONCE
Status: COMPLETED | OUTPATIENT
Start: 2023-10-24 | End: 2023-10-24

## 2023-10-24 RX ORDER — KETOROLAC TROMETHAMINE 15 MG/ML
15 INJECTION, SOLUTION INTRAMUSCULAR; INTRAVENOUS ONCE
Status: COMPLETED | OUTPATIENT
Start: 2023-10-24 | End: 2023-10-24

## 2023-10-24 RX ADMIN — VANCOMYCIN HYDROCHLORIDE 750 MG: 750 INJECTION, POWDER, LYOPHILIZED, FOR SOLUTION INTRAVENOUS at 23:03

## 2023-10-24 RX ADMIN — IPRATROPIUM BROMIDE AND ALBUTEROL SULFATE 1 DOSE: 2.5; .5 SOLUTION RESPIRATORY (INHALATION) at 15:16

## 2023-10-24 RX ADMIN — GUAIFENESIN 600 MG: 600 TABLET, EXTENDED RELEASE ORAL at 20:55

## 2023-10-24 RX ADMIN — SODIUM CHLORIDE, PRESERVATIVE FREE 10 ML: 5 INJECTION INTRAVENOUS at 19:40

## 2023-10-24 RX ADMIN — PIPERACILLIN AND TAZOBACTAM 3375 MG: 3; .375 INJECTION, POWDER, LYOPHILIZED, FOR SOLUTION INTRAVENOUS at 19:40

## 2023-10-24 RX ADMIN — ASPIRIN 81 MG CHEWABLE TABLET 81 MG: 81 TABLET CHEWABLE at 18:59

## 2023-10-24 RX ADMIN — ENOXAPARIN SODIUM 40 MG: 100 INJECTION SUBCUTANEOUS at 19:01

## 2023-10-24 RX ADMIN — SODIUM CHLORIDE 1000 ML: 9 INJECTION, SOLUTION INTRAVENOUS at 09:42

## 2023-10-24 RX ADMIN — KETOROLAC TROMETHAMINE 15 MG: 15 INJECTION, SOLUTION INTRAMUSCULAR; INTRAVENOUS at 11:45

## 2023-10-24 RX ADMIN — VANCOMYCIN HYDROCHLORIDE 1750 MG: 1 INJECTION, POWDER, LYOPHILIZED, FOR SOLUTION INTRAVENOUS at 11:29

## 2023-10-24 RX ADMIN — SODIUM CHLORIDE 500 ML: 9 INJECTION, SOLUTION INTRAVENOUS at 18:58

## 2023-10-24 RX ADMIN — PIPERACILLIN AND TAZOBACTAM 3375 MG: 3; .375 INJECTION, POWDER, LYOPHILIZED, FOR SOLUTION INTRAVENOUS at 10:23

## 2023-10-24 RX ADMIN — METOPROLOL SUCCINATE 25 MG: 25 TABLET, EXTENDED RELEASE ORAL at 18:58

## 2023-10-24 RX ADMIN — POLYETHYLENE GLYCOL 3350 17 G: 17 POWDER, FOR SOLUTION ORAL at 18:59

## 2023-10-24 RX ADMIN — GUAIFENESIN 600 MG: 600 TABLET, EXTENDED RELEASE ORAL at 18:58

## 2023-10-24 RX ADMIN — PRAVASTATIN SODIUM 80 MG: 40 TABLET ORAL at 19:02

## 2023-10-24 ASSESSMENT — PAIN DESCRIPTION - LOCATION: LOCATION: GENERALIZED

## 2023-10-24 ASSESSMENT — PAIN SCALES - GENERAL: PAINLEVEL_OUTOF10: 0

## 2023-10-24 NOTE — ED NOTES
Patient presents to the ER via [id number] from [facility] with tremors, increased confusion and agitation, warm to touch, and weakness. Pt is normally baseline confused but has increased confusion and agitation. Patient does have a tremor to bilateral hands and mouth at times. Highest temperature 98.8  Tachy 115 A&O to name only. Swelling to bilateral lower exts, with +2 pitting edema. Pt was 79% on RA at lowest, placed on 3L. Congested cough. Oral mucosa dry. Complains of pain all over.       Ketty Lamar, RN  10/24/23 9986

## 2023-10-24 NOTE — ED NOTES
Per Steven Funez, , send a green top for add on BNP  Green tube sent      Berny Wilson RN  10/24/23 3074

## 2023-10-24 NOTE — H&P
Hospital Medicine  History and Physical    Patient:  Adalberto Bonds  MRN: [id number]    CHIEF COMPLAINT:    Chief Complaint   Patient presents with    Fever     Staff at [facility] found him more confused, warm to touch, weakness, tremors       History Obtained From:  Patient, EMR  Primary Care Physician: Quentin Osler, MD    HISTORY OF PRESENT ILLNESS:   80-year-old male with past medical history of dementia, chronic diastolic heart failure, suspected severe aortic stenosis presents from his assisted living facility with confusion, paranoia, weakness. In the ED, he was found to be febrile to 100.6 and hypoxic to 79% on room air. He has had 2 falls in the last 2 weeks with the last being on Sunday-his daughter reports that since he received morphine in the ED he has been experiencing twitching that has worsened. Today, he kept screaming \" I am going to fall\" when he was already sitting. Past Medical History:      Diagnosis Date    Bilateral lower extremity edema     Carotid stenosis     Dementia (HCC)     Diastolic CHF, acute on chronic (HCC)     mitral stenosis    FH: carotid endarterectomy     Hiatal hernia     Hypoxia     Loss of coordination     Muscle weakness     Pharyngeal dysphagia     Pneumonia        Past Surgical History:  History reviewed. No pertinent surgical history. Medications Prior to Admission:    Prior to Admission medications    Medication Sig Start Date End Date Taking? Authorizing Provider   nystatin (MYCOSTATIN) 803356 UNIT/GM powder Apply topically 4 times daily Apply topically 4 times daily.    Yes ProviderBelen MD   acetaminophen (TYLENOL) 325 MG tablet Take 2 tablets by mouth every 6 hours as needed for Pain or Fever   Yes ProviderBelen MD   pravastatin (PRAVACHOL) 80 MG tablet Take 1 tablet by mouth every evening 4/7/23 4/6/24  Nii Gay MD   aspirin 81 MG chewable tablet Take 1 tablet by mouth daily  Patient taking differently: Take 1

## 2023-10-24 NOTE — ED NOTES
Per Dr Riley Mckay, okay to hold off straight cath, external catheter applied to attempt to obtain urine        Antoinette Bang RN  10/24/23 9171

## 2023-10-24 NOTE — ED PROVIDER NOTES
9:48 AM EDT  [facility] ED  EMERGENCY DEPARTMENT ENCOUNTER      Pt Name: Keily Moreno  MRN: [id number]  [address] [facility] 11/30/1932  Date of evaluation: 10/24/2023  Provider: Cindy Garcia MD    CHIEF COMPLAINT       Chief Complaint   Patient presents with    Fever     Staff at [facility] found him more confused, warm to touch, weakness, tremors         HISTORY OF PRESENT ILLNESS   (Location/Symptom, Timing/Onset, Context/Setting, Quality, Duration, Modifying Factors, Severity)  Note limiting factors. 20-year-old male presenting with decreased functionality. Normally is ambulatory and no longer able to do that today. Facility also notes tremors which are reportedly new. Patient has dementia at baseline and may be slightly more confused though he is reportedly alert to self only at his baseline. History is difficult to obtain from the patient. Not on any supplemental O2. Reported cough. Nursing Notes were reviewed. REVIEW OF SYSTEMS    (2-9 systems for level 4, 10 or more for level 5)     Review of Systems   Unable to perform ROS: Dementia       Except as noted above the remainder of the review of systems was reviewed and negative. PAST MEDICAL HISTORY     Past Medical History:   Diagnosis Date    Bilateral lower extremity edema     Carotid stenosis     Dementia (HCC)     Diastolic CHF, acute on chronic (HCC)     mitral stenosis    FH: carotid endarterectomy     Hiatal hernia     Hypoxia     Loss of coordination     Muscle weakness     Pharyngeal dysphagia     Pneumonia          SURGICAL HISTORY     History reviewed. No pertinent surgical history.       CURRENT MEDICATIONS       Previous Medications    ASPIRIN 81 MG CHEWABLE TABLET    Take 1 tablet by mouth daily    IPRATROPIUM-ALBUTEROL (DUONEB) 0.5-2.5 (3) MG/3ML SOLN NEBULIZER SOLUTION    Inhale 3 mLs into the lungs every 4 hours as needed for Shortness of Breath    ISOSORBIDE MONONITRATE (IMDUR) 30 MG EXTENDED General: Bowel sounds are normal.      Palpations: Abdomen is soft. Tenderness: There is no abdominal tenderness. There is no guarding or rebound. Musculoskeletal:         General: No deformity. Normal range of motion. Cervical back: Normal range of motion and neck supple. Skin:     General: Skin is warm and dry. Capillary Refill: Capillary refill takes less than 2 seconds. Neurological:      General: No focal deficit present. Mental Status: He is alert. Mental status is at baseline. Cranial Nerves: No cranial nerve deficit. Psychiatric:      Comments: delirious         DIAGNOSTIC RESULTS     EKG: All EKG's are interpreted by the Emergency Department Physician who either signs or Co-signs this chart in the absence of a cardiologist.    Sinus tachy, rate 108, normal intervals, no ST elevation/ depression    RADIOLOGY:   Non-plain film images such as CT, Ultrasound and MRI are read by the radiologist. Plain radiographic images are visualized and preliminarily interpreted by the emergency physician with the below findings:    Interpretation per the Radiologist below, if available at the time of this note:    CT Head W/O Contrast   Final Result   No acute intracranial abnormality. Advanced chronic senescent change         XR CHEST PORTABLE   Final Result   Very large hiatal hernia as before. No new abnormal findings.              LABS:  Labs Reviewed   CBC WITH AUTO DIFFERENTIAL - Abnormal; Notable for the following components:       Result Value    WBC 12.6 (*)     RBC 4.18 (*)     Hemoglobin 12.9 (*)     Hematocrit 39.2 (*)     MCV 93.8 (*)     MCHC 32.9 (*)     Neutrophils Absolute 10.1 (*)     All other components within normal limits   COMPREHENSIVE METABOLIC PANEL W/ REFLEX TO MG FOR LOW K - Abnormal; Notable for the following components:    CO2 33 (*)     Anion Gap 7 (*)     Glucose 200 (*)     Calcium 8.4 (*)     Albumin 3.2 (*)     Total Bilirubin 1.5 (*)     All other

## 2023-10-24 NOTE — CARE COORDINATION
[facility] Patient on Iv Levaquin for Pneumonia. Definition of pneumonia discussed. Causes of different types of pneumonia reviewed. Symptoms discussed and pt does understand that they may have some or all of these symptoms. Testing to diagnose pneumonia reviewed as well as possible treatments. Importance of avoiding infections discussed including: taking medication as directed, washing hands, disposing of used tissues, getting the pneumonia and flu vaccines, and avoiding others who are ill. Importance of antibiotics explained to the patient. Importance of not smoking and to avoid others who may be smoking around patient stressed. Pneumonia Zones also reviewed. \"Green\" zone is the goal, \"Yellow\" zone means to call the doctor, and \"Red\" zone means to call the doctor ASAP or call 911. Copies of Pneumonia booklet and Zone Pamphlet given to patient. Offer for patient to express any concerns or questions. Pt denies having further questions at this time.

## 2023-10-24 NOTE — ED NOTES
Report given to [address] [place] [address]     Giancarlo Bi, [address]  10/24/23 9066

## 2023-10-24 NOTE — ED NOTES
Patient urinated via urinal when this RN was setting up to straight cath patient  Urine obtained and sent      Sean Milligan RN  10/24/23 1100

## 2023-10-25 ENCOUNTER — APPOINTMENT (OUTPATIENT)
Age: 88
DRG: 871 | End: 2023-10-25
Attending: INTERNAL MEDICINE
Payer: MEDICARE

## 2023-10-25 LAB
ANION GAP SERPL CALCULATED.3IONS-SCNC: 10 MEQ/L (ref 9–15)
BASOPHILS # BLD: 0.1 K/UL (ref 0–0.2)
BASOPHILS NFR BLD: 0.4 %
BUN SERPL-MCNC: 22 MG/DL (ref 8–23)
CALCIUM SERPL-MCNC: 7.8 MG/DL (ref 8.5–9.9)
CHLORIDE SERPL-SCNC: 106 MEQ/L (ref 95–107)
CO2 SERPL-SCNC: 26 MEQ/L (ref 20–31)
CREAT SERPL-MCNC: 0.88 MG/DL (ref 0.7–1.2)
ECHO AO ROOT DIAM: 3.1 CM
ECHO AO ROOT INDEX: 1.46 CM/M2
ECHO AV AREA PEAK VELOCITY: 1.1 CM2
ECHO AV AREA VTI: 1.3 CM2
ECHO AV AREA/BSA PEAK VELOCITY: 0.5 CM2/M2
ECHO AV AREA/BSA VTI: 0.6 CM2/M2
ECHO AV CUSP MM: 0.4 CM
ECHO AV MEAN GRADIENT: 24 MMHG
ECHO AV MEAN VELOCITY: 2.3 M/S
ECHO AV PEAK GRADIENT: 37 MMHG
ECHO AV PEAK VELOCITY: 3 M/S
ECHO AV VELOCITY RATIO: 0.37
ECHO AV VTI: 61.7 CM
ECHO BSA: 2.15 M2
ECHO LA VOL 4C: 74 ML (ref 18–58)
ECHO LA VOL 4C: 80 ML (ref 18–58)
ECHO LV E' LATERAL VELOCITY: 8 CM/S
ECHO LV E' SEPTAL VELOCITY: 5 CM/S
ECHO LV EDV A2C: 41 ML
ECHO LV EDV A4C: 82 ML
ECHO LV EDV BP: 65 ML (ref 67–155)
ECHO LV EDV INDEX A4C: 38 ML/M2
ECHO LV EDV INDEX BP: 31 ML/M2
ECHO LV EDV NDEX A2C: 19 ML/M2
ECHO LV EJECTION FRACTION A2C: 42 %
ECHO LV EJECTION FRACTION A4C: 53 %
ECHO LV EJECTION FRACTION BIPLANE: 51 % (ref 55–100)
ECHO LV ESV A2C: 24 ML
ECHO LV ESV A4C: 38 ML
ECHO LV ESV BP: 32 ML (ref 22–58)
ECHO LV ESV INDEX A2C: 11 ML/M2
ECHO LV ESV INDEX A4C: 18 ML/M2
ECHO LV ESV INDEX BP: 15 ML/M2
ECHO LV FRACTIONAL SHORTENING: 26 % (ref 28–44)
ECHO LV INTERNAL DIMENSION DIASTOLE INDEX: 2.02 CM/M2
ECHO LV INTERNAL DIMENSION DIASTOLIC: 4.3 CM (ref 4.2–5.9)
ECHO LV INTERNAL DIMENSION SYSTOLIC INDEX: 1.5 CM/M2
ECHO LV INTERNAL DIMENSION SYSTOLIC: 3.2 CM
ECHO LV IVSD: 1.3 CM (ref 0.6–1)
ECHO LV IVSS: 1.4 CM
ECHO LV MASS 2D: 184.7 G (ref 88–224)
ECHO LV MASS INDEX 2D: 86.7 G/M2 (ref 49–115)
ECHO LV POSTERIOR WALL DIASTOLIC: 1.1 CM (ref 0.6–1)
ECHO LV POSTERIOR WALL SYSTOLIC: 1.3 CM
ECHO LV RELATIVE WALL THICKNESS RATIO: 0.51
ECHO LVOT AREA: 3.1 CM2
ECHO LVOT AV VTI INDEX: 0.39
ECHO LVOT DIAM: 2 CM
ECHO LVOT MEAN GRADIENT: 2 MMHG
ECHO LVOT PEAK GRADIENT: 3 MMHG
ECHO LVOT PEAK VELOCITY: 1.1 M/S
ECHO LVOT STROKE VOLUME INDEX: 35.1 ML/M2
ECHO LVOT SV: 74.7 ML
ECHO LVOT VTI: 23.8 CM
ECHO MV A VELOCITY: 1.72 M/S
ECHO MV AREA VTI: 2 CM2
ECHO MV E DECELERATION TIME (DT): 312.8 MS
ECHO MV E VELOCITY: 1.26 M/S
ECHO MV E/A RATIO: 0.73
ECHO MV E/E' LATERAL: 15.75
ECHO MV E/E' RATIO (AVERAGED): 20.48
ECHO MV E/E' SEPTAL: 25.2
ECHO MV LVOT VTI INDEX: 1.54
ECHO MV MAX VELOCITY: 2 M/S
ECHO MV MEAN GRADIENT: 7 MMHG
ECHO MV MEAN VELOCITY: 1.2 M/S
ECHO MV PEAK GRADIENT: 16 MMHG
ECHO MV REGURGITANT PEAK GRADIENT: 130 MMHG
ECHO MV REGURGITANT PEAK VELOCITY: 5.7 M/S
ECHO MV VTI: 36.6 CM
ECHO RV INTERNAL DIMENSION: 2.8 CM
ECHO RV TAPSE: 1.9 CM (ref 1.7–?)
EOSINOPHIL # BLD: 1 K/UL (ref 0–0.7)
EOSINOPHIL NFR BLD: 8.6 %
ERYTHROCYTE [DISTWIDTH] IN BLOOD BY AUTOMATED COUNT: 13.2 % (ref 11.5–14.5)
GLUCOSE BLD-MCNC: 185 MG/DL (ref 70–99)
GLUCOSE BLD-MCNC: 204 MG/DL (ref 70–99)
GLUCOSE BLD-MCNC: 206 MG/DL (ref 70–99)
GLUCOSE SERPL-MCNC: 162 MG/DL (ref 70–99)
HCT VFR BLD AUTO: 34.8 % (ref 42–52)
HGB BLD-MCNC: 11.3 G/DL (ref 14–18)
LYMPHOCYTES # BLD: 1.1 K/UL (ref 1–4.8)
LYMPHOCYTES NFR BLD: 9.6 %
MCH RBC QN AUTO: 30.5 PG (ref 27–31.3)
MCHC RBC AUTO-ENTMCNC: 32.5 % (ref 33–37)
MCV RBC AUTO: 93.8 FL (ref 79–92.2)
MONOCYTES # BLD: 0.7 K/UL (ref 0.2–0.8)
MONOCYTES NFR BLD: 6.2 %
NEUTROPHILS # BLD: 8.7 K/UL (ref 1.4–6.5)
NEUTS SEG NFR BLD: 74.8 %
PERFORMED ON: ABNORMAL
PHOSPHATE SERPL-MCNC: 3.3 MG/DL (ref 2.3–4.8)
PLATELET # BLD AUTO: 173 K/UL (ref 130–400)
POTASSIUM SERPL-SCNC: 3.7 MEQ/L (ref 3.4–4.9)
RBC # BLD AUTO: 3.71 M/UL (ref 4.7–6.1)
SODIUM SERPL-SCNC: 142 MEQ/L (ref 135–144)
VANCOMYCIN SERPL-MCNC: 19.8 UG/ML (ref 10–40)
WBC # BLD AUTO: 11.6 K/UL (ref 4.8–10.8)

## 2023-10-25 PROCEDURE — 85025 COMPLETE CBC W/AUTO DIFF WBC: CPT

## 2023-10-25 PROCEDURE — 97166 OT EVAL MOD COMPLEX 45 MIN: CPT

## 2023-10-25 PROCEDURE — 93306 TTE W/DOPPLER COMPLETE: CPT | Performed by: INTERNAL MEDICINE

## 2023-10-25 PROCEDURE — 80048 BASIC METABOLIC PNL TOTAL CA: CPT

## 2023-10-25 PROCEDURE — 2700000000 HC OXYGEN THERAPY PER DAY

## 2023-10-25 PROCEDURE — 84100 ASSAY OF PHOSPHORUS: CPT

## 2023-10-25 PROCEDURE — 94640 AIRWAY INHALATION TREATMENT: CPT

## 2023-10-25 PROCEDURE — 36415 COLL VENOUS BLD VENIPUNCTURE: CPT

## 2023-10-25 PROCEDURE — 80202 ASSAY OF VANCOMYCIN: CPT

## 2023-10-25 PROCEDURE — 93306 TTE W/DOPPLER COMPLETE: CPT

## 2023-10-25 PROCEDURE — 6370000000 HC RX 637 (ALT 250 FOR IP): Performed by: INTERNAL MEDICINE

## 2023-10-25 PROCEDURE — 6360000002 HC RX W HCPCS: Performed by: INTERNAL MEDICINE

## 2023-10-25 PROCEDURE — 94761 N-INVAS EAR/PLS OXIMETRY MLT: CPT

## 2023-10-25 PROCEDURE — 1210000000 HC MED SURG R&B

## 2023-10-25 PROCEDURE — 92610 EVALUATE SWALLOWING FUNCTION: CPT

## 2023-10-25 PROCEDURE — 2580000003 HC RX 258: Performed by: INTERNAL MEDICINE

## 2023-10-25 RX ORDER — SODIUM CHLORIDE 9 MG/ML
INJECTION, SOLUTION INTRAVENOUS
Status: COMPLETED
Start: 2023-10-25 | End: 2023-10-25

## 2023-10-25 RX ORDER — IPRATROPIUM BROMIDE AND ALBUTEROL SULFATE 2.5; .5 MG/3ML; MG/3ML
1 SOLUTION RESPIRATORY (INHALATION)
Status: DISCONTINUED | OUTPATIENT
Start: 2023-10-25 | End: 2023-10-28 | Stop reason: HOSPADM

## 2023-10-25 RX ORDER — MECOBALAMIN 5000 MCG
5 TABLET,DISINTEGRATING ORAL NIGHTLY
Status: DISCONTINUED | OUTPATIENT
Start: 2023-10-25 | End: 2023-10-28 | Stop reason: HOSPADM

## 2023-10-25 RX ADMIN — POLYETHYLENE GLYCOL 3350 17 G: 17 POWDER, FOR SOLUTION ORAL at 20:44

## 2023-10-25 RX ADMIN — PIPERACILLIN AND TAZOBACTAM 3375 MG: 3; .375 INJECTION, POWDER, LYOPHILIZED, FOR SOLUTION INTRAVENOUS at 02:20

## 2023-10-25 RX ADMIN — PIPERACILLIN AND TAZOBACTAM 3375 MG: 3; .375 INJECTION, POWDER, LYOPHILIZED, FOR SOLUTION INTRAVENOUS at 12:40

## 2023-10-25 RX ADMIN — GUAIFENESIN 600 MG: 600 TABLET, EXTENDED RELEASE ORAL at 08:53

## 2023-10-25 RX ADMIN — METOPROLOL SUCCINATE 25 MG: 25 TABLET, EXTENDED RELEASE ORAL at 08:53

## 2023-10-25 RX ADMIN — PRAVASTATIN SODIUM 80 MG: 40 TABLET ORAL at 18:02

## 2023-10-25 RX ADMIN — PIPERACILLIN AND TAZOBACTAM 3375 MG: 3; .375 INJECTION, POWDER, LYOPHILIZED, FOR SOLUTION INTRAVENOUS at 20:52

## 2023-10-25 RX ADMIN — IPRATROPIUM BROMIDE AND ALBUTEROL SULFATE 1 DOSE: 2.5; .5 SOLUTION RESPIRATORY (INHALATION) at 13:57

## 2023-10-25 RX ADMIN — INSULIN LISPRO 2 UNITS: 100 INJECTION, SOLUTION INTRAVENOUS; SUBCUTANEOUS at 13:15

## 2023-10-25 RX ADMIN — ENOXAPARIN SODIUM 40 MG: 100 INJECTION SUBCUTANEOUS at 18:02

## 2023-10-25 RX ADMIN — IPRATROPIUM BROMIDE AND ALBUTEROL SULFATE 1 DOSE: 2.5; .5 SOLUTION RESPIRATORY (INHALATION) at 07:40

## 2023-10-25 RX ADMIN — VANCOMYCIN HYDROCHLORIDE 750 MG: 750 INJECTION, POWDER, LYOPHILIZED, FOR SOLUTION INTRAVENOUS at 11:21

## 2023-10-25 RX ADMIN — SODIUM CHLORIDE, PRESERVATIVE FREE 10 ML: 5 INJECTION INTRAVENOUS at 08:54

## 2023-10-25 RX ADMIN — INSULIN LISPRO 2 UNITS: 100 INJECTION, SOLUTION INTRAVENOUS; SUBCUTANEOUS at 18:02

## 2023-10-25 RX ADMIN — Medication 5 MG: at 20:27

## 2023-10-25 RX ADMIN — GUAIFENESIN 600 MG: 600 TABLET, EXTENDED RELEASE ORAL at 20:26

## 2023-10-25 RX ADMIN — SODIUM CHLORIDE, PRESERVATIVE FREE 10 ML: 5 INJECTION INTRAVENOUS at 20:44

## 2023-10-25 RX ADMIN — SODIUM CHLORIDE: 9 INJECTION, SOLUTION INTRAVENOUS at 11:22

## 2023-10-25 RX ADMIN — ASPIRIN 81 MG CHEWABLE TABLET 81 MG: 81 TABLET CHEWABLE at 08:53

## 2023-10-25 ASSESSMENT — PAIN SCALES - GENERAL
PAINLEVEL_OUTOF10: 0
PAINLEVEL_OUTOF10: 0

## 2023-10-25 NOTE — PROGRESS NOTES
Patient yelling and upset said he had not spoken to his family in weeks. Dialed and patient spoke with his wife. Video monitoring started. Will continue to monitor.

## 2023-10-25 NOTE — FLOWSHEET NOTE
Am nursing  assessment completed. Pt : awake, restless, noted tremors                Alert and oriented. Diet: assist/ NECTAR/ minced and moist  RESP:  even and non labored             Lung sounds:   diminished        Oxygen: 2L NC  Complaints of:  generalized fatigue and tremors  IV:  sl x 2             patent/ flushed/ capped, no signs of infiltration noted, dressing clean/dry/intact. TELE:   SR              Dressings:  left elbow/ knee                         Precautions:              Falls: 80       Emir: 16  Chart and meds reviewed. Noted Labs: na 142 k 3.7 phos 3.3   Plan for today: bedside echo completed    Bed wheels locked and in lowest position. Call light and bedside table within reach. NOTES:  OT by for rounds. Complete bath and linen change, heavy 2 assist for bed mobility. Ext cath applied. 1 Speech by for bedside eval. Pt yelling out frequently for assistance. Spoke to spouse on phone and updated. 1200 Dr Beto Ornelas by for rounds. Daughter at bedside. Electronically signed by Dixon Ventura RN on 10/25/2023 at 12:01 PM    [phone] assist w dinner. 2 assist reposition and incont brief change. Pt resists care and turning. Electronically signed by Dixon Ventura RN on 10/25/2023 at 7:00 PM     1900 noted increased agitation, restless, yelling. Attempt reorientation. Will order AVASYS per protocol.  Electronically signed by Dixon Ventura RN on 10/25/2023 at 7:34 PM

## 2023-10-25 NOTE — PROGRESS NOTES
[facility]   Facility/Department: [facility]  Speech Language Pathology  Clinical Bedside Swallow Evaluation    NAME:Navarro Price  : 1932 (80 y.o.)   [x]   confirmed    MRN: [id number]  ROOM: [place] [address]  ADMISSION DATE: 10/24/2023  PATIENT DIAGNOSIS(ES): Hypoxia [R09.02]  General weakness [R53.1]  Febrile illness [R50.9]  Sepsis due to pneumonia ([address]) [J18.9, A41.9]  Pneumonia due to infectious organism, unspecified laterality, unspecified part of lung [J18.9]  Chief Complaint   Patient presents with    Fever     Staff at [facility] found him more confused, warm to touch, weakness, tremors     Patient Active Problem List    Diagnosis Date Noted    Community acquired pneumonia due to Mycoplasma pneumoniae 2023    Claudication of gluteal region ([address]) 2019    Dyslipidemia 2018    Mitral valve insufficiency 2018    Nonrheumatic aortic valve stenosis 2018    Stenosis of carotid artery 2017    Sepsis due to pneumonia ([address]) 10/24/2023    Asteatosis cutis 2023    Cholelithiasis with acute cholecystitis without obstruction 2023    Clubbed toes     Diastolic heart failure ([address]) 2023    Edema 2023    Glucose intolerance (impaired glucose tolerance) 2023    Hearing loss 2023    Hyperlipemia 2023    Scoliosis, thoracogenic 2023    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

## 2023-10-25 NOTE — PROGRESS NOTES
Disposition:  Assessment: Pt is a 79 yo male presenting with the above stated deficits directly impacting ability to safely and INDly complete daily living tasks. Pt significantly limited by tremors on this date as well as decreased motivation to participate at this time.   Performance deficits / Impairments: Decreased functional mobility , Decreased ADL status, Decreased strength, Decreased endurance, Decreased cognition, Decreased safe awareness, Decreased balance, Decreased high-level IADLs, Decreased fine motor control, Decreased posture, Decreased coordination  Prognosis: Fair  Discharge Recommendations: Continue to assess pending progress  Decision Making: Medium Complexity  History: med  Exam: 11 performance deficits  Assistance / Modification: total A    AMPAC (Six Click) Self care Score   How much help is needed for putting on and taking off regular lower body clothing?: Total  How much help is needed for bathing (which includes washing, rinsing, drying)?: Total  How much help is needed for toileting (which includes using toilet, bedpan, or urinal)?: Total  How much help is needed for putting on and taking off regular upper body clothing?: Total  How much help is needed for taking care of personal grooming?: A Lot  How much help for eating meals?: Total  AM-PAC Inpatient Daily Activity Raw Score: 7  AM-PAC Inpatient ADL T-Scale Score : 20.13  ADL Inpatient CMS 0-100% Score: 92.44    Therapy key for assistance levels -   Independent/Mod I = Pt. is able to perform task with no assistance but may require a device   Stand by assistance = Pt. does not perform task at an independent level but does not need physical assistance, requires verbal cues  Minimal, Moderate, Maximal Assistance = Pt. requires physical assistance (25%, 50%, 75% assist from helper) for task but is able to actively participate in task   Dependent = Pt. requires total assistance with task and is not able to actively participate with task

## 2023-10-25 NOTE — ACP (ADVANCE CARE PLANNING)
I discussed the patient's case in detail on 10/24 and 10/25 with the patient's daughter. She reports falls over the last 3 weeks. Currently, the patient appears much improved and near his baseline. He is having intermittent hallucinations. We reviewed different CODE STATUS options and she feels for now he will best be served as DNR CCA. She would like consultation with palliative care to continue to follow him after discharge to skilled nursing facility in case he continues to further decompensate, she would [facility]. For now, we will continue medical treatment and rehabilitation in hopes that he will improve and maybe be able to return to assisted living. 20min ACP planning discussing above.      Beatriz Vargas, DO

## 2023-10-25 NOTE — CARE COORDINATION
Case Management Assessment  Initial Evaluation    Date/Time of Evaluation: 10/25/2023 10:50 AM  Assessment Completed by: Taylor Sweet RN    If patient is discharged prior to next notation, then this note serves as note for discharge by case management. Patient Name: John Harris                   YOB: 1932  Diagnosis: Hypoxia [R09.02]  General weakness [R53.1]  Febrile illness [R50.9]  Sepsis due to pneumonia ([address]) [J18.9, A41.9]  Pneumonia due to infectious organism, unspecified laterality, unspecified part of lung [J18.9]                   Date / Time: 10/24/2023  9:05 AM    Patient Admission Status: Inpatient   Readmission Risk (Low < 19, Mod (19-27), High > 27): Readmission Risk Score: 19.9    Current PCP: Gavi Pham MD  PCP verified by CM? Yes    Chart Reviewed: Yes      History Provided by: Patient  Patient Orientation: Alert and Oriented, Person, Place    Patient Cognition: Short Term Memory Deficit    Hospitalization in the last 30 days (Readmission):  No    If yes, Readmission Assessment in  Navigator will be completed. Advance Directives:      Code Status: Full Code   Patient's Primary Decision Maker is:      Primary Decision MakerAdmaria Ram - [phone]    Secondary Decision Maker: [place] - [phone]    Discharge Planning:    Patient lives with: Alone (senior living) Type of Home: Assisted living  Primary Care Giver:  Other (Comment) (FROM [facility])  Patient Support Systems include: Children   Current Financial resources: Medicare  Current community resources: Assisted Living  Current services prior to admission: Other (Comment) ([facility])            Current DME:              Type of Home Care services:       ADLS  Prior functional level: Assistance with the following:, Bathing, Dressing, Toileting  Current functional level: Assistance with the following:, Bathing, Dressing, Toileting    PT AM-PAC:   /24  OT

## 2023-10-25 NOTE — CARE COORDINATION
Patient with demenita. Multiple falls from assisted living. Snf list left at bedside for daughter. spoke with dtr about dc plan and she is not sure at this time.  Call placed to [facility] for possible bedside eval prior to return

## 2023-10-26 PROBLEM — Z71.89 GOALS OF CARE, COUNSELING/DISCUSSION: Status: ACTIVE | Noted: 2023-10-26

## 2023-10-26 PROBLEM — Z51.5 PALLIATIVE CARE ENCOUNTER: Status: ACTIVE | Noted: 2023-10-26

## 2023-10-26 PROBLEM — Z71.89 ADVANCED CARE PLANNING/COUNSELING DISCUSSION: Status: ACTIVE | Noted: 2023-10-26

## 2023-10-26 LAB
ANION GAP SERPL CALCULATED.3IONS-SCNC: 8 MEQ/L (ref 9–15)
BASOPHILS # BLD: 0.1 K/UL (ref 0–0.2)
BASOPHILS NFR BLD: 0.8 %
BUN SERPL-MCNC: 20 MG/DL (ref 8–23)
CALCIUM SERPL-MCNC: 7.7 MG/DL (ref 8.5–9.9)
CHLORIDE SERPL-SCNC: 107 MEQ/L (ref 95–107)
CO2 SERPL-SCNC: 27 MEQ/L (ref 20–31)
CREAT SERPL-MCNC: 0.76 MG/DL (ref 0.7–1.2)
EKG ATRIAL RATE: 108 BPM
EKG P AXIS: 33 DEGREES
EKG P-R INTERVAL: 176 MS
EKG Q-T INTERVAL: 342 MS
EKG QRS DURATION: 84 MS
EKG QTC CALCULATION (BAZETT): 458 MS
EKG R AXIS: 10 DEGREES
EKG T AXIS: 26 DEGREES
EKG VENTRICULAR RATE: 108 BPM
EOSINOPHIL # BLD: 1 K/UL (ref 0–0.7)
EOSINOPHIL NFR BLD: 10.8 %
ERYTHROCYTE [DISTWIDTH] IN BLOOD BY AUTOMATED COUNT: 13.2 % (ref 11.5–14.5)
GLUCOSE BLD-MCNC: 160 MG/DL (ref 70–99)
GLUCOSE BLD-MCNC: 170 MG/DL (ref 70–99)
GLUCOSE BLD-MCNC: 209 MG/DL (ref 70–99)
GLUCOSE BLD-MCNC: 226 MG/DL (ref 70–99)
GLUCOSE SERPL-MCNC: 175 MG/DL (ref 70–99)
HCT VFR BLD AUTO: 33.5 % (ref 42–52)
HGB BLD-MCNC: 11 G/DL (ref 14–18)
LYMPHOCYTES # BLD: 1.4 K/UL (ref 1–4.8)
LYMPHOCYTES NFR BLD: 14 %
MCH RBC QN AUTO: 31 PG (ref 27–31.3)
MCHC RBC AUTO-ENTMCNC: 32.8 % (ref 33–37)
MCV RBC AUTO: 94.4 FL (ref 79–92.2)
MONOCYTES # BLD: 0.7 K/UL (ref 0.2–0.8)
MONOCYTES NFR BLD: 7.4 %
NEUTROPHILS # BLD: 6.4 K/UL (ref 1.4–6.5)
NEUTS SEG NFR BLD: 66.6 %
PERFORMED ON: ABNORMAL
PHOSPHATE SERPL-MCNC: 2.9 MG/DL (ref 2.3–4.8)
PLATELET # BLD AUTO: 177 K/UL (ref 130–400)
POTASSIUM SERPL-SCNC: 3.8 MEQ/L (ref 3.4–4.9)
RBC # BLD AUTO: 3.55 M/UL (ref 4.7–6.1)
SODIUM SERPL-SCNC: 142 MEQ/L (ref 135–144)
WBC # BLD AUTO: 9.6 K/UL (ref 4.8–10.8)

## 2023-10-26 PROCEDURE — 99222 1ST HOSP IP/OBS MODERATE 55: CPT | Performed by: NURSE PRACTITIONER

## 2023-10-26 PROCEDURE — 80048 BASIC METABOLIC PNL TOTAL CA: CPT

## 2023-10-26 PROCEDURE — 94640 AIRWAY INHALATION TREATMENT: CPT

## 2023-10-26 PROCEDURE — 6360000002 HC RX W HCPCS: Performed by: INTERNAL MEDICINE

## 2023-10-26 PROCEDURE — 1210000000 HC MED SURG R&B

## 2023-10-26 PROCEDURE — 2580000003 HC RX 258

## 2023-10-26 PROCEDURE — 36415 COLL VENOUS BLD VENIPUNCTURE: CPT

## 2023-10-26 PROCEDURE — 85025 COMPLETE CBC W/AUTO DIFF WBC: CPT

## 2023-10-26 PROCEDURE — 2700000000 HC OXYGEN THERAPY PER DAY

## 2023-10-26 PROCEDURE — 2580000003 HC RX 258: Performed by: INTERNAL MEDICINE

## 2023-10-26 PROCEDURE — 6370000000 HC RX 637 (ALT 250 FOR IP): Performed by: INTERNAL MEDICINE

## 2023-10-26 PROCEDURE — 84100 ASSAY OF PHOSPHORUS: CPT

## 2023-10-26 PROCEDURE — 94761 N-INVAS EAR/PLS OXIMETRY MLT: CPT

## 2023-10-26 RX ORDER — AZITHROMYCIN 500 MG/1
500 TABLET, FILM COATED ORAL DAILY
Status: DISCONTINUED | OUTPATIENT
Start: 2023-10-27 | End: 2023-10-28 | Stop reason: HOSPADM

## 2023-10-26 RX ORDER — AMOXICILLIN AND CLAVULANATE POTASSIUM 875; 125 MG/1; MG/1
1 TABLET, FILM COATED ORAL EVERY 12 HOURS SCHEDULED
Status: DISCONTINUED | OUTPATIENT
Start: 2023-10-27 | End: 2023-10-28 | Stop reason: HOSPADM

## 2023-10-26 RX ORDER — SODIUM CHLORIDE 9 MG/ML
INJECTION, SOLUTION INTRAVENOUS
Status: COMPLETED
Start: 2023-10-26 | End: 2023-10-26

## 2023-10-26 RX ADMIN — SODIUM CHLORIDE, PRESERVATIVE FREE 10 ML: 5 INJECTION INTRAVENOUS at 11:43

## 2023-10-26 RX ADMIN — Medication 5 MG: at 22:40

## 2023-10-26 RX ADMIN — ENOXAPARIN SODIUM 40 MG: 100 INJECTION SUBCUTANEOUS at 17:32

## 2023-10-26 RX ADMIN — INSULIN LISPRO 2 UNITS: 100 INJECTION, SOLUTION INTRAVENOUS; SUBCUTANEOUS at 17:32

## 2023-10-26 RX ADMIN — IPRATROPIUM BROMIDE AND ALBUTEROL SULFATE 1 DOSE: 2.5; .5 SOLUTION RESPIRATORY (INHALATION) at 19:17

## 2023-10-26 RX ADMIN — PRAVASTATIN SODIUM 80 MG: 40 TABLET ORAL at 17:32

## 2023-10-26 RX ADMIN — METOPROLOL SUCCINATE 25 MG: 25 TABLET, EXTENDED RELEASE ORAL at 11:39

## 2023-10-26 RX ADMIN — PIPERACILLIN AND TAZOBACTAM 3375 MG: 3; .375 INJECTION, POWDER, LYOPHILIZED, FOR SOLUTION INTRAVENOUS at 04:49

## 2023-10-26 RX ADMIN — VANCOMYCIN HYDROCHLORIDE 750 MG: 750 INJECTION, POWDER, LYOPHILIZED, FOR SOLUTION INTRAVENOUS at 11:38

## 2023-10-26 RX ADMIN — IPRATROPIUM BROMIDE AND ALBUTEROL SULFATE 1 DOSE: 2.5; .5 SOLUTION RESPIRATORY (INHALATION) at 07:21

## 2023-10-26 RX ADMIN — PIPERACILLIN AND TAZOBACTAM 3375 MG: 3; .375 INJECTION, POWDER, LYOPHILIZED, FOR SOLUTION INTRAVENOUS at 22:39

## 2023-10-26 RX ADMIN — PIPERACILLIN AND TAZOBACTAM 3375 MG: 3; .375 INJECTION, POWDER, LYOPHILIZED, FOR SOLUTION INTRAVENOUS at 12:50

## 2023-10-26 RX ADMIN — SODIUM CHLORIDE 250 ML: 9 INJECTION, SOLUTION INTRAVENOUS at 04:45

## 2023-10-26 RX ADMIN — GUAIFENESIN 600 MG: 600 TABLET, EXTENDED RELEASE ORAL at 22:40

## 2023-10-26 RX ADMIN — VANCOMYCIN HYDROCHLORIDE 750 MG: 750 INJECTION, POWDER, LYOPHILIZED, FOR SOLUTION INTRAVENOUS at 00:33

## 2023-10-26 RX ADMIN — ASPIRIN 81 MG CHEWABLE TABLET 81 MG: 81 TABLET CHEWABLE at 11:39

## 2023-10-26 RX ADMIN — IPRATROPIUM BROMIDE AND ALBUTEROL SULFATE 1 DOSE: 2.5; .5 SOLUTION RESPIRATORY (INHALATION) at 12:45

## 2023-10-26 RX ADMIN — GUAIFENESIN 600 MG: 600 TABLET, EXTENDED RELEASE ORAL at 11:39

## 2023-10-26 ASSESSMENT — ENCOUNTER SYMPTOMS
TROUBLE SWALLOWING: 1
COUGH: 1
SHORTNESS OF BREATH: 0

## 2023-10-26 NOTE — PROGRESS NOTES
Physical [facility]  [facility] Y[facility]W337-79    NAME: Manjeet Wood    : 1932 (80 y.o.)  MRN: [id number]    Account: [de-identified]  Gender: male      PT referral received. Chart reviewed. PT eval attempted at 0831. RN hold due to pt just fell asleep. Will follow and attempt PT evaluation again at earliest availability.        Thad Mota, PT, 10/26/23 at 9:06 AM

## 2023-10-26 NOTE — DISCHARGE INSTR - COC
Continuity of Care Form    Patient Name: Dee Dee Vigil   :  1932  MRN:  [id number]    Admit date:  10/24/2023  Discharge date:  10/27/23    Code Status Order: DNR-CCA   Advance Directives:     Admitting Physician:  Ana Davis DO  PCP: Joellen Rajan MD    Discharging Nurse: [facility] Unit/Room#: R118/T933-38  Discharging Unit Phone Number: [phone]    Emergency Contact:   Extended Emergency Contact Information  Primary Emergency Contact: [facility] Phone: [phone]  Relation: Child  Preferred language: English   needed? No  Secondary Emergency Contact: [address] [place] [address] Phone: [phone]  Relation: Spouse   needed? No    Past Surgical History:  History reviewed. No pertinent surgical history.     Immunization History:   Immunization History   Administered Date(s) Administered    COVID-19, PFIZER Bivalent, DO NOT Dilute, (age 12y+), IM, 27 mcg/0.3 mL 10/05/2022    COVID-19, PFIZER GRAY top, DO NOT Dilute, (age 15 y+), IM, 30 mcg/0.3 mL 2022    COVID-19, PFIZER PURPLE top, DILUTE for use, (age 15 y+), 30mcg/0.3mL 2021, 2021       Active Problems:  Patient Active Problem List   Diagnosis Code    Community acquired pneumonia due to Mycoplasma pneumoniae J15.7    Claudication of gluteal region [facility]) I73.9    Dyslipidemia E78.5    Mitral valve insufficiency I34.0    Nonrheumatic aortic valve stenosis I35.0    Stenosis of carotid artery I65.29    Sepsis ([address]) A41.9    Dementia ([address]) F03.90    Acute alteration in mental status R41.82    Bacteremia R78.81    Asteatosis cutis L85.3    Cholelithiasis with acute cholecystitis without obstruction K80.00    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

## 2023-10-26 NOTE — CARE COORDINATION
This LSW visited patient at bedside this am.  I called and spoke with patients daughter, Marli Cedeno. Discharge plans discussed. Patient currently lives with his wife at  [facility]. [facility] is to complete bedside assessment on 10/27/23. Daughter is hoping that [facility] can accept patient back, even if level of care needs to be increased. If [facility] is not able to accept patient back, daughter requesting to have referral sent to [facility]. I sent referral to [facility] at [facility]. Awaiting response at this time. LSW/CM to follow. Electronically signed by SHANNAN Harrison LSW on 10/26/23 at 10:14 AM EDT   This LSW was notified by [facility] that patients referral has been accepted. This LSW will notify [facility] after [facility] bedside assessment on 10/27/23. LSW/CM to follow.   Electronically signed by SHANNAN Harrison LSW on 10/26/23 at 11:30 AM EDT

## 2023-10-26 NOTE — CONSULTS
RBAIA, DO   25 mg at 10/26/23 1139    pravastatin (PRAVACHOL) tablet 80 mg  80 mg Oral QPM Duy Mckeon DO   80 mg at 10/25/23 1802    sodium chloride flush 0.9 % injection 5-40 mL  5-40 mL IntraVENous 2 times per day Jessica CAMARILLO DO   10 mL at 10/26/23 1143    sodium chloride flush 0.9 % injection 5-40 mL  5-40 mL IntraVENous PRN Jessica CAMARILLO, DO        0.9 % sodium chloride infusion   IntraVENous PRN Jessica CAMARILLO DO        enoxaparin (LOVENOX) injection 40 mg  40 mg SubCUTAneous Daily Jessica CAMARILLO DO   40 mg at 10/25/23 1802    acetaminophen (TYLENOL) tablet 650 mg  650 mg Oral Q6H PRN Jessica CAMARILLO DO        Or    acetaminophen (TYLENOL) suppository 650 mg  650 mg Rectal Q6H PRN Duy Mckeon DO        guaiFENesin [facility]) extended release tablet 600 mg  600 mg Oral BID Jessica CAMARILLO DO   600 mg at 10/26/23 1139    guaiFENesin-dextromethorphan (ROBITUSSIN DM) 100-10 MG/5ML syrup 5 mL  5 mL Oral Q4H PRN Jessica CAMARILLO DO        glucose chewable tablet 16 g  4 tablet Oral PRN Jessica CAMARILLO DO        dextrose bolus 10% 125 mL  125 mL IntraVENous PRN Jessica CAMARILLO DO        Or    dextrose bolus 10% 250 mL  250 mL IntraVENous PRN Jessica CAMARILLO DO        glucagon injection 1 mg  1 mg SubCUTAneous PRN Jessica CAMARILLO DO        dextrose 10 % infusion   IntraVENous Continuous PRN Jessica CAMARILLO DO        insulin lispro (HUMALOG) injection vial 0-8 Units  0-8 Units SubCUTAneous TID  Jessica CAMARILLO DO   2 Units at 10/25/23 1802    insulin lispro (HUMALOG) injection vial 0-4 Units  0-4 Units SubCUTAneous Nightly Williams Shay DO        piperacillin-tazobactam (ZOSYN) 3,375 mg in sodium chloride 0.9 % 50 mL IVPB (mini-bag)  3,375 mg IntraVENous Q8H Jessica CAMARILLO DO   Stopped at 10/26/23 0850    vancomycin (VANCOCIN) intermittent dosing (placeholder)   Other RX Placeholder Jessica CAMARILLO DO        vancomycin (VANCOCIN) 750 mg in sodium chloride 0.9 % 250 mL symptom management related to chronic disease/condition. Provided emotional support and active listening. They would like to proceed with palliative care for patient in the outpatient setting. Will sign off and continue to monitor as an outpatient for further decline. -Advance Care Planning  Discussed goals of care with patient. Explained in extensive detail nuances between full code, DNR CCA and DNR CC. Patient has made the decision to be [facility]. HPOA in place: Daughter, Pako Schofield, is patient's first agent.       -Goals of Care Discussion:  Disease process and goals of treatment were discussed in basic terms. Navarro's goal is to optimize available comfort care measures, maximize function, and continue with aggressive treatment/medications. We discussed the palliative care philosophy in light of those goals. We discussed all care options contingent on treatment response and QOL. Much active listening, presence, and emotional support were given.      -Patient is currently being treated for multiple medical conditions by other providers    Suspected sepsis, source unclear suspect mild underlying pneumonia  Acute respiratory failure with hypoxia  Possible acute on chronic diastolic heart failure related to severe aortic stenosis  BLE edema  Tremor  Dementia with behavioral disturbances    MDM: Moderate    Electronically signed by AZEB Scales CNP on 10/26/2023 at 12:48 PM

## 2023-10-26 NOTE — PROGRESS NOTES
Patient continues to remove his oxygen and his oxygen saturation drops to 87-88 percent. Video 1 on 1 discontinued and one on one applied for complaint with oxygen. Very agitated and yelling at staff.

## 2023-10-26 NOTE — PROGRESS NOTES
[facility]   Facility/Department: [facility]  Speech Language Pathology    Ralph Castro  11/30/1932  [id number]    Date: 10/26/2023      Speech Therapy attempted to see Ralph Castro on this date for a/an:    Treatment    Pt was unable to be seen due to:   Nursing deferred: Pt with increased agitation overnight. Per nursing report, pt just fell asleep and requested to attempt tx at another time.          Electronically signed by JUNIOR Stephens on 10/26/23 at 9:46 AM EDT

## 2023-10-26 NOTE — FLOWSHEET NOTE
Pt in bed with eyes closed. Respirations even and nonlabored on [facility]. He accepted RT earlier this morning. VSS. Tele SR.     1200 Pt awake in bed. Alert to self only. Reoriented to place, year, situation. Pt follows commands. He is able to move all extremities. Generalized weakness noted. Respirations even and nonlabored on [facility]. 1330 Pt ate fair for lunch. Tolerated prescribed diet without problem. Pt talked with his wife on the phone. He was repositioned in bed for comfort. Primary service and palliative care service providers in to see pt. He is currently resting quietly in bed with eyes closed. Respirations even and nonlabored on [facility]. 1540 Pt has been sleeping on and off this afternoon. Periodically removes O2. Can be easily irritated at times. His daughter and wife are in to visit now. 1805 Pt ate fair for dinner. Accepted PO meds without problem. Depends changed, pt repositioned for comfort. He is currently awake in bed watching TV. Respirations even and nonlabored on [facility].

## 2023-10-27 LAB
ANION GAP SERPL CALCULATED.3IONS-SCNC: 8 MEQ/L (ref 9–15)
BACTERIA URNS QL MICRO: NEGATIVE /HPF
BASOPHILS # BLD: 0.1 K/UL (ref 0–0.2)
BASOPHILS NFR BLD: 0.7 %
BILIRUB UR QL STRIP: NEGATIVE
BUN SERPL-MCNC: 21 MG/DL (ref 8–23)
CALCIUM SERPL-MCNC: 8.5 MG/DL (ref 8.5–9.9)
CHLORIDE SERPL-SCNC: 108 MEQ/L (ref 95–107)
CLARITY UR: CLEAR
CO2 SERPL-SCNC: 26 MEQ/L (ref 20–31)
COLOR UR: ABNORMAL
CREAT SERPL-MCNC: 0.87 MG/DL (ref 0.7–1.2)
CRYSTALS URNS MICRO: ABNORMAL /HPF
EOSINOPHIL # BLD: 1.1 K/UL (ref 0–0.7)
EOSINOPHIL NFR BLD: 11.2 %
EPI CELLS #/AREA URNS AUTO: ABNORMAL /HPF (ref 0–5)
ERYTHROCYTE [DISTWIDTH] IN BLOOD BY AUTOMATED COUNT: 13.2 % (ref 11.5–14.5)
GLUCOSE BLD-MCNC: 141 MG/DL (ref 70–99)
GLUCOSE BLD-MCNC: 148 MG/DL (ref 70–99)
GLUCOSE BLD-MCNC: 153 MG/DL (ref 70–99)
GLUCOSE BLD-MCNC: 214 MG/DL (ref 70–99)
GLUCOSE SERPL-MCNC: 161 MG/DL (ref 70–99)
GLUCOSE UR STRIP-MCNC: NEGATIVE MG/DL
HCT VFR BLD AUTO: 34.7 % (ref 42–52)
HGB BLD-MCNC: 11.4 G/DL (ref 14–18)
HGB UR QL STRIP: NEGATIVE
HYALINE CASTS #/AREA URNS AUTO: ABNORMAL /HPF (ref 0–5)
KETONES UR STRIP-MCNC: ABNORMAL MG/DL
LEUKOCYTE ESTERASE UR QL STRIP: NEGATIVE
LYMPHOCYTES # BLD: 1.5 K/UL (ref 1–4.8)
LYMPHOCYTES NFR BLD: 15.4 %
MCH RBC QN AUTO: 31.1 PG (ref 27–31.3)
MCHC RBC AUTO-ENTMCNC: 32.9 % (ref 33–37)
MCV RBC AUTO: 94.6 FL (ref 79–92.2)
MONOCYTES # BLD: 0.9 K/UL (ref 0.2–0.8)
MONOCYTES NFR BLD: 8.8 %
NEUTROPHILS # BLD: 6.3 K/UL (ref 1.4–6.5)
NEUTS SEG NFR BLD: 63.5 %
NITRITE UR QL STRIP: NEGATIVE
PERFORMED ON: ABNORMAL
PH UR STRIP: 7 [PH] (ref 5–9)
PHOSPHATE SERPL-MCNC: 2.9 MG/DL (ref 2.3–4.8)
PLATELET # BLD AUTO: 203 K/UL (ref 130–400)
POTASSIUM SERPL-SCNC: 3.8 MEQ/L (ref 3.4–4.9)
PROT UR STRIP-MCNC: 30 MG/DL
RBC # BLD AUTO: 3.67 M/UL (ref 4.7–6.1)
RBC #/AREA URNS AUTO: ABNORMAL /HPF (ref 0–5)
SODIUM SERPL-SCNC: 142 MEQ/L (ref 135–144)
SP GR UR STRIP: 1.03 (ref 1–1.03)
UROBILINOGEN UR STRIP-ACNC: 1 E.U./DL
WBC # BLD AUTO: 10 K/UL (ref 4.8–10.8)
WBC #/AREA URNS AUTO: ABNORMAL /HPF (ref 0–5)

## 2023-10-27 PROCEDURE — 2580000003 HC RX 258: Performed by: INTERNAL MEDICINE

## 2023-10-27 PROCEDURE — 85025 COMPLETE CBC W/AUTO DIFF WBC: CPT

## 2023-10-27 PROCEDURE — 94640 AIRWAY INHALATION TREATMENT: CPT

## 2023-10-27 PROCEDURE — 6370000000 HC RX 637 (ALT 250 FOR IP): Performed by: INTERNAL MEDICINE

## 2023-10-27 PROCEDURE — 81001 URINALYSIS AUTO W/SCOPE: CPT

## 2023-10-27 PROCEDURE — 2700000000 HC OXYGEN THERAPY PER DAY

## 2023-10-27 PROCEDURE — 80048 BASIC METABOLIC PNL TOTAL CA: CPT

## 2023-10-27 PROCEDURE — 97163 PT EVAL HIGH COMPLEX 45 MIN: CPT

## 2023-10-27 PROCEDURE — 92526 ORAL FUNCTION THERAPY: CPT

## 2023-10-27 PROCEDURE — 36415 COLL VENOUS BLD VENIPUNCTURE: CPT

## 2023-10-27 PROCEDURE — 1210000000 HC MED SURG R&B

## 2023-10-27 PROCEDURE — 84100 ASSAY OF PHOSPHORUS: CPT

## 2023-10-27 PROCEDURE — 6360000002 HC RX W HCPCS: Performed by: INTERNAL MEDICINE

## 2023-10-27 PROCEDURE — 94761 N-INVAS EAR/PLS OXIMETRY MLT: CPT

## 2023-10-27 RX ORDER — AMOXICILLIN AND CLAVULANATE POTASSIUM 875; 125 MG/1; MG/1
1 TABLET, FILM COATED ORAL EVERY 12 HOURS SCHEDULED
Qty: 4 TABLET | Refills: 0 | Status: SHIPPED | OUTPATIENT
Start: 2023-10-27 | End: 2023-10-29

## 2023-10-27 RX ORDER — AZITHROMYCIN 500 MG/1
500 TABLET, FILM COATED ORAL DAILY
Qty: 2 TABLET | Refills: 0 | Status: SHIPPED | OUTPATIENT
Start: 2023-10-28 | End: 2023-10-30

## 2023-10-27 RX ORDER — AZITHROMYCIN 500 MG/1
500 TABLET, FILM COATED ORAL DAILY
Qty: 2 TABLET | Refills: 0 | Status: SHIPPED | OUTPATIENT
Start: 2023-10-28 | End: 2023-10-27 | Stop reason: SDUPTHER

## 2023-10-27 RX ORDER — DIPHENHYDRAMINE HCL 25 MG
12.5 TABLET ORAL EVERY 6 HOURS PRN
Status: DISCONTINUED | OUTPATIENT
Start: 2023-10-27 | End: 2023-10-28 | Stop reason: HOSPADM

## 2023-10-27 RX ORDER — AMOXICILLIN AND CLAVULANATE POTASSIUM 875; 125 MG/1; MG/1
1 TABLET, FILM COATED ORAL EVERY 12 HOURS SCHEDULED
Qty: 4 TABLET | Refills: 0 | Status: SHIPPED | OUTPATIENT
Start: 2023-10-27 | End: 2023-10-27 | Stop reason: SDUPTHER

## 2023-10-27 RX ADMIN — ASPIRIN 81 MG CHEWABLE TABLET 81 MG: 81 TABLET CHEWABLE at 09:25

## 2023-10-27 RX ADMIN — METOPROLOL SUCCINATE 25 MG: 25 TABLET, EXTENDED RELEASE ORAL at 09:26

## 2023-10-27 RX ADMIN — ACETAMINOPHEN 650 MG: 325 TABLET ORAL at 23:52

## 2023-10-27 RX ADMIN — IPRATROPIUM BROMIDE AND ALBUTEROL SULFATE 1 DOSE: 2.5; .5 SOLUTION RESPIRATORY (INHALATION) at 19:06

## 2023-10-27 RX ADMIN — SODIUM CHLORIDE, PRESERVATIVE FREE 10 ML: 5 INJECTION INTRAVENOUS at 23:54

## 2023-10-27 RX ADMIN — GUAIFENESIN 600 MG: 600 TABLET, EXTENDED RELEASE ORAL at 23:52

## 2023-10-27 RX ADMIN — GUAIFENESIN 600 MG: 600 TABLET, EXTENDED RELEASE ORAL at 09:26

## 2023-10-27 RX ADMIN — AMOXICILLIN AND CLAVULANATE POTASSIUM 1 TABLET: 875; 125 TABLET, FILM COATED ORAL at 09:26

## 2023-10-27 RX ADMIN — ENOXAPARIN SODIUM 40 MG: 100 INJECTION SUBCUTANEOUS at 09:35

## 2023-10-27 RX ADMIN — AMOXICILLIN AND CLAVULANATE POTASSIUM 1 TABLET: 875; 125 TABLET, FILM COATED ORAL at 23:53

## 2023-10-27 RX ADMIN — IPRATROPIUM BROMIDE AND ALBUTEROL SULFATE 1 DOSE: 2.5; .5 SOLUTION RESPIRATORY (INHALATION) at 12:50

## 2023-10-27 RX ADMIN — IPRATROPIUM BROMIDE AND ALBUTEROL SULFATE 1 DOSE: 2.5; .5 SOLUTION RESPIRATORY (INHALATION) at 06:50

## 2023-10-27 RX ADMIN — SODIUM CHLORIDE, PRESERVATIVE FREE 10 ML: 5 INJECTION INTRAVENOUS at 09:27

## 2023-10-27 RX ADMIN — AZITHROMYCIN DIHYDRATE 500 MG: 500 TABLET, FILM COATED ORAL at 09:26

## 2023-10-27 RX ADMIN — Medication 5 MG: at 23:52

## 2023-10-27 RX ADMIN — ACETAMINOPHEN 650 MG: 325 TABLET ORAL at 00:03

## 2023-10-27 RX ADMIN — PRAVASTATIN SODIUM 80 MG: 40 TABLET ORAL at 18:26

## 2023-10-27 ASSESSMENT — PAIN DESCRIPTION - ORIENTATION: ORIENTATION: RIGHT;LEFT

## 2023-10-27 ASSESSMENT — PAIN DESCRIPTION - LOCATION
LOCATION: LEG
LOCATION: LEG

## 2023-10-27 ASSESSMENT — PAIN SCALES - GENERAL
PAINLEVEL_OUTOF10: 3
PAINLEVEL_OUTOF10: 3

## 2023-10-27 NOTE — DISCHARGE SUMMARY
Result Value Ref Range    Sodium 142 135 - 144 mEq/L    Potassium reflex Magnesium 3.8 3.4 - 4.9 mEq/L    Chloride 108 (H) 95 - 107 mEq/L    CO2 26 20 - 31 mEq/L    Anion Gap 8 (L) 9 - 15 mEq/L    Glucose 161 (H) 70 - 99 mg/dL    BUN 21 8 - 23 mg/dL    Creatinine 0.87 0.70 - 1.20 mg/dL    Est, Glom Filt Rate >60.0 >60    Calcium 8.5 8.5 - 9.9 mg/dL   Phosphorus   Result Value Ref Range    Phosphorus 2.9 2.3 - 4.8 mg/dL   Urinalysis   Result Value Ref Range    Color, UA DARK YELLOW (A) Straw/Yellow    Clarity, UA Clear Clear    Glucose, Ur Negative Negative mg/dL    Bilirubin Urine Negative Negative    Ketones, Urine TRACE (A) Negative mg/dL    Specific Gravity, UA 1.030 1.005 - 1.030    Blood, Urine Negative Negative    pH, UA 7.0 5.0 - 9.0    Protein, UA 30 (A) Negative mg/dL    Urobilinogen, Urine 1.0 <2.0 E.U./dL    Nitrite, Urine Negative Negative    Leukocyte Esterase, Urine Negative Negative   Microscopic Urinalysis   Result Value Ref Range    Bacteria, UA Negative Negative /HPF    Crystals, UA 1+ Ca.  Oxalate (A) None Seen /HPF    Hyaline Casts, UA 0-1 0 - 5 /HPF    WBC, UA 0-2 0 - 5 /HPF    RBC, UA 0-2 0 - 5 /HPF    Epithelial Cells, UA 0-2 0 - 5 /HPF   POCT Arterial   Result Value Ref Range    POC Sodium 141 136 - 145 mEq/L    POC Potassium 3.5 3.5 - 5.1 mEq/L    POC Chloride 102 99 - 110 mEq/L    POC Glucose 190 (H) 70 - 99 mg/dl    POC Creatinine 0.8 0.8 - 1.3 mg/dL    Est, Glom Filt Rate >60 >60    Calcium, Ionized 1.13 1.12 - 1.32 mmol/L    pH, Arterial 7.432 7.350 - 7.450    pCO2, Arterial 46 (H) 35 - 45 mm Hg    pO2, Arterial 72 (HH) 75 - 108 mm Hg    HCO3, Arterial 30.7 (H) 21.0 - 29.0 mmol/L    Base Excess, Arterial 6 (H) -3 - 3    O2 Sat, Arterial 95 (HH) 93 - 100 %    TCO2, Arterial 32 21 - 32 mmol/L    Lactate 1.25 0.40 - 2.00 mmol/L    POC Hematocrit 37 (L) 41 - 53 %    Hemoglobin 12.6 (L) 13.5 - 17.5 gm/dL    FIO2 3.000     Sample Type ART     Performed on SEE BELOW    POCT Glucose   Result Value Ref Range    POC Glucose 178 (H) 70 - 99 mg/dl    Performed on ACCU-CHEK    POCT Glucose   Result Value Ref Range    POC Glucose 204 (H) 70 - 99 mg/dl    Performed on ACCU-CHEK    POCT Glucose   Result Value Ref Range    POC Glucose 206 (H) 70 - 99 mg/dl    Performed on ACCU-CHEK    POCT Glucose   Result Value Ref Range    POC Glucose 185 (H) 70 - 99 mg/dl    Performed on ACCU-CHEK    POCT Glucose   Result Value Ref Range    POC Glucose 170 (H) 70 - 99 mg/dl    Performed on ACCU-CHEK    POCT Glucose   Result Value Ref Range    POC Glucose 160 (H) 70 - 99 mg/dl    Performed on ACCU-CHEK    POCT Glucose   Result Value Ref Range    POC Glucose 209 (H) 70 - 99 mg/dl    Performed on ACCU-CHEK    POCT Glucose   Result Value Ref Range    POC Glucose 226 (H) 70 - 99 mg/dl    Performed on ACCU-CHEK    POCT Glucose   Result Value Ref Range    POC Glucose 148 (H) 70 - 99 mg/dl    Performed on ACCU-CHEK    POCT Glucose   Result Value Ref Range    POC Glucose 214 (H) 70 - 99 mg/dl    Performed on ACCU-CHEK    EKG 12 Lead   Result Value Ref Range    Ventricular Rate 108 BPM    Atrial Rate 108 BPM    P-R Interval 176 ms    QRS Duration 84 ms    Q-T Interval 342 ms    QTc Calculation (Bazett) 458 ms    P Axis 33 degrees    R Axis 10 degrees    T Axis 26 degrees   Echo (TTE) complete (PRN contrast/bubble/strain/3D)   Result Value Ref Range    IVSd 1.3 (A) 0.6 - 1.0 cm    IVSs 1.4 cm    LVIDd 4.3 4.2 - 5.9 cm    LVIDs 3.2 cm    LVOT Diameter 2.0 cm    LVPWd 1.1 (A) 0.6 - 1.0 cm    LVPWs 1.3 cm    EF BP 51 (A) 55 - 100 %    LV Ejection Fraction A2C 42 %    LV Ejection Fraction A4C 53 %    LV EDV A2C 41 mL    LV EDV A4C 82 mL    LV EDV BP 65 (A) 67 - 155 mL    LV ESV A2C 24 mL    LV ESV A4C 38 mL    LV ESV BP 32 22 - 58 mL    LVOT Peak Gradient 3 mmHg    LVOT Mean Gradient 2 mmHg    LVOT SV 74.7 ml    LVOT Peak Velocity 1.1 m/s    LVOT VTI 23.8 cm    RVIDd 2.8 cm    LA Volume 4C 80 (A) 18 - 58 mL    LA Volume 4C 74 (A) 18 - 58 mL

## 2023-10-27 NOTE — CARE COORDINATION
This LSW met with patient and Micah Acevedo from [facility]. Patient assessed for appropriateness to return to [facility]. Patient found appropriate to return to [facility]. Facility and daughter requesting discharge today, 10/27/2023. Dr. Vu Haas notified of request. Nader Matias, RN also notified. Facility also requesting [address] orders for patient to have PT,OT upon discharge. 2nd IMM completed by this LSW, copy left in patients room. Electronically signed by SHANNAN Elder, LSW on 10/27/23 at 11:17 AM EDT      This LSW arranged transportation via [facility] for 4:00pm. Daughter- Letitia Raymundo at [facility], Nader Matias RN are all aware. I faxed AVS to Micah Acevedo ( [phone]).   Electronically signed by SHANNAN Elder, LSW on 10/27/23 at 11:32 AM EDT

## 2023-10-27 NOTE — PROGRESS NOTES
Physical Therapy Med Surg Initial Assessment  Facility/Department: [facility] MED SURG UNIT  [facility]       NAME: Susan Hanley  : 1932 (80 y.o.)  MRN: [id number]  CODE STATUS: DNR-CCA    Date of Service: 10/27/2023    Patient Diagnosis(es): Hypoxia [R09.02]  General weakness [R53.1]  Febrile illness [R50.9]  Sepsis due to pneumonia ([address]) [J18.9, A41.9]  Pneumonia due to infectious organism, unspecified laterality, unspecified part of lung [J18.9]   Chief Complaint   Patient presents with    Fever     Staff at [facility] found him more confused, warm to touch, weakness, tremors     Patient Active Problem List    Diagnosis Date Noted    Community acquired pneumonia due to Mycoplasma pneumoniae 2023    Claudication of gluteal region [facility]) 2019    Dyslipidemia 2018    Mitral valve insufficiency 2018    Nonrheumatic aortic valve stenosis 2018    Stenosis of carotid artery 2017    Palliative care encounter 10/26/2023    Goals of care, counseling/discussion 10/26/2023    Advanced care planning/counseling discussion 10/26/2023    Pneumonia due to infectious organism 10/24/2023    Asteatosis cutis 2023    Cholelithiasis with acute cholecystitis without obstruction 2023    Clubbed toes     Diastolic heart failure ([address]) 2023    Edema 2023    Glucose intolerance (impaired glucose tolerance) 2023    Hearing loss 2023    Hyperlipemia 2023    Scoliosis, thoracogenic 2023    Spondylosis of thoracic region without myelopathy or radiculopathy 2023    Vascular insufficiency 2023    Vitamin D deficiency 2023    Dementia ([address]) 2023    Acute alteration in mental status 2023    Bacteremia 2023    Sepsis ([address]) 2023        Past Medical History:   Diagnosis Date    Bilateral lower extremity edema     Carotid stenosis     Dementia (HCC)     Diastolic CHF, acute on chronic Neuromuscular re-education, Pain management, Home exercise program, Safety education & training, Patient/Caregiver education & training, Positioning    Safety Devices  Type of Devices: All fall risk precautions in place, Left in bed, Sitter present  Restraints  Restraints Initially in Place: No    Goals:  Long Term Goals  Long Term Goal 1: patient will complete rolling with modA  Long Term Goal 2: patient will complete supine <> sit with modA  Long Term Goal 3: patient will stand EOB x2 min    AMPAC (6 CLICK) BASIC MOBILITY  AM-PAC Inpatient Mobility Raw Score : 8     Therapy Time:   Individual   Time In 0823   Time Out 0831   Minutes 8   Timed Code Treatment Minutes: 0 Minutes  8 min evaluation    Carri Deluna PT, 10/27/23 at 8:46 AM    Definitions for assistance levels  Independent = pt does not require any physical supervision or assistance from another person for activity completion. Device may be needed.   Stand by assistance = pt requires verbal cues or instructions from another person, close to but not touching, to perform the activity  Minimal assistance= pt performs 75% or more of the activity; assistance is required to complete the activity  Moderate assistance= pt performs 50% of the activity; assistance is required to complete the activity  Maximal assistance = pt performs 25% of the activity; assistance is required to complete the activity  Dependent = pt requires total physical assistance to accomplish the task

## 2023-10-27 NOTE — PROGRESS NOTES
[facility]   Facility/Department: [facility]  Speech Language Pathology    [facility]  11/30/1932  [id number]    Date: 10/27/2023      Speech Therapy attempted to see [facility] on this date for a/an:    Modified Barium Swallow    Pt was unable to be seen due to: Other: Call from RN, pt not tolerating transport and having increased agitation. MBS deferred.         Electronically signed by JUNIOR Young on 10/27/23 at 12:22 PM EDT

## 2023-10-27 NOTE — PROGRESS NOTES
[facility]  Facility/Department: [facility] SURG UNIT  Speech Language Pathology   Treatment Note      Liam Sheridan  1932  [id number]  [x]   confirmed      Date: 10/27/2023    Hypoxia [R09.02]  General weakness [R53.1]  Febrile illness [R50.9]  Sepsis due to pneumonia ([address]) [J18.9, A41.9]  Pneumonia due to infectious organism, unspecified laterality, unspecified part of lung [J18.9]    Restrictions/Precautions: Fall Risk, Modified Diet, Swallowing - Thickened Liquids    ADULT DIET; Dysphagia - Minced and Moist; No Added Salt (3-4 gm); Mildly Thick (Nectar)  ADULT ORAL NUTRITION SUPPLEMENT; Lunch, Dinner, Breakfast; Frozen Oral Supplement    O2 Flow Rate (L/min): 2 L/min (10/27/23 0651)   No active isolations      Subjective:  Alert, Cooperative, and Confused        Interventions used this date:  Dysphagia Treatment      Objective/Assessment:  Patient progressing towards goals:  Short-term Goals  Timeframe for Short-term Goals: 2-3x week  Goal 1: Pt will complete tongue base exercises to promote increased ability for bolus control and A-P transfer to improve oral phase of swallow with 80% acc and min cues. Goal 2: Pt will tolerate trials of soft and bite sized solids with adequate mastication, timely A-P, with no evidence of pocketing or s/s of aspiration. Pt tolerated minced solids x3 with mild delayed throat clearing  Pt tolerated nectar liquids by cup 4 oz, pt had delayed throat clearing throughout  Goal 3: Pt will participate in an instrumental procedure to assess oropharyngeal function and determine least restrictive diet as deemed appropriate by treating SLP. MBS recommended this date, communicate to [facility], verbalized understanding  Goal 4: Pt will demonstrate small bites/sips and slow strategies for safe and efficient swallow of recommended diet in all given opportunities.   Pt took small sips during trials, but needed cues to use slow rate    Treatment/Activity Tolerance:  Patient

## 2023-10-27 NOTE — PROGRESS NOTES
Shift assessment complete. VSS. A&Ox1. Pt confused, yelling out at times. Pt agitated and would like to be left alone. Educated on the importance of turning. Lung sounds are diminished. On 2L NC. Dyspnea present on exertion. Sinus tachy at times, Murmur present. Denies having chest pain. Peripheral Edema present. 2+ pitting edema. Abdomen is soft and round . Bowel sounds are active. External catheter in place. Redness /excoriation on buttocks. Tolerating dysphagia diet. Meds given per MAR. Pt boosted and repositioned in bed as tolerated. Call light in reach. No needs at this time. Care ongoing.   - U/A collected and sent to LAB    1500 - Attempted to call report to [address]. Per sec. RN is to call me back. Electronically signed by Adri Guillen RN on 10/27/2023 at 3:19 PM     1700 -Patient weaned to room air from 2L. Currently 96% tolerating well. - Report given to Malvin Cali. ABX scripts printed by attending. AVS printed. 1808 - Oxygen sat 93%. Pt tolerating room air.      Electronically signed by Adri Guillen RN on 10/27/2023 at 6:11 PM

## 2023-10-28 VITALS
SYSTOLIC BLOOD PRESSURE: 140 MMHG | DIASTOLIC BLOOD PRESSURE: 75 MMHG | WEIGHT: 199.96 LBS | OXYGEN SATURATION: 98 % | RESPIRATION RATE: 18 BRPM | TEMPERATURE: 97.5 F | BODY MASS INDEX: 27.08 KG/M2 | HEIGHT: 72 IN | HEART RATE: 92 BPM

## 2023-10-28 LAB
GLUCOSE BLD-MCNC: 144 MG/DL (ref 70–99)
PERFORMED ON: ABNORMAL

## 2023-10-28 PROCEDURE — 94761 N-INVAS EAR/PLS OXIMETRY MLT: CPT

## 2023-10-28 PROCEDURE — 6370000000 HC RX 637 (ALT 250 FOR IP): Performed by: INTERNAL MEDICINE

## 2023-10-28 PROCEDURE — 2700000000 HC OXYGEN THERAPY PER DAY

## 2023-10-28 PROCEDURE — 94640 AIRWAY INHALATION TREATMENT: CPT

## 2023-10-28 RX ADMIN — IPRATROPIUM BROMIDE AND ALBUTEROL SULFATE 1 DOSE: 2.5; .5 SOLUTION RESPIRATORY (INHALATION) at 07:35

## 2023-10-28 RX ADMIN — AZITHROMYCIN DIHYDRATE 500 MG: 500 TABLET, FILM COATED ORAL at 12:01

## 2023-10-28 RX ADMIN — ASPIRIN 81 MG CHEWABLE TABLET 81 MG: 81 TABLET CHEWABLE at 12:02

## 2023-10-28 RX ADMIN — METOPROLOL SUCCINATE 25 MG: 25 TABLET, EXTENDED RELEASE ORAL at 12:01

## 2023-10-28 RX ADMIN — IPRATROPIUM BROMIDE AND ALBUTEROL SULFATE 1 DOSE: 2.5; .5 SOLUTION RESPIRATORY (INHALATION) at 11:35

## 2023-10-28 RX ADMIN — AMOXICILLIN AND CLAVULANATE POTASSIUM 1 TABLET: 875; 125 TABLET, FILM COATED ORAL at 12:01

## 2023-10-28 NOTE — CARE COORDINATION
[facility]CTED [facility] SPOKE W/BILL WHO INFORMED THEY ARE \"30 MINUTES OUT. \" INFORMED . WILL FOLLOW.

## 2023-10-29 LAB
BACTERIA BLD CULT ORG #2: NORMAL
BACTERIA BLD CULT: NORMAL

## 2023-10-30 NOTE — PROGRESS NOTES
Physical Therapy  Facility/Department: [facility] MED SURG [id number]  Physical Therapy Discharge      NAME: Alfa Mullins    : 1932 (80 y.o.)  MRN: [id number]    Account: [de-identified]  Gender: male      Patient has been discharged from acute care hospital. DC patient from current PT program.      Electronically signed by Yahir Nevarez PT on 10/30/23 at 12:39 PM EDT

## 2023-11-07 ENCOUNTER — OFFICE VISIT (OUTPATIENT)
Dept: GERIATRIC MEDICINE | Age: 88
End: 2023-11-07

## 2023-11-07 DIAGNOSIS — U07.1 COVID-19 VIRUS INFECTION: Primary | ICD-10-CM

## 2023-11-07 DIAGNOSIS — Z87.898 HISTORY OF DYSPHAGIA: ICD-10-CM

## 2023-11-07 DIAGNOSIS — I50.32 CHRONIC DIASTOLIC HEART FAILURE (HCC): ICD-10-CM

## 2023-11-08 LAB
ALBUMIN SERPL-MCNC: 3.4 G/DL (ref 3.5–4.6)
ALP SERPL-CCNC: 137 U/L (ref 35–104)
ALT SERPL-CCNC: 12 U/L (ref 0–41)
ANION GAP SERPL CALCULATED.3IONS-SCNC: 16 MEQ/L (ref 9–15)
AST SERPL-CCNC: 18 U/L (ref 0–40)
BILIRUB SERPL-MCNC: 1 MG/DL (ref 0.2–0.7)
BUN SERPL-MCNC: 37 MG/DL (ref 8–23)
CALCIUM SERPL-MCNC: 8.8 MG/DL (ref 8.5–9.9)
CHLORIDE SERPL-SCNC: 99 MEQ/L (ref 95–107)
CO2 SERPL-SCNC: 23 MEQ/L (ref 20–31)
CREAT SERPL-MCNC: 1.85 MG/DL (ref 0.7–1.2)
D DIMER PPP FEU-MCNC: 10.65 MG/L FEU (ref 0–0.5)
ERYTHROCYTE [DISTWIDTH] IN BLOOD BY AUTOMATED COUNT: 13.3 % (ref 11.5–14.5)
GLOBULIN SER CALC-MCNC: 3.6 G/DL (ref 2.3–3.5)
GLUCOSE SERPL-MCNC: 174 MG/DL (ref 70–99)
HCT VFR BLD AUTO: 43 % (ref 42–52)
HGB BLD-MCNC: 14.4 G/DL (ref 14–18)
LDH SERPL-CCNC: 265 U/L (ref 135–225)
MCH RBC QN AUTO: 30.8 PG (ref 27–31.3)
MCHC RBC AUTO-ENTMCNC: 33.5 % (ref 33–37)
MCV RBC AUTO: 91.9 FL (ref 79–92.2)
PLATELET # BLD AUTO: 296 K/UL (ref 130–400)
POTASSIUM SERPL-SCNC: 3.6 MEQ/L (ref 3.4–4.9)
PROT SERPL-MCNC: 7 G/DL (ref 6.3–8)
RBC # BLD AUTO: 4.68 M/UL (ref 4.7–6.1)
SODIUM SERPL-SCNC: 138 MEQ/L (ref 135–144)
WBC # BLD AUTO: 10 K/UL (ref 4.8–10.8)

## 2023-11-09 LAB
ANION GAP SERPL CALCULATED.3IONS-SCNC: 15 MEQ/L (ref 9–15)
BUN SERPL-MCNC: 48 MG/DL (ref 8–23)
CALCIUM SERPL-MCNC: 8.1 MG/DL (ref 8.5–9.9)
CHLORIDE SERPL-SCNC: 100 MEQ/L (ref 95–107)
CO2 SERPL-SCNC: 25 MEQ/L (ref 20–31)
CREAT SERPL-MCNC: 1.9 MG/DL (ref 0.7–1.2)
GLUCOSE SERPL-MCNC: 151 MG/DL (ref 70–99)
POTASSIUM SERPL-SCNC: 3.6 MEQ/L (ref 3.4–4.9)
SODIUM SERPL-SCNC: 140 MEQ/L (ref 135–144)

## 2023-11-10 LAB
ANION GAP SERPL CALCULATED.3IONS-SCNC: 11 MEQ/L (ref 9–15)
BUN SERPL-MCNC: 53 MG/DL (ref 8–23)
CALCIUM SERPL-MCNC: 8.1 MG/DL (ref 8.5–9.9)
CHLORIDE SERPL-SCNC: 99 MEQ/L (ref 95–107)
CO2 SERPL-SCNC: 28 MEQ/L (ref 20–31)
CREAT SERPL-MCNC: 2.02 MG/DL (ref 0.7–1.2)
GLUCOSE SERPL-MCNC: 173 MG/DL (ref 70–99)
POTASSIUM SERPL-SCNC: 3.9 MEQ/L (ref 3.4–4.9)
SODIUM SERPL-SCNC: 138 MEQ/L (ref 135–144)

## 2023-11-13 LAB
ANION GAP SERPL CALCULATED.3IONS-SCNC: 15 MEQ/L (ref 9–15)
BUN SERPL-MCNC: 55 MG/DL (ref 8–23)
CALCIUM SERPL-MCNC: 8.4 MG/DL (ref 8.5–9.9)
CHLORIDE SERPL-SCNC: 100 MEQ/L (ref 95–107)
CO2 SERPL-SCNC: 26 MEQ/L (ref 20–31)
CREAT SERPL-MCNC: 1.64 MG/DL (ref 0.7–1.2)
GLUCOSE SERPL-MCNC: 141 MG/DL (ref 70–99)
POTASSIUM SERPL-SCNC: 3.3 MEQ/L (ref 3.4–4.9)
SODIUM SERPL-SCNC: 141 MEQ/L (ref 135–144)

## 2023-11-14 ENCOUNTER — HOSPITAL ENCOUNTER (INPATIENT)
Age: 88
LOS: 6 days | Discharge: HOME | DRG: 884 | End: 2023-11-20
Attending: STUDENT IN AN ORGANIZED HEALTH CARE EDUCATION/TRAINING PROGRAM | Admitting: STUDENT IN AN ORGANIZED HEALTH CARE EDUCATION/TRAINING PROGRAM
Payer: MEDICARE

## 2023-11-14 ENCOUNTER — APPOINTMENT (OUTPATIENT)
Dept: GENERAL RADIOLOGY | Age: 88
DRG: 884 | End: 2023-11-14
Payer: MEDICARE

## 2023-11-14 DIAGNOSIS — R60.9 PERIPHERAL EDEMA: ICD-10-CM

## 2023-11-14 DIAGNOSIS — R53.1 GENERAL WEAKNESS: Primary | ICD-10-CM

## 2023-11-14 DIAGNOSIS — R26.2 UNABLE TO AMBULATE: ICD-10-CM

## 2023-11-14 LAB
ALBUMIN SERPL-MCNC: 3.6 G/DL (ref 3.5–4.6)
ALP SERPL-CCNC: 128 U/L (ref 35–104)
ALT SERPL-CCNC: 14 U/L (ref 0–41)
ANION GAP SERPL CALCULATED.3IONS-SCNC: 12 MEQ/L (ref 9–15)
ANION GAP SERPL CALCULATED.3IONS-SCNC: 2 MEQ/L (ref 9–15)
AST SERPL-CCNC: 20 U/L (ref 0–40)
BASOPHILS # BLD: 0.1 K/UL (ref 0–0.2)
BASOPHILS NFR BLD: 0.7 %
BILIRUB SERPL-MCNC: 1.1 MG/DL (ref 0.2–0.7)
BILIRUB UR QL STRIP: NEGATIVE
BNP BLD-MCNC: 688 PG/ML
BUN SERPL-MCNC: 53 MG/DL (ref 8–23)
BUN SERPL-MCNC: 54 MG/DL (ref 8–23)
CALCIUM SERPL-MCNC: 8.6 MG/DL (ref 8.5–9.9)
CALCIUM SERPL-MCNC: 8.7 MG/DL (ref 8.5–9.9)
CHLORIDE SERPL-SCNC: 98 MEQ/L (ref 95–107)
CHLORIDE SERPL-SCNC: 98 MEQ/L (ref 95–107)
CLARITY UR: CLEAR
CO2 SERPL-SCNC: 28 MEQ/L (ref 20–31)
CO2 SERPL-SCNC: 29 MEQ/L (ref 20–31)
COLOR UR: ABNORMAL
CREAT SERPL-MCNC: 1.67 MG/DL (ref 0.7–1.2)
CREAT SERPL-MCNC: 1.82 MG/DL (ref 0.7–1.2)
EOSINOPHIL # BLD: 0.4 K/UL (ref 0–0.7)
EOSINOPHIL NFR BLD: 4.4 %
ERYTHROCYTE [DISTWIDTH] IN BLOOD BY AUTOMATED COUNT: 13 % (ref 11.5–14.5)
GLOBULIN SER CALC-MCNC: 3.4 G/DL (ref 2.3–3.5)
GLUCOSE SERPL-MCNC: 144 MG/DL (ref 70–99)
GLUCOSE SERPL-MCNC: 190 MG/DL (ref 70–99)
GLUCOSE UR STRIP-MCNC: NEGATIVE MG/DL
HCT VFR BLD AUTO: 41.2 % (ref 42–52)
HGB BLD-MCNC: 13.4 G/DL (ref 14–18)
HGB UR QL STRIP: NEGATIVE
KETONES UR STRIP-MCNC: NEGATIVE MG/DL
LEUKOCYTE ESTERASE UR QL STRIP: NEGATIVE
LYMPHOCYTES # BLD: 1.6 K/UL (ref 1–4.8)
LYMPHOCYTES NFR BLD: 17.8 %
MCH RBC QN AUTO: 30.3 PG (ref 27–31.3)
MCHC RBC AUTO-ENTMCNC: 32.5 % (ref 33–37)
MCV RBC AUTO: 93.2 FL (ref 79–92.2)
MONOCYTES # BLD: 0.9 K/UL (ref 0.2–0.8)
MONOCYTES NFR BLD: 9.4 %
NEUTROPHILS # BLD: 6.1 K/UL (ref 1.4–6.5)
NEUTS SEG NFR BLD: 66.8 %
NITRITE UR QL STRIP: NEGATIVE
PH UR STRIP: 5 [PH] (ref 5–9)
PLATELET # BLD AUTO: 253 K/UL (ref 130–400)
POTASSIUM SERPL-SCNC: 3.4 MEQ/L (ref 3.4–4.9)
POTASSIUM SERPL-SCNC: 3.9 MEQ/L (ref 3.4–4.9)
PROT SERPL-MCNC: 7 G/DL (ref 6.3–8)
PROT UR STRIP-MCNC: NEGATIVE MG/DL
RBC # BLD AUTO: 4.42 M/UL (ref 4.7–6.1)
SODIUM SERPL-SCNC: 129 MEQ/L (ref 135–144)
SODIUM SERPL-SCNC: 138 MEQ/L (ref 135–144)
SP GR UR STRIP: 1.01 (ref 1–1.03)
URINE REFLEX TO CULTURE: ABNORMAL
UROBILINOGEN UR STRIP-ACNC: 1 E.U./DL
WBC # BLD AUTO: 9.2 K/UL (ref 4.8–10.8)

## 2023-11-14 PROCEDURE — 85025 COMPLETE CBC W/AUTO DIFF WBC: CPT

## 2023-11-14 PROCEDURE — 36415 COLL VENOUS BLD VENIPUNCTURE: CPT

## 2023-11-14 PROCEDURE — 2580000003 HC RX 258: Performed by: STUDENT IN AN ORGANIZED HEALTH CARE EDUCATION/TRAINING PROGRAM

## 2023-11-14 PROCEDURE — 1210000000 HC MED SURG R&B

## 2023-11-14 PROCEDURE — 83880 ASSAY OF NATRIURETIC PEPTIDE: CPT

## 2023-11-14 PROCEDURE — 71045 X-RAY EXAM CHEST 1 VIEW: CPT

## 2023-11-14 PROCEDURE — 81003 URINALYSIS AUTO W/O SCOPE: CPT

## 2023-11-14 PROCEDURE — 99285 EMERGENCY DEPT VISIT HI MDM: CPT

## 2023-11-14 PROCEDURE — 80053 COMPREHEN METABOLIC PANEL: CPT

## 2023-11-14 RX ORDER — MAGNESIUM SULFATE IN WATER 40 MG/ML
2000 INJECTION, SOLUTION INTRAVENOUS PRN
Status: DISCONTINUED | OUTPATIENT
Start: 2023-11-14 | End: 2023-11-20 | Stop reason: HOSPADM

## 2023-11-14 RX ORDER — SODIUM CHLORIDE 0.9 % (FLUSH) 0.9 %
5-40 SYRINGE (ML) INJECTION PRN
Status: DISCONTINUED | OUTPATIENT
Start: 2023-11-14 | End: 2023-11-20 | Stop reason: HOSPADM

## 2023-11-14 RX ORDER — POTASSIUM CHLORIDE 7.45 MG/ML
10 INJECTION INTRAVENOUS PRN
Status: DISCONTINUED | OUTPATIENT
Start: 2023-11-14 | End: 2023-11-20 | Stop reason: HOSPADM

## 2023-11-14 RX ORDER — SODIUM CHLORIDE 0.9 % (FLUSH) 0.9 %
5-40 SYRINGE (ML) INJECTION EVERY 12 HOURS SCHEDULED
Status: DISCONTINUED | OUTPATIENT
Start: 2023-11-14 | End: 2023-11-20 | Stop reason: HOSPADM

## 2023-11-14 RX ORDER — SODIUM CHLORIDE 9 MG/ML
INJECTION, SOLUTION INTRAVENOUS PRN
Status: DISCONTINUED | OUTPATIENT
Start: 2023-11-14 | End: 2023-11-20 | Stop reason: HOSPADM

## 2023-11-14 RX ORDER — ONDANSETRON 4 MG/1
4 TABLET, ORALLY DISINTEGRATING ORAL EVERY 8 HOURS PRN
Status: DISCONTINUED | OUTPATIENT
Start: 2023-11-14 | End: 2023-11-20 | Stop reason: HOSPADM

## 2023-11-14 RX ORDER — ACETAMINOPHEN 325 MG/1
650 TABLET ORAL EVERY 6 HOURS PRN
Status: DISCONTINUED | OUTPATIENT
Start: 2023-11-14 | End: 2023-11-20 | Stop reason: HOSPADM

## 2023-11-14 RX ORDER — POLYETHYLENE GLYCOL 3350 17 G/17G
17 POWDER, FOR SOLUTION ORAL DAILY PRN
Status: DISCONTINUED | OUTPATIENT
Start: 2023-11-14 | End: 2023-11-20 | Stop reason: HOSPADM

## 2023-11-14 RX ORDER — ENOXAPARIN SODIUM 100 MG/ML
40 INJECTION SUBCUTANEOUS DAILY
Status: DISCONTINUED | OUTPATIENT
Start: 2023-11-14 | End: 2023-11-20 | Stop reason: HOSPADM

## 2023-11-14 RX ORDER — ACETAMINOPHEN 650 MG/1
650 SUPPOSITORY RECTAL EVERY 6 HOURS PRN
Status: DISCONTINUED | OUTPATIENT
Start: 2023-11-14 | End: 2023-11-20 | Stop reason: HOSPADM

## 2023-11-14 RX ORDER — POTASSIUM CHLORIDE 20 MEQ/1
40 TABLET, EXTENDED RELEASE ORAL PRN
Status: DISCONTINUED | OUTPATIENT
Start: 2023-11-14 | End: 2023-11-20 | Stop reason: HOSPADM

## 2023-11-14 RX ORDER — ONDANSETRON 2 MG/ML
4 INJECTION INTRAMUSCULAR; INTRAVENOUS EVERY 6 HOURS PRN
Status: DISCONTINUED | OUTPATIENT
Start: 2023-11-14 | End: 2023-11-20 | Stop reason: HOSPADM

## 2023-11-14 RX ADMIN — Medication 10 ML: at 23:00

## 2023-11-14 ASSESSMENT — PAIN - FUNCTIONAL ASSESSMENT
PAIN_FUNCTIONAL_ASSESSMENT: NONE - DENIES PAIN

## 2023-11-14 ASSESSMENT — ENCOUNTER SYMPTOMS
ANAL BLEEDING: 0
ABDOMINAL PAIN: 0
SHORTNESS OF BREATH: 0
COLOR CHANGE: 1
VOMITING: 0
NAUSEA: 0
EYE DISCHARGE: 0
ABDOMINAL DISTENTION: 0
DIARRHEA: 0
CHEST TIGHTNESS: 0
BACK PAIN: 0
VOICE CHANGE: 0
CONSTIPATION: 0
COUGH: 0

## 2023-11-14 ASSESSMENT — PAIN SCALES - GENERAL: PAINLEVEL_OUTOF10: 0

## 2023-11-14 NOTE — ED PROVIDER NOTES
[facility] ED  EMERGENCY DEPARTMENT ENCOUNTER      Pt Name: Paige Cruz  MRN: [id number]  [address] [facility] 11/30/1932  Date of evaluation: 11/14/2023  Provider: Alfie Manuel PA-C  3:31 PM EST    CHIEF COMPLAINT       Chief Complaint   Patient presents with    Failure To Thrive     Pt lives at [facility]. Pt has dementia and can no longer walk on his own and they need him placed elsewhere         HISTORY OF PRESENT ILLNESS   (Location/Symptom, Timing/Onset, Context/Setting, Quality, Duration, Modifying Factors, Severity)  Note limiting factors. Paige Cruz is a 80 y.o. male who presents to the emergency department patient lives at assisted living at [facility]. They note on transfe from the [facility] patient to have increasing weakness inability to ambulate. They state he is in assisted living needs to be placed. Patient denies any fever chills cough congestion pain with urination he denies any pain including chest pain belly pain leg pain states his legs are weak. Symptoms mild to moderate severity nothing proves worsen symptom    HPI    Nursing Notes were reviewed. REVIEW OF SYSTEMS    (2-9 systems for level 4, 10 or more for level 5)     Review of Systems   Constitutional:  Negative for activity change, appetite change, chills and fever. HENT:  Negative for ear discharge, nosebleeds and voice change. Eyes:  Negative for discharge. Respiratory:  Negative for cough. Cardiovascular:  Positive for leg swelling. Negative for chest pain. Gastrointestinal:  Negative for abdominal distention, anal bleeding, nausea and vomiting. Genitourinary:  Negative for dysuria and hematuria. Musculoskeletal:  Negative for arthralgias, back pain and joint swelling. Skin:  Positive for color change. Negative for pallor. Neurological:  Negative for seizures and facial asymmetry. Hematological:  Bruises/bleeds easily.    Psychiatric/Behavioral:  Negative for

## 2023-11-14 NOTE — ED NOTES
Pt keeps yelling for help. We keep going in and pt just wants to talk. Sitter available and placed at bedside.      Aylin Dubon RN  11/14/23 1659

## 2023-11-14 NOTE — ED NOTES
The following labs were labeled with appropriate pt sticker and tubed to lab:     [] Blue     [x] Lavender   [] on ice  [x] Green/yellow  [] Green/black [] on ice  [] Gordo Hams  [] on ice  [] Yellow  [] Red  [] Pink  [] Type/ Screen  [] ABG  [] VBG    [] COVID-19 swab    [] Rapid  [] PCR  [] Flu swab  [] Peds Viral Panel     [] Urine Sample  [] Fecal Sample  [] Pelvic Cultures  [] Blood Cultures  [] X 2  [] STREP Cultures      Lynne Munguia RN  11/14/23 4502

## 2023-11-14 NOTE — ED TRIAGE NOTES
A & Ox2. Skin pink warm and dry. Pt denies any complaints. Per td, [employer] doesn't want him back d/t him not being able to get around on his own. No falls recently but can't get out of bed alone.

## 2023-11-14 NOTE — ED NOTES
Called [facility] to inquire more about why they sent patient in. States he has had a cognitive decline since being in the hospital in October with pneumonia. Refusing his care, yelling at staff. Hitting the staff out of fear of falling, not because he's being mean. Labs show he needs IV fluids (11/9 labs were Chloride 108, Na+ 142, K+ 3.8, BUN 21, Creat 0.87).  Family wants him admitted to a SNF, [address] [place] [address] or [address] [facility] in [place], [place]  11/14/23 9731

## 2023-11-14 NOTE — ED NOTES
Labs collected and sent via tube system. Male pure-wick applied in order to obtain UA. Pt appears calm and responds to questions.       Abilio Mccormick, SOUMYA  11/14/23 3076

## 2023-11-14 NOTE — CARE COORDINATION
This nurse spoke with the PT therapist who states that they need some documentation as to any testing that is to be done and medical clearance to do their evaluations. This nurse reported this to So KOTHARI who stated to me \"then I'll admit them overnight. \"

## 2023-11-15 PROCEDURE — 6370000000 HC RX 637 (ALT 250 FOR IP): Performed by: INTERNAL MEDICINE

## 2023-11-15 PROCEDURE — 97162 PT EVAL MOD COMPLEX 30 MIN: CPT

## 2023-11-15 PROCEDURE — 97167 OT EVAL HIGH COMPLEX 60 MIN: CPT

## 2023-11-15 PROCEDURE — 6360000002 HC RX W HCPCS: Performed by: STUDENT IN AN ORGANIZED HEALTH CARE EDUCATION/TRAINING PROGRAM

## 2023-11-15 PROCEDURE — 1210000000 HC MED SURG R&B

## 2023-11-15 PROCEDURE — 2580000003 HC RX 258: Performed by: STUDENT IN AN ORGANIZED HEALTH CARE EDUCATION/TRAINING PROGRAM

## 2023-11-15 RX ORDER — LIDOCAINE 4 G/G
3 PATCH TOPICAL DAILY
Status: DISCONTINUED | OUTPATIENT
Start: 2023-11-15 | End: 2023-11-20 | Stop reason: HOSPADM

## 2023-11-15 RX ORDER — BUSPIRONE HYDROCHLORIDE 5 MG/1
5 TABLET ORAL 2 TIMES DAILY
COMMUNITY

## 2023-11-15 RX ADMIN — ENOXAPARIN SODIUM 40 MG: 100 INJECTION SUBCUTANEOUS at 08:39

## 2023-11-15 RX ADMIN — Medication 10 ML: at 08:40

## 2023-11-15 ASSESSMENT — PAIN SCALES - GENERAL
PAINLEVEL_OUTOF10: 0
PAINLEVEL_OUTOF10: 0

## 2023-11-15 NOTE — CARE COORDINATION
Readmission Assessment  Number of Days since last admission?: 8-30 days  Previous Disposition: SNF  Who is being Interviewed: Caregiver (daughter/dpsagar Dania)  What was the patient's/caregiver's perception as to why they think they needed to return back to the hospital?: Other (Comment) (general weakness per SNF)  Did you visit your Primary Care Physician after you left the hospital, before you returned this time?: Yes (he is seen by Dr Alonso Jordan.)  Did you see a specialist, such as Cardiac, Pulmonary, Orthopedic Physician, etc. after you left the hospital?: No  Who advised the patient to return to the hospital?: Skilled Unit  Does the patient report anything that got in the way of taking their medications?: No (assisted living)    Case Management Assessment  Initial Evaluation    Date/Time of Evaluation: 11/14/2023 8:02 PM  Assessment Completed by: Justine Hagen RN    If patient is discharged prior to next notation, then this note serves as note for discharge by case management. Patient Name: Kailey Liriano                   YOB: 1932  Diagnosis: Generalized weakness [R53.1]                   Date / Time: 11/14/2023  3:19 PM    Patient Admission Status: Inpatient   Readmission Risk (Low < 19, Mod (19-27), High > 27): Readmission Risk Score: 18.4    Current PCP: Anita Estrada MD  PCP verified by CM? (P) Yes    Chart Reviewed: Yes      History Provided by: (P) Patient, Child/Family (pt able to answer some questions. nataly Moran gave the rest of the information.)  Patient Orientation: (P) Alert and Oriented, Person, Place    Patient Cognition: (P) Dementia / Early Alzheimer's    Hospitalization in the last 30 days (Readmission):  Yes    If yes, Readmission Assessment in  Navigator will be completed.     Advance Directives:      Code Status: Full Code   Patient's Primary Decision Maker is: (P) Legal Next of Kin (daughter Tom Moran is dpoa and second is his wife)    Primary Decision Maker:

## 2023-11-15 NOTE — FLOWSHEET NOTE
Am nursing  assessment completed. Pt :  resting in bed. Alert and oriented to person. Agitated with care     Diet: set up with breakfast  Oxygen: room air  Complaints of:  generalized back pain         IV:     SL         patent/ flushed/ capped, no signs of infiltration noted, dressing clean/dry/intact. TELE:  n/a                Dressings:   see LDA                        Precautions:    1:1 Sitter care          Falls:  50    Emir: 12  Chart and meds reviewed. Noted Labs:  na 138 k 3.9 bun 53 cr1.67  Plan for today:    Bed wheels locked and in lowest position. Call light and bedside table within reach. NOTES:    1023 AVASYS at bedside. Will trial with sitter one hour. 1134 1:1 sitter yonatan'june. AVASYS at bedside. Electronically signed by Mary Jane Stone RN on 11/15/2023 at 11:34 AM    1200 bath and reposition. Pt yells loudly and resists care due to generalized back discomfort. Lidocaine patches applied. Mepilex to excoriation on coccyx. Wound care eval ordered. Electronically signed by Mary Jane Stone RN on 11/15/2023 at 1:36 PM    [phone] secure message sent requesting home med review.  Electronically signed by Mary Jane Stone RN on 11/15/2023 at 6:09 PM

## 2023-11-15 NOTE — ACP (ADVANCE CARE PLANNING)
Advance Care Planning     Advance Care Planning Activator (Inpatient)  Conversation Note      Date of ACP Conversation: 11/14/2023     Conversation Conducted with: daughter Ada Grace    ACP Activator: Shania Hannah, RN    Ms Jammie Muñoz states that she and her mother had a discussion with his admitting Dr last admission and that a DNRCC-Arrest was the chosen code status.  This was reported to the patient's attending Dr. Bryant:     Current Designated Health Care Decision Maker:     Primary Decision Maker: Flo Rubalcava - [phone]    Secondary Decision Maker: Manuelito Pinto - Spouse - [phone]

## 2023-11-15 NOTE — PLAN OF CARE
Poc initiated. Pt resting in bed with 1:1 sitter at bedside. Denies needs at this time.  Will continue to monitor

## 2023-11-15 NOTE — H&P
[facility]    HISTORY AND PHYSICAL EXAM    PATIENT NAME:  Ashish Clayton    MRN:  [id number]  SERVICE DATE:  11/14/2023   SERVICE TIME:  9:54 PM    Primary Care Physician: Magaly Hamilton MD     SUBJECTIVE  CHIEF COMPLAINT:  Failure To Thrive (Pt lives at [facility]. Pt has dementia and can no longer walk on his own and they need him placed elsewhere)       HPI      Ashish Clayton is a 80 y.o., male with PMH dementia, HLD, CHF who  presents to the emergency department with chief complaint of failure to thrive. Patient was pleasantly confused during evaluation and AAOx1. He could not tell me why he was in ED. Patient resides at assisted living facility [facility]) and was brought to ED as staff there has noticed increasing weakness and trouble ambulating. He denies to me any dizziness, lightheadedness, numbness, fevers, chills, diarrhea, or dysuria. He does have a recent history of fall last month. Patient also treated inpatient for CAP 10/24-10/27 where he was noted to be weak. Admission workup notable for pro- (3.7k 10/24), Cr 1.67 (1.85 11/8, 0.87 10/27), WBC normal, CXR and UA unremarkable. The primary encounter diagnosis was General weakness. Diagnoses of Unable to ambulate and Peripheral edema were also pertinent to this visit. PAST MEDICAL HISTORY:    Past Medical History:   Diagnosis Date    Bilateral lower extremity edema     Carotid stenosis     Dementia (HCC)     Diastolic CHF, acute on chronic (HCC)     mitral stenosis    FH: carotid endarterectomy     Hiatal hernia     Hypoxia     Loss of coordination     Muscle weakness     Pharyngeal dysphagia     Pneumonia       PAST SURGICAL HISTORY:  History reviewed. No pertinent surgical history. FAMILY HISTORY:  History reviewed. No pertinent family history.   SOCIAL HISTORY:    Social History     Socioeconomic History    Marital status:      Spouse name: Not on file    Number of

## 2023-11-15 NOTE — CARE COORDINATION
This LSW visited patient at bedside this am. I also called and spoke with daughter, Renee Quintana at 12:05pm. Daughter requesting that SNF referral be sent to: 1.) [facility], 2.) [facility] on [address]. I sent referral to [address] [facility] [address] at [facility], awaiting response at this time. LSW /TANK to follow.   Electronically signed by SHANNAN Muniz, DALILA on 11/15/23 at 12:09 PM EST

## 2023-11-16 LAB
ANION GAP SERPL CALCULATED.3IONS-SCNC: 11 MEQ/L (ref 9–15)
BUN SERPL-MCNC: 36 MG/DL (ref 8–23)
CALCIUM SERPL-MCNC: 8.3 MG/DL (ref 8.5–9.9)
CHLORIDE SERPL-SCNC: 103 MEQ/L (ref 95–107)
CO2 SERPL-SCNC: 29 MEQ/L (ref 20–31)
CREAT SERPL-MCNC: 1.28 MG/DL (ref 0.7–1.2)
GLUCOSE SERPL-MCNC: 143 MG/DL (ref 70–99)
MAGNESIUM SERPL-MCNC: 2.1 MG/DL (ref 1.7–2.4)
POTASSIUM SERPL-SCNC: 3.4 MEQ/L (ref 3.4–4.9)
SODIUM SERPL-SCNC: 143 MEQ/L (ref 135–144)

## 2023-11-16 PROCEDURE — 83735 ASSAY OF MAGNESIUM: CPT

## 2023-11-16 PROCEDURE — 6360000002 HC RX W HCPCS: Performed by: STUDENT IN AN ORGANIZED HEALTH CARE EDUCATION/TRAINING PROGRAM

## 2023-11-16 PROCEDURE — 36415 COLL VENOUS BLD VENIPUNCTURE: CPT

## 2023-11-16 PROCEDURE — 2500000003 HC RX 250 WO HCPCS: Performed by: NURSE PRACTITIONER

## 2023-11-16 PROCEDURE — 6370000000 HC RX 637 (ALT 250 FOR IP): Performed by: INTERNAL MEDICINE

## 2023-11-16 PROCEDURE — 2580000003 HC RX 258: Performed by: STUDENT IN AN ORGANIZED HEALTH CARE EDUCATION/TRAINING PROGRAM

## 2023-11-16 PROCEDURE — 99213 OFFICE O/P EST LOW 20 MIN: CPT

## 2023-11-16 PROCEDURE — 80048 BASIC METABOLIC PNL TOTAL CA: CPT

## 2023-11-16 PROCEDURE — 1210000000 HC MED SURG R&B

## 2023-11-16 PROCEDURE — 6370000000 HC RX 637 (ALT 250 FOR IP): Performed by: NURSE PRACTITIONER

## 2023-11-16 RX ORDER — IPRATROPIUM BROMIDE AND ALBUTEROL SULFATE 2.5; .5 MG/3ML; MG/3ML
1 SOLUTION RESPIRATORY (INHALATION) EVERY 4 HOURS PRN
Status: DISCONTINUED | OUTPATIENT
Start: 2023-11-16 | End: 2023-11-20 | Stop reason: HOSPADM

## 2023-11-16 RX ORDER — ASPIRIN 81 MG/1
81 TABLET, CHEWABLE ORAL DAILY
Status: DISCONTINUED | OUTPATIENT
Start: 2023-11-16 | End: 2023-11-20 | Stop reason: HOSPADM

## 2023-11-16 RX ORDER — TORSEMIDE 20 MG/1
20 TABLET ORAL DAILY
Status: DISCONTINUED | OUTPATIENT
Start: 2023-11-16 | End: 2023-11-20 | Stop reason: HOSPADM

## 2023-11-16 RX ORDER — PRAVASTATIN SODIUM 40 MG
80 TABLET ORAL EVERY EVENING
Status: DISCONTINUED | OUTPATIENT
Start: 2023-11-16 | End: 2023-11-20 | Stop reason: HOSPADM

## 2023-11-16 RX ORDER — BUSPIRONE HYDROCHLORIDE 5 MG/1
5 TABLET ORAL 2 TIMES DAILY
Status: DISCONTINUED | OUTPATIENT
Start: 2023-11-16 | End: 2023-11-20 | Stop reason: HOSPADM

## 2023-11-16 RX ORDER — METOPROLOL SUCCINATE 25 MG/1
25 TABLET, EXTENDED RELEASE ORAL DAILY
Status: DISCONTINUED | OUTPATIENT
Start: 2023-11-16 | End: 2023-11-20 | Stop reason: HOSPADM

## 2023-11-16 RX ADMIN — TORSEMIDE 20 MG: 20 TABLET ORAL at 09:28

## 2023-11-16 RX ADMIN — BUSPIRONE HYDROCHLORIDE 5 MG: 5 TABLET ORAL at 19:42

## 2023-11-16 RX ADMIN — METOPROLOL SUCCINATE 25 MG: 25 TABLET, FILM COATED, EXTENDED RELEASE ORAL at 09:29

## 2023-11-16 RX ADMIN — PRAVASTATIN SODIUM 80 MG: 40 TABLET ORAL at 18:48

## 2023-11-16 RX ADMIN — BUSPIRONE HYDROCHLORIDE 5 MG: 5 TABLET ORAL at 09:29

## 2023-11-16 RX ADMIN — BUSPIRONE HYDROCHLORIDE 5 MG: 5 TABLET ORAL at 01:24

## 2023-11-16 RX ADMIN — MICONAZOLE NITRATE: 2 POWDER TOPICAL at 09:30

## 2023-11-16 RX ADMIN — Medication 10 ML: at 09:31

## 2023-11-16 RX ADMIN — MICONAZOLE NITRATE: 2 POWDER TOPICAL at 19:42

## 2023-11-16 RX ADMIN — Medication 10 ML: at 19:43

## 2023-11-16 RX ADMIN — ASPIRIN 81 MG 81 MG: 81 TABLET ORAL at 09:29

## 2023-11-16 RX ADMIN — ENOXAPARIN SODIUM 40 MG: 100 INJECTION SUBCUTANEOUS at 09:28

## 2023-11-16 ASSESSMENT — PAIN SCALES - GENERAL: PAINLEVEL_OUTOF10: 0

## 2023-11-16 NOTE — CARE COORDINATION
This LSW received notification that patients referral was not accepted at [facility]. I sent referral to daughter's 2nd choice: [facility] . Awaiting response at this time. LSW/TANK to follow.   Electronically signed by SHANNAN Morris, LSW on 11/16/23 at 3:06 PM EST

## 2023-11-17 LAB
ANION GAP SERPL CALCULATED.3IONS-SCNC: 11 MEQ/L (ref 9–15)
BUN SERPL-MCNC: 31 MG/DL (ref 8–23)
CALCIUM SERPL-MCNC: 8.6 MG/DL (ref 8.5–9.9)
CHLORIDE SERPL-SCNC: 101 MEQ/L (ref 95–107)
CO2 SERPL-SCNC: 30 MEQ/L (ref 20–31)
CREAT SERPL-MCNC: 1.17 MG/DL (ref 0.7–1.2)
GLUCOSE SERPL-MCNC: 147 MG/DL (ref 70–99)
MAGNESIUM SERPL-MCNC: 2 MG/DL (ref 1.7–2.4)
POTASSIUM SERPL-SCNC: 3.4 MEQ/L (ref 3.4–4.9)
SODIUM SERPL-SCNC: 142 MEQ/L (ref 135–144)

## 2023-11-17 PROCEDURE — 83735 ASSAY OF MAGNESIUM: CPT

## 2023-11-17 PROCEDURE — 6370000000 HC RX 637 (ALT 250 FOR IP): Performed by: NURSE PRACTITIONER

## 2023-11-17 PROCEDURE — 2580000003 HC RX 258: Performed by: STUDENT IN AN ORGANIZED HEALTH CARE EDUCATION/TRAINING PROGRAM

## 2023-11-17 PROCEDURE — 97535 SELF CARE MNGMENT TRAINING: CPT

## 2023-11-17 PROCEDURE — 6370000000 HC RX 637 (ALT 250 FOR IP): Performed by: INTERNAL MEDICINE

## 2023-11-17 PROCEDURE — 36415 COLL VENOUS BLD VENIPUNCTURE: CPT

## 2023-11-17 PROCEDURE — 80048 BASIC METABOLIC PNL TOTAL CA: CPT

## 2023-11-17 PROCEDURE — 1210000000 HC MED SURG R&B

## 2023-11-17 PROCEDURE — 6370000000 HC RX 637 (ALT 250 FOR IP): Performed by: STUDENT IN AN ORGANIZED HEALTH CARE EDUCATION/TRAINING PROGRAM

## 2023-11-17 RX ORDER — SODIUM CHLORIDE, SODIUM LACTATE, POTASSIUM CHLORIDE, AND CALCIUM CHLORIDE .6; .31; .03; .02 G/100ML; G/100ML; G/100ML; G/100ML
1000 INJECTION, SOLUTION INTRAVENOUS ONCE
Status: COMPLETED | OUTPATIENT
Start: 2023-11-17 | End: 2023-11-17

## 2023-11-17 RX ADMIN — BUSPIRONE HYDROCHLORIDE 5 MG: 5 TABLET ORAL at 10:27

## 2023-11-17 RX ADMIN — TORSEMIDE 20 MG: 20 TABLET ORAL at 10:28

## 2023-11-17 RX ADMIN — POTASSIUM BICARBONATE 40 MEQ: 782 TABLET, EFFERVESCENT ORAL at 20:52

## 2023-11-17 RX ADMIN — BUSPIRONE HYDROCHLORIDE 5 MG: 5 TABLET ORAL at 20:52

## 2023-11-17 RX ADMIN — MICONAZOLE NITRATE: 2 POWDER TOPICAL at 21:39

## 2023-11-17 RX ADMIN — ASPIRIN 81 MG 81 MG: 81 TABLET ORAL at 10:27

## 2023-11-17 RX ADMIN — MICONAZOLE NITRATE: 2 POWDER TOPICAL at 10:29

## 2023-11-17 RX ADMIN — Medication 10 ML: at 20:52

## 2023-11-17 RX ADMIN — PRAVASTATIN SODIUM 80 MG: 40 TABLET ORAL at 18:54

## 2023-11-17 RX ADMIN — Medication 10 ML: at 10:28

## 2023-11-17 RX ADMIN — SODIUM CHLORIDE, POTASSIUM CHLORIDE, SODIUM LACTATE AND CALCIUM CHLORIDE 1000 ML: 600; 310; 30; 20 INJECTION, SOLUTION INTRAVENOUS at 10:49

## 2023-11-17 NOTE — DISCHARGE INSTR - COC
unmeasurable) 11/16/23 0930   Drainage Description Sanguinous 11/16/23 0930   Odor None 11/16/23 0930   Oxana-wound Assessment Intact 11/16/23 0930   Margins Undefined edges 11/16/23 0930   Number of days: 3       Wound 11/14/23 Heel Right;Lateral (Active)   Wound Etiology Pressure Stage 2 11/16/23 0930   Dressing Status New dressing applied 11/16/23 0930   Dressing/Treatment Foam 11/16/23 0930   Dressing Change Due 11/20/23 11/16/23 0930   Wound Length (cm) 0.5 cm 11/16/23 0930   Wound Width (cm) 1.5 cm 11/16/23 0930   Wound Depth (cm) 0 cm 11/16/23 0930   Wound Surface Area (cm^2) 0.75 cm^2 11/16/23 0930   Wound Volume (cm^3) 0 cm^3 11/16/23 0930   Wound Assessment Fluid filled blister 11/16/23 0930   Drainage Amount None (dry) 11/16/23 0930   Odor None 11/16/23 0930   Oxana-wound Assessment Blanchable erythema 11/16/23 0930   Margins Defined edges 11/16/23 0930   Number of days: 3        Elimination:  Continence: Bowel: No  Bladder: No  Urinary Catheter: None   Colostomy/Ileostomy/Ileal Conduit: No       Date of Last BM: 11/20    Intake/Output Summary (Last 24 hours) at 11/17/2023 1136  Last data filed at 11/17/2023 0646  Gross per 24 hour   Intake 120 ml   Output 1100 ml   Net -980 ml     I/O last 3 completed shifts: In: 240 [P.O.:240]  Out: 1500 [Urine:1500]    Safety Concerns:     Sundowners [place] and At Risk for Falls    Impairments/Disabilities:      Vision and Hearing    Nutrition Therapy:  Current Nutrition Therapy:   - Oral Diet:  Dysphagia - Minced and Moist    Routes of Feeding: Oral  Liquids: Nectar Thick Liquids  Daily Fluid Restriction: no  Last Modified Barium Swallow with Video (Video Swallowing Test): done on 5/11/23/***    Treatments at the Time of Hospital Discharge:   Respiratory Treatments: none  Oxygen Therapy:  is not on home oxygen therapy.   Ventilator:    - No ventilator support    Rehab Therapies: Physical Therapy, Occupational Therapy, and Speech/Language Therapy  Weight Bearing

## 2023-11-17 NOTE — CARE COORDINATION
This LSW received notification that patients referral has been accepted at [facility] on [address]. Patients insurance authorization was initiated on 11/17/2023. Patient will transfer to [facility] once medical clearance and insurance authorization have been obtained. LSW/CM to follow.   Electronically signed by SHANNAN Winslow, DALILA on 11/17/23 at 11:24 AM EST

## 2023-11-17 NOTE — PLAN OF CARE
Problem: Nutrition Deficit:  Goal: Optimize nutritional status  Outcome: Not Progressing  Flowsheets (Taken 11/15/2023 1415)  Nutrient intake appropriate for improving, restoring, or maintaining nutritional needs:   Assess nutritional status and recommend course of action   Monitor oral intake, labs, and treatment plans   Recommend appropriate diets, oral nutritional supplements, and vitamin/mineral supplements   Recommend, monitor, and adjust tube feedings and TPN/PPN based on assessed needs

## 2023-11-18 LAB
ANION GAP SERPL CALCULATED.3IONS-SCNC: 11 MEQ/L (ref 9–15)
BUN SERPL-MCNC: 28 MG/DL (ref 8–23)
CALCIUM SERPL-MCNC: 8.7 MG/DL (ref 8.5–9.9)
CHLORIDE SERPL-SCNC: 100 MEQ/L (ref 95–107)
CO2 SERPL-SCNC: 31 MEQ/L (ref 20–31)
CREAT SERPL-MCNC: 1.32 MG/DL (ref 0.7–1.2)
GLUCOSE SERPL-MCNC: 202 MG/DL (ref 70–99)
POTASSIUM SERPL-SCNC: 3.9 MEQ/L (ref 3.4–4.9)
SODIUM SERPL-SCNC: 142 MEQ/L (ref 135–144)

## 2023-11-18 PROCEDURE — 80048 BASIC METABOLIC PNL TOTAL CA: CPT

## 2023-11-18 PROCEDURE — 1210000000 HC MED SURG R&B

## 2023-11-18 PROCEDURE — 2580000003 HC RX 258: Performed by: STUDENT IN AN ORGANIZED HEALTH CARE EDUCATION/TRAINING PROGRAM

## 2023-11-18 PROCEDURE — 6360000002 HC RX W HCPCS: Performed by: STUDENT IN AN ORGANIZED HEALTH CARE EDUCATION/TRAINING PROGRAM

## 2023-11-18 PROCEDURE — 36415 COLL VENOUS BLD VENIPUNCTURE: CPT

## 2023-11-18 PROCEDURE — 6370000000 HC RX 637 (ALT 250 FOR IP): Performed by: NURSE PRACTITIONER

## 2023-11-18 RX ADMIN — MICONAZOLE NITRATE: 2 POWDER TOPICAL at 10:39

## 2023-11-18 RX ADMIN — Medication 5 ML: at 20:07

## 2023-11-18 RX ADMIN — BUSPIRONE HYDROCHLORIDE 5 MG: 5 TABLET ORAL at 20:07

## 2023-11-18 RX ADMIN — MICONAZOLE NITRATE: 2 POWDER TOPICAL at 20:07

## 2023-11-18 RX ADMIN — ENOXAPARIN SODIUM 40 MG: 100 INJECTION SUBCUTANEOUS at 10:32

## 2023-11-18 RX ADMIN — PRAVASTATIN SODIUM 80 MG: 40 TABLET ORAL at 20:07

## 2023-11-18 RX ADMIN — TORSEMIDE 20 MG: 20 TABLET ORAL at 10:31

## 2023-11-18 RX ADMIN — Medication 10 ML: at 10:32

## 2023-11-18 RX ADMIN — ASPIRIN 81 MG 81 MG: 81 TABLET ORAL at 10:32

## 2023-11-18 RX ADMIN — METOPROLOL SUCCINATE 25 MG: 25 TABLET, FILM COATED, EXTENDED RELEASE ORAL at 10:31

## 2023-11-18 RX ADMIN — BUSPIRONE HYDROCHLORIDE 5 MG: 5 TABLET ORAL at 10:31

## 2023-11-18 ASSESSMENT — PAIN SCALES - WONG BAKER
WONGBAKER_NUMERICALRESPONSE: 0
WONGBAKER_NUMERICALRESPONSE: 0

## 2023-11-19 LAB
ANION GAP SERPL CALCULATED.3IONS-SCNC: 10 MEQ/L (ref 9–15)
BUN SERPL-MCNC: 34 MG/DL (ref 8–23)
CALCIUM SERPL-MCNC: 8.5 MG/DL (ref 8.5–9.9)
CHLORIDE SERPL-SCNC: 98 MEQ/L (ref 95–107)
CO2 SERPL-SCNC: 31 MEQ/L (ref 20–31)
CREAT SERPL-MCNC: 1.79 MG/DL (ref 0.7–1.2)
GLUCOSE SERPL-MCNC: 184 MG/DL (ref 70–99)
POTASSIUM SERPL-SCNC: 3.8 MEQ/L (ref 3.4–4.9)
SODIUM SERPL-SCNC: 139 MEQ/L (ref 135–144)

## 2023-11-19 PROCEDURE — 2580000003 HC RX 258: Performed by: STUDENT IN AN ORGANIZED HEALTH CARE EDUCATION/TRAINING PROGRAM

## 2023-11-19 PROCEDURE — 80048 BASIC METABOLIC PNL TOTAL CA: CPT

## 2023-11-19 PROCEDURE — 36415 COLL VENOUS BLD VENIPUNCTURE: CPT

## 2023-11-19 PROCEDURE — 6360000002 HC RX W HCPCS: Performed by: STUDENT IN AN ORGANIZED HEALTH CARE EDUCATION/TRAINING PROGRAM

## 2023-11-19 PROCEDURE — 1210000000 HC MED SURG R&B

## 2023-11-19 PROCEDURE — 2580000003 HC RX 258: Performed by: INTERNAL MEDICINE

## 2023-11-19 PROCEDURE — 6370000000 HC RX 637 (ALT 250 FOR IP): Performed by: NURSE PRACTITIONER

## 2023-11-19 PROCEDURE — 6370000000 HC RX 637 (ALT 250 FOR IP): Performed by: INTERNAL MEDICINE

## 2023-11-19 RX ORDER — SODIUM CHLORIDE 9 MG/ML
INJECTION, SOLUTION INTRAVENOUS CONTINUOUS
Status: DISCONTINUED | OUTPATIENT
Start: 2023-11-19 | End: 2023-11-20 | Stop reason: HOSPADM

## 2023-11-19 RX ADMIN — MICONAZOLE NITRATE: 2 POWDER TOPICAL at 20:31

## 2023-11-19 RX ADMIN — MICONAZOLE NITRATE: 2 POWDER TOPICAL at 10:21

## 2023-11-19 RX ADMIN — ASPIRIN 81 MG 81 MG: 81 TABLET ORAL at 10:06

## 2023-11-19 RX ADMIN — ENOXAPARIN SODIUM 40 MG: 100 INJECTION SUBCUTANEOUS at 10:06

## 2023-11-19 RX ADMIN — TORSEMIDE 20 MG: 20 TABLET ORAL at 10:06

## 2023-11-19 RX ADMIN — Medication 10 ML: at 10:22

## 2023-11-19 RX ADMIN — SODIUM CHLORIDE: 9 INJECTION, SOLUTION INTRAVENOUS at 10:34

## 2023-11-19 RX ADMIN — PRAVASTATIN SODIUM 80 MG: 40 TABLET ORAL at 20:30

## 2023-11-19 RX ADMIN — BUSPIRONE HYDROCHLORIDE 5 MG: 5 TABLET ORAL at 10:06

## 2023-11-19 RX ADMIN — METOPROLOL SUCCINATE 25 MG: 25 TABLET, FILM COATED, EXTENDED RELEASE ORAL at 10:06

## 2023-11-19 RX ADMIN — BUSPIRONE HYDROCHLORIDE 5 MG: 5 TABLET ORAL at 20:30

## 2023-11-19 ASSESSMENT — PAIN SCALES - GENERAL: PAINLEVEL_OUTOF10: 0

## 2023-11-20 VITALS
WEIGHT: 186.9 LBS | BODY MASS INDEX: 24.77 KG/M2 | RESPIRATION RATE: 17 BRPM | TEMPERATURE: 98.5 F | OXYGEN SATURATION: 91 % | HEIGHT: 73 IN | SYSTOLIC BLOOD PRESSURE: 123 MMHG | DIASTOLIC BLOOD PRESSURE: 75 MMHG | HEART RATE: 101 BPM

## 2023-11-20 LAB
ANION GAP SERPL CALCULATED.3IONS-SCNC: 11 MEQ/L (ref 9–15)
BUN SERPL-MCNC: 33 MG/DL (ref 8–23)
CALCIUM SERPL-MCNC: 8.3 MG/DL (ref 8.5–9.9)
CHLORIDE SERPL-SCNC: 100 MEQ/L (ref 95–107)
CO2 SERPL-SCNC: 29 MEQ/L (ref 20–31)
CREAT SERPL-MCNC: 1.45 MG/DL (ref 0.7–1.2)
GLUCOSE SERPL-MCNC: 157 MG/DL (ref 70–99)
POTASSIUM SERPL-SCNC: 3.7 MEQ/L (ref 3.4–4.9)
SODIUM SERPL-SCNC: 140 MEQ/L (ref 135–144)

## 2023-11-20 PROCEDURE — 6370000000 HC RX 637 (ALT 250 FOR IP): Performed by: INTERNAL MEDICINE

## 2023-11-20 PROCEDURE — 6370000000 HC RX 637 (ALT 250 FOR IP): Performed by: STUDENT IN AN ORGANIZED HEALTH CARE EDUCATION/TRAINING PROGRAM

## 2023-11-20 PROCEDURE — 80048 BASIC METABOLIC PNL TOTAL CA: CPT

## 2023-11-20 PROCEDURE — 2580000003 HC RX 258: Performed by: INTERNAL MEDICINE

## 2023-11-20 PROCEDURE — 6370000000 HC RX 637 (ALT 250 FOR IP): Performed by: NURSE PRACTITIONER

## 2023-11-20 PROCEDURE — 36415 COLL VENOUS BLD VENIPUNCTURE: CPT

## 2023-11-20 PROCEDURE — 2580000003 HC RX 258: Performed by: STUDENT IN AN ORGANIZED HEALTH CARE EDUCATION/TRAINING PROGRAM

## 2023-11-20 PROCEDURE — 6360000002 HC RX W HCPCS: Performed by: STUDENT IN AN ORGANIZED HEALTH CARE EDUCATION/TRAINING PROGRAM

## 2023-11-20 RX ADMIN — ENOXAPARIN SODIUM 40 MG: 100 INJECTION SUBCUTANEOUS at 10:27

## 2023-11-20 RX ADMIN — ACETAMINOPHEN 650 MG: 325 TABLET ORAL at 10:28

## 2023-11-20 RX ADMIN — SODIUM CHLORIDE: 9 INJECTION, SOLUTION INTRAVENOUS at 05:55

## 2023-11-20 RX ADMIN — METOPROLOL SUCCINATE 25 MG: 25 TABLET, FILM COATED, EXTENDED RELEASE ORAL at 10:28

## 2023-11-20 RX ADMIN — BUSPIRONE HYDROCHLORIDE 5 MG: 5 TABLET ORAL at 10:28

## 2023-11-20 RX ADMIN — Medication 10 ML: at 10:28

## 2023-11-20 RX ADMIN — MICONAZOLE NITRATE: 2 POWDER TOPICAL at 10:32

## 2023-11-20 RX ADMIN — ASPIRIN 81 MG 81 MG: 81 TABLET ORAL at 10:28

## 2023-11-20 ASSESSMENT — PAIN - FUNCTIONAL ASSESSMENT: PAIN_FUNCTIONAL_ASSESSMENT: PREVENTS OR INTERFERES WITH MANY ACTIVE NOT PASSIVE ACTIVITIES

## 2023-11-20 ASSESSMENT — PAIN DESCRIPTION - LOCATION: LOCATION: LEG

## 2023-11-20 ASSESSMENT — PAIN DESCRIPTION - DESCRIPTORS: DESCRIPTORS: ACHING

## 2023-11-20 ASSESSMENT — PAIN DESCRIPTION - ORIENTATION: ORIENTATION: LEFT;RIGHT

## 2023-11-20 ASSESSMENT — PAIN SCALES - GENERAL: PAINLEVEL_OUTOF10: 3

## 2023-11-20 NOTE — DISCHARGE SUMMARY
PROVIDED HISTORY: leg swelling TECHNOLOGIST PROVIDED HISTORY: Reason for exam:->leg swelling What reading provider will be dictating this exam?->CRC FINDINGS: The lungs are without acute focal process. There is no effusion or pneumothorax. The cardiomediastinal silhouette is without acute process. The osseous structures are without acute process. No acute process. Cardiomegaly. Discharge Medications:         Medication List        CONTINUE taking these medications      acetaminophen 325 MG tablet  Commonly known as: TYLENOL     aspirin 81 MG chewable tablet  Take 1 tablet by mouth daily     busPIRone 5 MG tablet  Commonly known as: BUSPAR     Eliquis 2.5 MG Tabs tablet  Generic drug: apixaban     ipratropium 0.5 mg-albuterol 2.5 mg 0.5-2.5 (3) MG/3ML Soln nebulizer solution  Commonly known as: DUONEB  Inhale 3 mLs into the lungs every 4 hours as needed for Shortness of Breath     metoprolol succinate 25 MG extended release tablet  Commonly known as: TOPROL XL  Take 1 tablet by mouth daily     nystatin [id number] UNIT/GM powder  Commonly known as: MYCOSTATIN     pravastatin 80 MG tablet  Commonly known as: Pravachol  Take 1 tablet by mouth every evening     torsemide 20 MG tablet  Commonly known as: DEMADEX  Take 1 tablet by mouth daily              Disposition:   If discharged to Home, [facility] [address] [facility] needs that were indicated and/or required as been addressed and set up by Social Work. Condition at discharge: good     Activity: activity as tolerated    Total time taken for discharging this patient: 40 minutes. Greater than 70% of time was spent focused exclusively on this patient. Time was taken to review chart, discuss plans with consultants, reconciling medications, discussing plan answering questions with patient.      Yuniel Sanabria DO  11/20/2023, 2:44 PM  ----------------------------------------------------------------------------------------------------------------------    Paige Cruz

## 2023-11-20 NOTE — CARE COORDINATION
This LSW visited patient at bedside this am. Patient is to be transferred to the [facility] today, 11/20/23. Patients family has agreed to be private pay at [facility] [address]. This LSW arranged transportation via [facility]. Transport is scheduled for 12:00 PM. Patients nataly Bell, and Brittani Carrasco are all notified.   Electronically signed by SHANNAN Franco, LSW on 11/20/23 at 10:15 AM EST

## 2023-11-20 NOTE — PLAN OF CARE
Problem: Discharge Planning  Goal: Discharge to home or other facility with appropriate resources  11/20/2023 1335 by Willy Max RN  Outcome: Completed  11/20/2023 1334 by Willy Max RN  Outcome: Adequate for Discharge  Flowsheets (Taken 11/20/2023 1151)  Discharge to home or other facility with appropriate resources:   Identify barriers to discharge with patient and caregiver   Arrange for needed discharge resources and transportation as appropriate     Problem: Chronic Conditions and Co-morbidities  Goal: Patient's chronic conditions and co-morbidity symptoms are monitored and maintained or improved  Recent Flowsheet Documentation  Taken 11/20/2023 1151 by Willy Max RN  Care Plan - Patient's Chronic Conditions and Co-Morbidity Symptoms are Monitored and Maintained or Improved:   Monitor and assess patient's chronic conditions and comorbid symptoms for stability, deterioration, or improvement   Collaborate with multidisciplinary team to address chronic and comorbid conditions and prevent exacerbation or deterioration

## 2023-11-20 NOTE — PLAN OF CARE
Problem: Discharge Planning  Goal: Discharge to home or other facility with appropriate resources  Outcome: Adequate for Discharge  Flowsheets (Taken 11/20/2023 1151)  Discharge to home or other facility with appropriate resources:   Identify barriers to discharge with patient and caregiver   Arrange for needed discharge resources and transportation as appropriate     Problem: Safety - Adult  Goal: Free from fall injury  Outcome: Adequate for Discharge     Problem: Pain  Goal: Verbalizes/displays adequate comfort level or baseline comfort level  Outcome: Adequate for Discharge     Problem: Chronic Conditions and Co-morbidities  Goal: Patient's chronic conditions and co-morbidity symptoms are monitored and maintained or improved  Recent Flowsheet Documentation  Taken 11/20/2023 1151 by [facility] - Patient's Chronic Conditions and Co-Morbidity Symptoms are Monitored and Maintained or Improved:   Monitor and assess patient's chronic conditions and comorbid symptoms for stability, deterioration, or improvement   Collaborate with multidisciplinary team to address chronic and comorbid conditions and prevent exacerbation or deterioration

## 2023-11-21 ENCOUNTER — OFFICE VISIT (OUTPATIENT)
Dept: GERIATRIC MEDICINE | Age: 88
End: 2023-11-21

## 2023-11-21 DIAGNOSIS — F03.90 DEMENTIA WITHOUT BEHAVIORAL DISTURBANCE (HCC): Primary | ICD-10-CM

## 2023-11-21 DIAGNOSIS — F32.A DEPRESSION, UNSPECIFIED DEPRESSION TYPE: ICD-10-CM

## 2023-11-21 DIAGNOSIS — I50.32 CHRONIC DIASTOLIC CHF (CONGESTIVE HEART FAILURE) (HCC): ICD-10-CM

## 2023-11-21 DIAGNOSIS — F41.9 ANXIETY: ICD-10-CM

## 2023-11-21 DIAGNOSIS — R62.7 FAILURE TO THRIVE IN ADULT: ICD-10-CM

## 2023-11-21 DIAGNOSIS — L89.152 SACRAL DECUBITUS ULCER, STAGE II (HCC): ICD-10-CM

## 2023-11-22 ENCOUNTER — OFFICE VISIT (OUTPATIENT)
Dept: GERIATRIC MEDICINE | Age: 88
End: 2023-11-22
Payer: MEDICARE

## 2023-11-22 DIAGNOSIS — R62.7 FAILURE TO THRIVE IN ADULT: ICD-10-CM

## 2023-11-22 DIAGNOSIS — F41.9 ANXIETY: ICD-10-CM

## 2023-11-22 DIAGNOSIS — F03.90 DEMENTIA WITHOUT BEHAVIORAL DISTURBANCE (HCC): Primary | ICD-10-CM

## 2023-11-22 DIAGNOSIS — I50.32 CHRONIC DIASTOLIC CHF (CONGESTIVE HEART FAILURE) (HCC): ICD-10-CM

## 2023-11-22 DIAGNOSIS — F32.A DEPRESSION, UNSPECIFIED DEPRESSION TYPE: ICD-10-CM

## 2023-11-22 DIAGNOSIS — L89.152 SACRAL DECUBITUS ULCER, STAGE II (HCC): ICD-10-CM

## 2023-11-22 PROCEDURE — 99308 SBSQ NF CARE LOW MDM 20: CPT | Performed by: INTERNAL MEDICINE

## 2023-11-22 PROCEDURE — 1123F ACP DISCUSS/DSCN MKR DOCD: CPT | Performed by: INTERNAL MEDICINE

## 2023-11-24 ENCOUNTER — OFFICE VISIT (OUTPATIENT)
Dept: GERIATRIC MEDICINE | Age: 88
End: 2023-11-24
Payer: MEDICARE

## 2023-11-24 DIAGNOSIS — F32.A DEPRESSION, UNSPECIFIED DEPRESSION TYPE: ICD-10-CM

## 2023-11-24 DIAGNOSIS — L89.152 SACRAL DECUBITUS ULCER, STAGE II (HCC): ICD-10-CM

## 2023-11-24 DIAGNOSIS — R62.7 FAILURE TO THRIVE IN ADULT: ICD-10-CM

## 2023-11-24 DIAGNOSIS — I50.32 CHRONIC DIASTOLIC CHF (CONGESTIVE HEART FAILURE) (HCC): ICD-10-CM

## 2023-11-24 DIAGNOSIS — F41.9 ANXIETY: ICD-10-CM

## 2023-11-24 DIAGNOSIS — F03.90 DEMENTIA WITHOUT BEHAVIORAL DISTURBANCE (HCC): Primary | ICD-10-CM

## 2023-11-24 PROCEDURE — 1123F ACP DISCUSS/DSCN MKR DOCD: CPT | Performed by: INTERNAL MEDICINE

## 2023-11-24 PROCEDURE — 99308 SBSQ NF CARE LOW MDM 20: CPT | Performed by: INTERNAL MEDICINE

## 2023-11-25 ENCOUNTER — OFFICE VISIT (OUTPATIENT)
Dept: GERIATRIC MEDICINE | Age: 88
End: 2023-11-25

## 2023-11-25 DIAGNOSIS — F03.90 DEMENTIA WITHOUT BEHAVIORAL DISTURBANCE (HCC): Primary | ICD-10-CM

## 2023-11-25 DIAGNOSIS — R62.7 FAILURE TO THRIVE IN ADULT: ICD-10-CM

## 2023-11-25 DIAGNOSIS — F32.A DEPRESSION, UNSPECIFIED DEPRESSION TYPE: ICD-10-CM

## 2023-11-25 DIAGNOSIS — L89.152 SACRAL DECUBITUS ULCER, STAGE II (HCC): ICD-10-CM

## 2023-11-25 DIAGNOSIS — F41.9 ANXIETY: ICD-10-CM

## 2023-11-25 DIAGNOSIS — I50.32 CHRONIC DIASTOLIC CHF (CONGESTIVE HEART FAILURE) (HCC): ICD-10-CM

## 2023-11-25 NOTE — PROGRESS NOTES
SNF PROGRESS NOTE      Cc- weakness      Patient is a Susan Hanley 80 y.o. male who is being seen at [facility]. The patient was being seen secondary to increased weakness and failure to thrive. He is laying in the chair at bedside. No complaints at this time. No SOB. Past Medical History:   Diagnosis Date    Bilateral lower extremity edema     Carotid stenosis     Dementia (HCC)     Diastolic CHF, acute on chronic (HCC)     mitral stenosis    FH: carotid endarterectomy     Hiatal hernia     Hypoxia     Loss of coordination     Muscle weakness     Pharyngeal dysphagia     Pneumonia      Morphine    VS reviewed    Gen- Alert and oriented x 1   Heart- RRR no murmur no LE edema   Lungs- CTA b/l no resp distress RA oxygen   Abd- bs x 4           Assessment and Plan    Dementia   Psych to see   Failure to thrive.    Stage 2 sacral decub  Wound care   HLD  Statin   Stage 2 sacral decub  CHF-- diastolic   Torsemide  Metoprolol  Eliquis     Depression/ anxiety   Buspar  Hydroxyzine  Psych to see         Linda Oro DO, [address]     Electronically signed by: Glenn Eller DO on 11/25/2023

## 2023-11-25 NOTE — PROGRESS NOTES
Depression/ anxiety   Buspar  Hydroxyzine  Psych to see             Shoshana Bach DO [facility]     Electronically signed 11/25/2023        NOTE: This report was transcribed using voice recognition software. Every effort was made to ensure accuracy; however, inadvertent computerized transcription errors may be present.

## 2023-11-28 ENCOUNTER — OFFICE VISIT (OUTPATIENT)
Dept: GERIATRIC MEDICINE | Age: 88
End: 2023-11-28
Payer: MEDICARE

## 2023-11-28 DIAGNOSIS — R62.7 FAILURE TO THRIVE IN ADULT: ICD-10-CM

## 2023-11-28 DIAGNOSIS — M25.551 RIGHT HIP PAIN: ICD-10-CM

## 2023-11-28 DIAGNOSIS — I50.32 CHRONIC DIASTOLIC CHF (CONGESTIVE HEART FAILURE) (HCC): ICD-10-CM

## 2023-11-28 DIAGNOSIS — F41.9 ANXIETY: ICD-10-CM

## 2023-11-28 DIAGNOSIS — L89.152 SACRAL DECUBITUS ULCER, STAGE II (HCC): ICD-10-CM

## 2023-11-28 DIAGNOSIS — F03.90 DEMENTIA WITHOUT BEHAVIORAL DISTURBANCE (HCC): Primary | ICD-10-CM

## 2023-11-28 DIAGNOSIS — F32.A DEPRESSION, UNSPECIFIED DEPRESSION TYPE: ICD-10-CM

## 2023-11-28 PROCEDURE — 1123F ACP DISCUSS/DSCN MKR DOCD: CPT | Performed by: INTERNAL MEDICINE

## 2023-11-28 PROCEDURE — 99309 SBSQ NF CARE MODERATE MDM 30: CPT | Performed by: INTERNAL MEDICINE

## 2023-11-28 NOTE — PROGRESS NOTES
SNF PROGRESS NOTE      Cc- weakness      Patient is a [place]  80 y.o. male who is being seen at [facility]. The patient was being seen secondary to increased weakness and failure to thrive. Patient to be seen by wound care. Also seen by psych. Minnie added onboard. Past Medical History:   Diagnosis Date    Bilateral lower extremity edema     Carotid stenosis     Dementia (HCC)     Diastolic CHF, acute on chronic (HCC)     mitral stenosis    FH: carotid endarterectomy     Hiatal hernia     Hypoxia     Loss of coordination     Muscle weakness     Pharyngeal dysphagia     Pneumonia      Morphine    VS reviewed      Gen- Alert and oriented x 1   Heart- RRR no murmur no LE edema   Lungs- CTA b/l no resp distress RA oxygen   Abd- bs x 4         Assessment and Plan      Dementia   Psych to see   Failure to thrive.    Stage 2 sacral decub  Wound care   HLD  Statin   Stage 2 sacral decub  CHF-- diastolic   Torsemide  Metoprolol  Eliquis     Depression/ anxiety   Buspar changed to TID   Hydroxyzine  Psych following        Beth Pappas DO, [address]     Electronically signed by: Pb Alonso DO on 11/22/2023

## 2023-11-29 ENCOUNTER — OFFICE VISIT (OUTPATIENT)
Dept: GERIATRIC MEDICINE | Age: 88
End: 2023-11-29
Payer: MEDICARE

## 2023-11-29 DIAGNOSIS — L89.152 SACRAL DECUBITUS ULCER, STAGE II (HCC): ICD-10-CM

## 2023-11-29 DIAGNOSIS — I50.32 CHRONIC DIASTOLIC CHF (CONGESTIVE HEART FAILURE) (HCC): ICD-10-CM

## 2023-11-29 DIAGNOSIS — F03.90 DEMENTIA WITHOUT BEHAVIORAL DISTURBANCE (HCC): Primary | ICD-10-CM

## 2023-11-29 DIAGNOSIS — R62.7 FAILURE TO THRIVE IN ADULT: ICD-10-CM

## 2023-11-29 DIAGNOSIS — F32.A DEPRESSION, UNSPECIFIED DEPRESSION TYPE: ICD-10-CM

## 2023-11-29 DIAGNOSIS — F41.9 ANXIETY: ICD-10-CM

## 2023-11-29 PROCEDURE — 1123F ACP DISCUSS/DSCN MKR DOCD: CPT | Performed by: INTERNAL MEDICINE

## 2023-11-29 PROCEDURE — 99309 SBSQ NF CARE MODERATE MDM 30: CPT | Performed by: INTERNAL MEDICINE

## 2023-11-29 NOTE — PROGRESS NOTES
SNF PROGRESS NOTE      Cc- weakness      Patient is a Apolonia Thao 80 y.o. male who is being seen at [facility]. The patient was being seen secondary to increased weakness and failure to thrive. Patient is laying in his med. He remains confused and agitated. HE does eat well. Past Medical History:   Diagnosis Date    Bilateral lower extremity edema     Carotid stenosis     Dementia (HCC)     Diastolic CHF, acute on chronic (HCC)     mitral stenosis    FH: carotid endarterectomy     Hiatal hernia     Hypoxia     Loss of coordination     Muscle weakness     Pharyngeal dysphagia     Pneumonia      Morphine    VS reviewed      Gen- Alert and oriented x 1   Heart- RRR no murmur no LE edema   Lungs- CTA b/l no resp distress RA oxygen   Abd- bs x 4        Assessment and Plan    Dementia   Psych to see   Hydroxyzine added on  Failure to thrive.    Stage 2 sacral decub  Wound care   HLD  Statin   Stage 2 sacral decub  CHF-- diastolic   Torsemide  Metoprolol  Eliquis     Depression/ anxiety   Buspar  Hydroxyzine  Psych to see      Russell Castro DO, [address]     Electronically signed by: Keaton Robles DO on 11/24/2023

## 2023-11-29 NOTE — PROGRESS NOTES
SNF PROGRESS NOTE      Cc- weakness      Patient is a Manjeet Wood 80 y.o. male who is being seen at [facility]. The patient was being seen secondary to increased weakness and failure to thrive. Patient is laying in his bed and watching TV. Nurse tells me that he has been agitated and doesn't seen that the buspar has helped that psych started him on. Past Medical History:   Diagnosis Date    Bilateral lower extremity edema     Carotid stenosis     Dementia (HCC)     Diastolic CHF, acute on chronic (HCC)     mitral stenosis    FH: carotid endarterectomy     Hiatal hernia     Hypoxia     Loss of coordination     Muscle weakness     Pharyngeal dysphagia     Pneumonia      Morphine    VS reviewed    Gen- Alert and oriented x 1   Heart- RRR no murmur no LE edema   Lungs- CTA b/l no resp distress RA oxygen   Abd- bs x 4        Assessment and Plan    Dementia   Psych to see   Failure to thrive.    Stage 2 sacral decub  Wound care   HLD  Statin   Stage 2 sacral decub  CHF-- diastolic   Torsemide  Metoprolol  Eliquis     Depression/ anxiety   Added on Ativan TID scheduled   Hydroxyzine       Shoshana Bach DO, [address]     Electronically signed by: Teodoro Dickson DO on 11/29/2023

## 2023-11-30 ENCOUNTER — OFFICE VISIT (OUTPATIENT)
Dept: GERIATRIC MEDICINE | Age: 88
End: 2023-11-30

## 2023-11-30 DIAGNOSIS — F41.9 ANXIETY: ICD-10-CM

## 2023-11-30 DIAGNOSIS — R62.7 FAILURE TO THRIVE IN ADULT: ICD-10-CM

## 2023-11-30 DIAGNOSIS — F32.A DEPRESSION, UNSPECIFIED DEPRESSION TYPE: ICD-10-CM

## 2023-11-30 DIAGNOSIS — L89.152 SACRAL DECUBITUS ULCER, STAGE II (HCC): ICD-10-CM

## 2023-11-30 DIAGNOSIS — F03.90 DEMENTIA WITHOUT BEHAVIORAL DISTURBANCE (HCC): Primary | ICD-10-CM

## 2023-11-30 DIAGNOSIS — I50.32 CHRONIC DIASTOLIC CHF (CONGESTIVE HEART FAILURE) (HCC): ICD-10-CM

## 2023-11-30 NOTE — PROGRESS NOTES
SNF PROGRESS NOTE      Cc- weakness      Patient is a Kavon Dilling 80 y.o. male who is being seen at [facility] for dementia. The patient was being seen secondary to increased weakness and failure to thrive. Patient remains agitated and seems to be worse at night. Psych NP added buspar on. The patient is complaining of right hip pain. Past Medical History:   Diagnosis Date    Bilateral lower extremity edema     Carotid stenosis     Dementia (HCC)     Diastolic CHF, acute on chronic (HCC)     mitral stenosis    FH: carotid endarterectomy     Hiatal hernia     Hypoxia     Loss of coordination     Muscle weakness     Pharyngeal dysphagia     Pneumonia      Morphine    VS reviewed    Gen- Alert and oriented x 1   Heart- RRR no murmur no LE edema   Lungs- CTA b/l no resp distress RA oxygen   Abd- bs x 4        Assessment and Plan    Dementia   Psych to see   Failure to thrive. Stage 2 sacral decub  Wound care   HLD  Statin   Stage 2 sacral decub  Right hip pain   Xray right hip.    CHF-- diastolic   Torsemide  Metoprolol  Eliquis     Depression/ anxiety   Added on Ativan TID scheduled   Hydroxyzine          Miller Wilde DO, [address]     Electronically signed by: Rachelle Cisneros DO on 11/28/2023

## 2023-12-01 ENCOUNTER — OFFICE VISIT (OUTPATIENT)
Dept: GERIATRIC MEDICINE | Age: 88
End: 2023-12-01

## 2023-12-01 DIAGNOSIS — L89.152 SACRAL DECUBITUS ULCER, STAGE II (HCC): ICD-10-CM

## 2023-12-01 DIAGNOSIS — F32.A DEPRESSION, UNSPECIFIED DEPRESSION TYPE: ICD-10-CM

## 2023-12-01 DIAGNOSIS — F41.9 ANXIETY: ICD-10-CM

## 2023-12-01 DIAGNOSIS — I50.32 CHRONIC DIASTOLIC CHF (CONGESTIVE HEART FAILURE) (HCC): ICD-10-CM

## 2023-12-01 DIAGNOSIS — R62.7 FAILURE TO THRIVE IN ADULT: ICD-10-CM

## 2023-12-01 DIAGNOSIS — F03.90 DEMENTIA WITHOUT BEHAVIORAL DISTURBANCE (HCC): Primary | ICD-10-CM

## 2023-12-07 NOTE — PROGRESS NOTES
Mouth: Mucous membranes are moist.      Pharynx: Oropharynx is clear. Eyes:      Conjunctiva/sclera: Conjunctivae normal.   Cardiovascular:      Rate and Rhythm: Normal rate and regular rhythm. Heart sounds: Murmur heard. Pulmonary:      Effort: Pulmonary effort is normal. No respiratory distress. Breath sounds: Wheezing (Bilateral mid to upper lungs) present. Comments: Mild bibasilar crackles, chronic finding  Musculoskeletal:         General: No tenderness, deformity or signs of injury. Cervical back: Normal range of motion and neck supple. No tenderness. Right lower leg: Edema present. Left lower leg: Edema present. Skin:     General: Skin is warm and dry. Findings: No erythema, lesion or rash. Neurological:      Mental Status: He is alert and oriented to person, place, and time. Mental status is at baseline. Motor: Weakness present. Gait: Gait abnormal.   Psychiatric:         Attention and Perception: Attention normal.         Mood and Affect: Mood is depressed. Affect is blunt and flat. Speech: Speech is slurred and tangential.         Behavior: Behavior is not agitated. Behavior is cooperative. Thought Content: Thought content does not include suicidal plan. Cognition and Memory: Memory is impaired. He exhibits impaired recent memory. Comments: Alert/oriented x1-2/3         Assessment:       Diagnosis Orders   1. COVID-19 virus infection        2. Chronic diastolic heart failure ([address])        3. History of dysphagia              Plan:      No orders of the defined types were placed in this encounter. No orders of the defined types were placed in this encounter. Continue monitoring labs. Emphasized fluid intake as it appears patient not taking in adequate fluids per renal function test.  Needs frequent reminding. Did order incentive spirometry to be used ad libitum. No follow-ups on file.       Elenita Thomas

## 2023-12-08 ENCOUNTER — OFFICE VISIT (OUTPATIENT)
Dept: GERIATRIC MEDICINE | Age: 88
End: 2023-12-08

## 2023-12-08 DIAGNOSIS — F41.9 ANXIETY: ICD-10-CM

## 2023-12-08 DIAGNOSIS — F03.90 DEMENTIA WITHOUT BEHAVIORAL DISTURBANCE (HCC): Primary | ICD-10-CM

## 2023-12-08 DIAGNOSIS — I50.32 CHRONIC DIASTOLIC CHF (CONGESTIVE HEART FAILURE) (HCC): ICD-10-CM

## 2023-12-08 DIAGNOSIS — F32.A DEPRESSION, UNSPECIFIED DEPRESSION TYPE: ICD-10-CM

## 2023-12-08 DIAGNOSIS — L89.152 SACRAL DECUBITUS ULCER, STAGE II (HCC): ICD-10-CM

## 2023-12-08 DIAGNOSIS — R62.7 FAILURE TO THRIVE IN ADULT: ICD-10-CM

## 2023-12-11 ENCOUNTER — OFFICE VISIT (OUTPATIENT)
Dept: GERIATRIC MEDICINE | Age: 88
End: 2023-12-11

## 2023-12-11 ENCOUNTER — LAB REQUISITION (OUTPATIENT)
Dept: LAB | Facility: HOSPITAL | Age: 88
End: 2023-12-11
Payer: MEDICARE

## 2023-12-11 DIAGNOSIS — R62.7 FAILURE TO THRIVE IN ADULT: ICD-10-CM

## 2023-12-11 DIAGNOSIS — F41.9 ANXIETY: ICD-10-CM

## 2023-12-11 DIAGNOSIS — L89.152 SACRAL DECUBITUS ULCER, STAGE II (HCC): ICD-10-CM

## 2023-12-11 DIAGNOSIS — R53.1 WEAKNESS: ICD-10-CM

## 2023-12-11 DIAGNOSIS — I50.32 CHRONIC DIASTOLIC CHF (CONGESTIVE HEART FAILURE) (HCC): ICD-10-CM

## 2023-12-11 DIAGNOSIS — F03.90 DEMENTIA WITHOUT BEHAVIORAL DISTURBANCE (HCC): Primary | ICD-10-CM

## 2023-12-11 DIAGNOSIS — F32.A DEPRESSION, UNSPECIFIED DEPRESSION TYPE: ICD-10-CM

## 2023-12-11 LAB
ANION GAP SERPL CALC-SCNC: 12 MMOL/L (ref 10–20)
BASOPHILS # BLD AUTO: 0.08 X10*3/UL (ref 0–0.1)
BASOPHILS NFR BLD AUTO: 0.9 %
BUN SERPL-MCNC: 18 MG/DL (ref 6–23)
CALCIUM SERPL-MCNC: 8.3 MG/DL (ref 8.6–10.3)
CHLORIDE SERPL-SCNC: 101 MMOL/L (ref 98–107)
CO2 SERPL-SCNC: 35 MMOL/L (ref 21–32)
CREAT SERPL-MCNC: 1.1 MG/DL (ref 0.5–1.3)
EOSINOPHIL # BLD AUTO: 0.51 X10*3/UL (ref 0–0.4)
EOSINOPHIL NFR BLD AUTO: 5.6 %
ERYTHROCYTE [DISTWIDTH] IN BLOOD BY AUTOMATED COUNT: 13.5 % (ref 11.5–14.5)
GFR SERPL CREATININE-BSD FRML MDRD: 63 ML/MIN/1.73M*2
GLUCOSE SERPL-MCNC: 163 MG/DL (ref 74–99)
HCT VFR BLD AUTO: 41.5 % (ref 41–52)
HGB BLD-MCNC: 13.4 G/DL (ref 13.5–17.5)
IMM GRANULOCYTES # BLD AUTO: 0.04 X10*3/UL (ref 0–0.5)
IMM GRANULOCYTES NFR BLD AUTO: 0.4 % (ref 0–0.9)
LYMPHOCYTES # BLD AUTO: 2.18 X10*3/UL (ref 0.8–3)
LYMPHOCYTES NFR BLD AUTO: 23.8 %
MCH RBC QN AUTO: 30.2 PG (ref 26–34)
MCHC RBC AUTO-ENTMCNC: 32.3 G/DL (ref 32–36)
MCV RBC AUTO: 94 FL (ref 80–100)
MONOCYTES # BLD AUTO: 0.66 X10*3/UL (ref 0.05–0.8)
MONOCYTES NFR BLD AUTO: 7.2 %
NEUTROPHILS # BLD AUTO: 5.68 X10*3/UL (ref 1.6–5.5)
NEUTROPHILS NFR BLD AUTO: 62.1 %
NRBC BLD-RTO: 0 /100 WBCS (ref 0–0)
PLATELET # BLD AUTO: 270 X10*3/UL (ref 150–450)
POTASSIUM SERPL-SCNC: 3.2 MMOL/L (ref 3.5–5.3)
RBC # BLD AUTO: 4.44 X10*6/UL (ref 4.5–5.9)
SODIUM SERPL-SCNC: 145 MMOL/L (ref 136–145)
WBC # BLD AUTO: 9.2 X10*3/UL (ref 4.4–11.3)

## 2023-12-11 PROCEDURE — 85025 COMPLETE CBC W/AUTO DIFF WBC: CPT

## 2023-12-11 PROCEDURE — 80048 BASIC METABOLIC PNL TOTAL CA: CPT

## 2023-12-12 ENCOUNTER — OFFICE VISIT (OUTPATIENT)
Dept: GERIATRIC MEDICINE | Age: 88
End: 2023-12-12

## 2023-12-12 DIAGNOSIS — R62.7 FAILURE TO THRIVE IN ADULT: ICD-10-CM

## 2023-12-12 DIAGNOSIS — F32.A DEPRESSION, UNSPECIFIED DEPRESSION TYPE: ICD-10-CM

## 2023-12-12 DIAGNOSIS — F03.90 DEMENTIA WITHOUT BEHAVIORAL DISTURBANCE (HCC): Primary | ICD-10-CM

## 2023-12-12 DIAGNOSIS — L89.152 SACRAL DECUBITUS ULCER, STAGE II (HCC): ICD-10-CM

## 2023-12-12 DIAGNOSIS — I50.32 CHRONIC DIASTOLIC CHF (CONGESTIVE HEART FAILURE) (HCC): ICD-10-CM

## 2023-12-15 NOTE — PROGRESS NOTES
Activity: As tolerated   SNF PROGRESS NOTE      Cc- weakness      Patient is a Rafita Baird 80 y.o. male who is being seen at [facility]. The patient was being seen secondary to increased weakness and failure to thrive. Patient is laying in bed. Psych saw patient, and recommended vistaril. Patient is eating well. Past Medical History:   Diagnosis Date    Bilateral lower extremity edema     Carotid stenosis     Dementia (HCC)     Diastolic CHF, acute on chronic (HCC)     mitral stenosis    FH: carotid endarterectomy     Hiatal hernia     Hypoxia     Loss of coordination     Muscle weakness     Pharyngeal dysphagia     Pneumonia      Morphine    VS reviewed    Gen- Alert and oriented x 1   Heart- RRR no murmur no LE edema   Lungs- CTA b/l no resp distress RA oxygen   Abd- bs x 4        Assessment and Plan      Dementia   Psych to see   Failure to thrive.    Stage 2 sacral decub  Wound care   HLD  Statin   Stage 2 sacral decub  CHF-- diastolic   Torsemide  Metoprolol  Eliquis     Depression/ anxiety   Ativan TID scheduled   Hydroxyzine     Heaven Hull DO, [address]     Electronically signed by: Crow Long DO on 12/8/2023

## 2023-12-16 NOTE — PROGRESS NOTES
SNF PROGRESS NOTE      Cc- weakness      Patient is a Rafita American 80 y.o. male who is being seen at [facility]. The patient was being seen secondary to increased weakness and failure to thrive. Patient is sitting up in bed.  He is a little more  con        Past Medical History:   Diagnosis Date    Bilateral lower extremity edema     Carotid stenosis     Dementia (HCC)     Diastolic CHF, acute on chronic (HCC)     mitral stenosis    FH: carotid endarterectomy     Hiatal hernia     Hypoxia     Loss of coordination     Muscle weakness     Pharyngeal dysphagia     Pneumonia      Morphine    VS reviewed    Gen- Alert and oriented   Heart- RRR no murmur no LE edema   Lungs- CTA b/l no resp distress **** oxygen   Abd- bs x 4           Assessment and [address] [facility]     Electronically signed by: Crow Long DO on 12/11/2023

## 2023-12-16 NOTE — PROGRESS NOTES
SNF PROGRESS NOTE      Cc- weakness/ dementia       Patient is a Carlos Enrique Hora 80 y.o. male who is being seen at [facility]. The patient was being seen secondary to increased weakness and failure to thrive. Patient is a little more confused than normal. He is sitting in his room. Past Medical History:   Diagnosis Date    Bilateral lower extremity edema     Carotid stenosis     Dementia (HCC)     Diastolic CHF, acute on chronic (HCC)     mitral stenosis    FH: carotid endarterectomy     Hiatal hernia     Hypoxia     Loss of coordination     Muscle weakness     Pharyngeal dysphagia     Pneumonia      Morphine    VS reviewed    Gen- Alert and oriented x 1   Heart- RRR no murmur no LE edema   Lungs- CTA b/l no resp distress RA oxygen   Abd- bs x 4          Assessment and Plan    Dementia   Psych to see   Failure to thrive.    Stage 2 sacral decub  Wound care   HLD  Statin   Stage 2 sacral decub  CHF-- diastolic   Torsemide  Metoprolol  Eliquis     Depression/ anxiety   Ativan TID scheduled   Hydroxyzine      Courtney Gray DO, [address]     Electronically signed by: Katya Johnson DO on 12/11/2023

## 2024-01-01 ENCOUNTER — OFFICE VISIT (OUTPATIENT)
Dept: GERIATRIC MEDICINE | Age: 89
End: 2024-01-01
Payer: MEDICARE

## 2024-01-01 DIAGNOSIS — R53.1 GENERALIZED WEAKNESS: ICD-10-CM

## 2024-01-01 DIAGNOSIS — Z71.89 GOALS OF CARE, COUNSELING/DISCUSSION: ICD-10-CM

## 2024-01-01 DIAGNOSIS — Z87.01 HX OF BACTERIAL PNEUMONIA: ICD-10-CM

## 2024-01-01 DIAGNOSIS — R41.82 ACUTE ALTERATION IN MENTAL STATUS: ICD-10-CM

## 2024-01-01 DIAGNOSIS — Z51.5 HOSPICE CARE: ICD-10-CM

## 2024-01-01 DIAGNOSIS — I50.32 CHRONIC DIASTOLIC HEART FAILURE (HCC): Primary | ICD-10-CM

## 2024-01-01 PROCEDURE — 99349 HOME/RES VST EST MOD MDM 40: CPT | Performed by: PHYSICIAN ASSISTANT

## 2024-01-01 PROCEDURE — G9692 HOSP RECD BY PT DUR MSMT PER: HCPCS | Performed by: PHYSICIAN ASSISTANT

## 2024-01-02 ENCOUNTER — OFFICE VISIT (OUTPATIENT)
Dept: GERIATRIC MEDICINE | Age: 89
End: 2024-01-02
Payer: MEDICARE

## 2024-01-02 DIAGNOSIS — R62.7 FAILURE TO THRIVE IN ADULT: ICD-10-CM

## 2024-01-02 DIAGNOSIS — F03.90 DEMENTIA WITHOUT BEHAVIORAL DISTURBANCE (HCC): Primary | ICD-10-CM

## 2024-01-02 DIAGNOSIS — F32.A DEPRESSION, UNSPECIFIED DEPRESSION TYPE: ICD-10-CM

## 2024-01-02 DIAGNOSIS — I50.32 CHRONIC DIASTOLIC CHF (CONGESTIVE HEART FAILURE) (HCC): ICD-10-CM

## 2024-01-02 DIAGNOSIS — F41.9 ANXIETY: ICD-10-CM

## 2024-01-02 DIAGNOSIS — L89.152 SACRAL DECUBITUS ULCER, STAGE II (HCC): ICD-10-CM

## 2024-01-02 PROCEDURE — 99316 NF DSCHRG MGMT 30 MIN+: CPT | Performed by: INTERNAL MEDICINE

## 2024-01-02 NOTE — PROGRESS NOTES
Discharge Summary       Discharge Disposition AL     Discharge Condition - stable       Discharge time 35 min       Medications   Current Outpatient Medications   Medication Sig Dispense Refill    busPIRone (BUSPAR) 5 MG tablet Take 1 tablet by mouth 2 times daily      apixaban (ELIQUIS) 2.5 MG TABS tablet Take 1 tablet by mouth 2 times daily      nystatin (MYCOSTATIN) 698935 UNIT/GM powder Apply topically 4 times daily Apply topically 4 times daily.      acetaminophen (TYLENOL) 325 MG tablet Take 2 tablets by mouth every 6 hours as needed for Pain or Fever      pravastatin (PRAVACHOL) 80 MG tablet Take 1 tablet by mouth every evening 30 tablet 2    aspirin 81 MG chewable tablet Take 1 tablet by mouth daily (Patient taking differently: Take 1 tablet by mouth daily Indications: Preventative Treatment) 30 tablet 3    ipratropium-albuterol (DUONEB) 0.5-2.5 (3) MG/3ML SOLN nebulizer solution Inhale 3 mLs into the lungs every 4 hours as needed for Shortness of Breath 360 mL 1    metoprolol succinate (TOPROL XL) 25 MG extended release tablet Take 1 tablet by mouth daily (Patient taking differently: Take 1 tablet by mouth daily Indications: High Blood Pressure Disorder) 30 tablet 3    torsemide (DEMADEX) 20 MG tablet Take 1 tablet by mouth daily (Patient taking differently: Take 1 tablet by mouth daily Indications: Edema) 30 tablet 3     No current facility-administered medications for this visit.       Diet- cardiac     Activity as tolerated         Brief SNF Course--     This is an 89 y.o. male who is being seen at [facility]. Psych followed him and he was placed on ativan scheduled.           Discharge Diagnosis :    Dementia   Failure to thrive.   Stage 2 sacral decub/ wound heel  HLD  CHF-- diastolic   Depression/ anxiety         Follow up --     PCP in 1-2 weeks.         Adriana Lopes DO FACMIGUE     Electronically signed by: Adriana Lopes DO on

## 2024-01-04 ENCOUNTER — OFFICE VISIT (OUTPATIENT)
Dept: GERIATRIC MEDICINE | Age: 89
End: 2024-01-04
Payer: MEDICARE

## 2024-01-04 DIAGNOSIS — F03.C0 SEVERE DEMENTIA WITHOUT BEHAVIORAL DISTURBANCE, PSYCHOTIC DISTURBANCE, MOOD DISTURBANCE, OR ANXIETY, UNSPECIFIED DEMENTIA TYPE (HCC): Primary | ICD-10-CM

## 2024-01-04 DIAGNOSIS — L89.309 PRESSURE INJURY OF SKIN OF BUTTOCK, UNSPECIFIED INJURY STAGE, UNSPECIFIED LATERALITY: ICD-10-CM

## 2024-01-04 DIAGNOSIS — L97.409 ULCER OF HEEL, UNSPECIFIED LATERALITY, UNSPECIFIED ULCER STAGE (HCC): ICD-10-CM

## 2024-01-04 DIAGNOSIS — I50.32 CHRONIC DIASTOLIC HEART FAILURE (HCC): ICD-10-CM

## 2024-01-04 DIAGNOSIS — R53.1 GENERALIZED WEAKNESS: ICD-10-CM

## 2024-01-04 PROCEDURE — 99350 HOME/RES VST EST HIGH MDM 60: CPT | Performed by: PHYSICIAN ASSISTANT

## 2024-01-04 PROCEDURE — 1123F ACP DISCUSS/DSCN MKR DOCD: CPT | Performed by: PHYSICIAN ASSISTANT

## 2024-01-08 DIAGNOSIS — F03.94 ANXIETY DUE TO DEMENTIA (HCC): Primary | ICD-10-CM

## 2024-01-08 RX ORDER — LORAZEPAM 0.5 MG/1
0.5 TABLET ORAL EVERY 8 HOURS PRN
Qty: 90 TABLET | Refills: 0 | Status: SHIPPED | OUTPATIENT
Start: 2024-01-08 | End: 2024-02-07

## 2024-01-09 ENCOUNTER — OFFICE VISIT (OUTPATIENT)
Dept: GERIATRIC MEDICINE | Age: 89
End: 2024-01-09
Payer: MEDICARE

## 2024-01-09 DIAGNOSIS — F03.C0 SEVERE DEMENTIA WITHOUT BEHAVIORAL DISTURBANCE, PSYCHOTIC DISTURBANCE, MOOD DISTURBANCE, OR ANXIETY, UNSPECIFIED DEMENTIA TYPE (HCC): Primary | ICD-10-CM

## 2024-01-09 DIAGNOSIS — R63.4 WEIGHT LOSS, NON-INTENTIONAL: ICD-10-CM

## 2024-01-09 DIAGNOSIS — E46 PROTEIN-CALORIE MALNUTRITION, UNSPECIFIED SEVERITY (HCC): ICD-10-CM

## 2024-01-09 DIAGNOSIS — R62.7 FAILURE TO THRIVE IN ADULT: ICD-10-CM

## 2024-01-09 PROCEDURE — 99349 HOME/RES VST EST MOD MDM 40: CPT | Performed by: PHYSICIAN ASSISTANT

## 2024-01-09 PROCEDURE — 1123F ACP DISCUSS/DSCN MKR DOCD: CPT | Performed by: PHYSICIAN ASSISTANT

## 2024-01-10 LAB
ANION GAP SERPL CALCULATED.3IONS-SCNC: 12 MEQ/L (ref 9–15)
BUN SERPL-MCNC: 14 MG/DL (ref 8–23)
CALCIUM SERPL-MCNC: 8.3 MG/DL (ref 8.5–9.9)
CHLORIDE SERPL-SCNC: 97 MEQ/L (ref 95–107)
CO2 SERPL-SCNC: 32 MEQ/L (ref 20–31)
CREAT SERPL-MCNC: 0.8 MG/DL (ref 0.7–1.2)
ERYTHROCYTE [DISTWIDTH] IN BLOOD BY AUTOMATED COUNT: 13.9 % (ref 11.5–14.5)
GLUCOSE SERPL-MCNC: 130 MG/DL (ref 70–99)
HCT VFR BLD AUTO: 35.1 % (ref 42–52)
HGB BLD-MCNC: 11.6 G/DL (ref 14–18)
MCH RBC QN AUTO: 30.8 PG (ref 27–31.3)
MCHC RBC AUTO-ENTMCNC: 33 % (ref 33–37)
MCV RBC AUTO: 93.1 FL (ref 79–92.2)
PLATELET # BLD AUTO: 254 K/UL (ref 130–400)
POTASSIUM SERPL-SCNC: 3.1 MEQ/L (ref 3.4–4.9)
PREALB SERPL-MCNC: 10.1 MG/DL (ref 20–40)
RBC # BLD AUTO: 3.77 M/UL (ref 4.7–6.1)
SODIUM SERPL-SCNC: 141 MEQ/L (ref 135–144)
WBC # BLD AUTO: 9.6 K/UL (ref 4.8–10.8)

## 2024-01-15 LAB
ANION GAP SERPL CALCULATED.3IONS-SCNC: 12 MEQ/L (ref 9–15)
BUN SERPL-MCNC: 18 MG/DL (ref 8–23)
CALCIUM SERPL-MCNC: 7.9 MG/DL (ref 8.5–9.9)
CHLORIDE SERPL-SCNC: 96 MEQ/L (ref 95–107)
CO2 SERPL-SCNC: 33 MEQ/L (ref 20–31)
CREAT SERPL-MCNC: 0.81 MG/DL (ref 0.7–1.2)
GLUCOSE SERPL-MCNC: 148 MG/DL (ref 70–99)
POTASSIUM SERPL-SCNC: 3.6 MEQ/L (ref 3.4–4.9)
SODIUM SERPL-SCNC: 141 MEQ/L (ref 135–144)

## 2024-01-16 ENCOUNTER — OFFICE VISIT (OUTPATIENT)
Dept: GERIATRIC MEDICINE | Age: 89
End: 2024-01-16
Payer: MEDICARE

## 2024-01-16 DIAGNOSIS — G30.1 SEVERE LATE ONSET ALZHEIMER'S DEMENTIA WITH AGITATION (HCC): Primary | ICD-10-CM

## 2024-01-16 DIAGNOSIS — Z51.5 HOSPICE CARE: ICD-10-CM

## 2024-01-16 DIAGNOSIS — F02.C11 SEVERE LATE ONSET ALZHEIMER'S DEMENTIA WITH AGITATION (HCC): Primary | ICD-10-CM

## 2024-01-16 PROCEDURE — G9692 HOSP RECD BY PT DUR MSMT PER: HCPCS | Performed by: PHYSICIAN ASSISTANT

## 2024-01-16 PROCEDURE — 99348 HOME/RES VST EST LOW MDM 30: CPT | Performed by: PHYSICIAN ASSISTANT

## 2024-01-23 DIAGNOSIS — F03.94 ANXIETY DUE TO DEMENTIA (HCC): ICD-10-CM

## 2024-01-23 RX ORDER — LORAZEPAM 0.5 MG/1
0.5 TABLET ORAL EVERY 8 HOURS PRN
Qty: 90 TABLET | Refills: 0 | Status: SHIPPED | OUTPATIENT
Start: 2024-01-23 | End: 2024-02-22

## 2024-01-24 LAB — PREALB SERPL-MCNC: 7 MG/DL (ref 20–40)

## 2024-01-29 NOTE — PROGRESS NOTES
Subjective:      Patient ID: Navarro Rice is a pleasant 89 y.o. male who presents today for:  No chief complaint on file.      [facility]  Patient re-admitted to AL in memory care unit after undergoing recovery in SNF for progressing dementia, FTT, stage 2 decubitus ulcer to heels, HLD, CHF, and depression/anxiety. Pt is stable on admission. VSS. In addition to bilateral heels, has new buttocks pressure injury. Will be ordering [facility] with admission. Pt has very poor recall, worsening cognition compared to prior visits. Very weak, but is able to help with transfer to very limited degree. He is very unstable on his own, and requires assistance for all ADL's currently. Plan to f/u with new labs and PT/oT eval.      Patient Active Problem List   Diagnosis    Community acquired pneumonia due to Mycoplasma pneumoniae    Claudication of gluteal region (HCC)    Dyslipidemia    Mitral valve insufficiency    Nonrheumatic aortic valve stenosis    Stenosis of carotid artery    Sepsis (HCC)    Dementia (HCC)    Acute alteration in mental status    Bacteremia    Asteatosis cutis    Cholelithiasis with acute cholecystitis without obstruction    Clubbed toes    Diastolic heart failure (HCC)    Edema    Glucose intolerance (impaired glucose tolerance)    Hearing loss    Hyperlipemia    Scoliosis, thoracogenic    Spondylosis of thoracic region without myelopathy or radiculopathy    Vascular insufficiency    Vitamin D deficiency    Pneumonia due to infectious organism    Palliative care encounter    Goals of care, counseling/discussion    Advanced care planning/counseling discussion    Generalized weakness     Past Medical History:   Diagnosis Date    Bilateral lower extremity edema     Carotid stenosis     Dementia (HCC)     Diastolic CHF, acute on chronic (HCC)     mitral stenosis    FH: carotid endarterectomy     Hiatal hernia     Hypoxia     Loss of coordination     Muscle weakness     Pharyngeal dysphagia

## 2024-01-30 ENCOUNTER — TELEPHONE (OUTPATIENT)
Dept: CARDIOLOGY | Facility: CLINIC | Age: 89
End: 2024-01-30
Payer: MEDICARE

## 2024-01-30 NOTE — TELEPHONE ENCOUNTER
Spoke with Effie sol nurse from [facility]. stating patient was admitted to them and the attending physician needs a copy of the Echo done 1/20/23.  Report faxed to [phone].  Nelly Mcdowell, CMA

## 2024-01-30 NOTE — PROGRESS NOTES
Subjective:      Patient ID: Navarro Rice is a pleasant 89 y.o. male who presents today for:  No chief complaint on file.      [facility]    Patient seen for follow-up of anxiety/dementia as well as ongoing progressive weakness.  Patient has recently returned from [facility] where he underwent PT/OT.  Will reorder PT OT eval along with home health care services.  Patient does continue to show poor intake of food and fluids.  Will continue monitoring weights.  Per nurse Ativan is moderately effective, filled yesterday.  Overall patient is showing increased signs of failure to thrive.  Patient will likely require hospice consultation in the coming weeks due to progressive changes.      Patient Active Problem List   Diagnosis    Community acquired pneumonia due to Mycoplasma pneumoniae    Claudication of gluteal region ([facility])    Dyslipidemia    Mitral valve insufficiency    Nonrheumatic aortic valve stenosis    Stenosis of carotid artery    Sepsis (HCC)    Dementia (HCC)    Acute alteration in mental status    Bacteremia    Asteatosis cutis    Cholelithiasis with acute cholecystitis without obstruction    Clubbed toes    Diastolic heart failure (HCC)    Edema    Glucose intolerance (impaired glucose tolerance)    Hearing loss    Hyperlipemia    Scoliosis, thoracogenic    Spondylosis of thoracic region without myelopathy or radiculopathy    Vascular insufficiency    Vitamin D deficiency    Pneumonia due to infectious organism    Palliative care encounter    Goals of care, counseling/discussion    Advanced care planning/counseling discussion    Generalized weakness     Past Medical History:   Diagnosis Date    Bilateral lower extremity edema     Carotid stenosis     Dementia (HCC)     Diastolic CHF, acute on chronic (HCC)     mitral stenosis    FH: carotid endarterectomy     Hiatal hernia     Hypoxia     Loss of coordination     Muscle weakness     Pharyngeal dysphagia     Pneumonia      No

## 2024-01-31 PROBLEM — Z51.5 HOSPICE CARE: Status: ACTIVE | Noted: 2023-10-26

## 2024-01-31 LAB
ANION GAP SERPL CALCULATED.3IONS-SCNC: 11 MEQ/L (ref 9–15)
BUN SERPL-MCNC: 42 MG/DL (ref 8–23)
CALCIUM SERPL-MCNC: 7.9 MG/DL (ref 8.5–9.9)
CHLORIDE SERPL-SCNC: 96 MEQ/L (ref 95–107)
CO2 SERPL-SCNC: 32 MEQ/L (ref 20–31)
CREAT SERPL-MCNC: 1.17 MG/DL (ref 0.7–1.2)
ERYTHROCYTE [DISTWIDTH] IN BLOOD BY AUTOMATED COUNT: 14.8 % (ref 11.5–14.5)
GLUCOSE SERPL-MCNC: 126 MG/DL (ref 70–99)
HCT VFR BLD AUTO: 30.2 % (ref 42–52)
HGB BLD-MCNC: 9.4 G/DL (ref 14–18)
MCH RBC QN AUTO: 29.7 PG (ref 27–31.3)
MCHC RBC AUTO-ENTMCNC: 31.1 % (ref 33–37)
MCV RBC AUTO: 95.3 FL (ref 79–92.2)
PLATELET # BLD AUTO: 400 K/UL (ref 130–400)
POTASSIUM SERPL-SCNC: 3.5 MEQ/L (ref 3.4–4.9)
PREALB SERPL-MCNC: 9.6 MG/DL (ref 20–40)
RBC # BLD AUTO: 3.17 M/UL (ref 4.7–6.1)
SODIUM SERPL-SCNC: 139 MEQ/L (ref 135–144)
WBC # BLD AUTO: 11.2 K/UL (ref 4.8–10.8)

## 2024-02-07 LAB
BASOPHILS # BLD: 0 K/UL (ref 0–0.2)
BASOPHILS NFR BLD: 0.2 %
EOSINOPHIL # BLD: 0 K/UL (ref 0–0.7)
EOSINOPHIL NFR BLD: 0.3 %
ERYTHROCYTE [DISTWIDTH] IN BLOOD BY AUTOMATED COUNT: 15.5 % (ref 11.5–14.5)
HCT VFR BLD AUTO: 28.9 % (ref 42–52)
HGB BLD-MCNC: 9.2 G/DL (ref 14–18)
LYMPHOCYTES # BLD: 1.9 K/UL (ref 1–4.8)
LYMPHOCYTES NFR BLD: 13.3 %
MCH RBC QN AUTO: 30.3 PG (ref 27–31.3)
MCHC RBC AUTO-ENTMCNC: 31.8 % (ref 33–37)
MCV RBC AUTO: 95.1 FL (ref 79–92.2)
MONOCYTES # BLD: 0.8 K/UL (ref 0.2–0.8)
MONOCYTES NFR BLD: 5.3 %
NEUTROPHILS # BLD: 11.5 K/UL (ref 1.4–6.5)
NEUTS SEG NFR BLD: 80.4 %
PLATELET # BLD AUTO: 131 K/UL (ref 130–400)
PREALB SERPL-MCNC: 8.4 MG/DL (ref 20–40)
RBC # BLD AUTO: 3.04 M/UL (ref 4.7–6.1)
WBC # BLD AUTO: 14.2 K/UL (ref 4.8–10.8)

## 2024-02-07 NOTE — PROGRESS NOTES
heard.   Pulmonary:      Effort: Pulmonary effort is normal. No respiratory distress.      Breath sounds: Wheezing (chronic, bilateral mid lungs) present. No rales.      Comments: Mild bibasilar crackles, chronic finding  Abdominal:      General: Bowel sounds are normal.      Palpations: Abdomen is soft.   Musculoskeletal:         General: No tenderness, deformity or signs of injury.      Right lower leg: Edema present.      Left lower leg: Edema present.   Skin:     General: Skin is warm and dry.      Coloration: Skin is pale.      Findings: Erythema present.      Comments: Posterior buttocks/coccyx   Neurological:      Mental Status: He is alert. Mental status is at baseline. He is disoriented.      Motor: Weakness present.      Comments: Alert to self   Psychiatric:         Attention and Perception: He is inattentive.         Mood and Affect: Affect is flat.         Speech: Speech is slurred and tangential.         Behavior: Behavior is uncooperative, slowed and withdrawn. Behavior is not agitated.         Thought Content: Thought content does not include suicidal plan.         Cognition and Memory: Memory is impaired. He exhibits impaired recent memory.      Comments: Alert/oriented x1         Assessment:       Diagnosis Orders   1. 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 Hospice care              Plan:      No orders of the defined types were placed in this encounter.    No orders of the defined types were placed in this encounter.      Cont haldol, consider dose increase to 1mg PO bid PRN. Maintain on buspar. F/u as needed for further agitation.     No follow-ups on file.      SANIYA Chavez    Electronically signed by: SANIYA Chavez on 2/7/2024    Please note orders entered on site at facility after discussion with appropriate facility nursing/therapy/ / nutritional staff. Current longstanding medical problems and acute medical issues addressed with staff. Available

## 2024-02-12 NOTE — PROGRESS NOTES
Subjective:      Patient ID: Navarro Rice is a pleasant 89 y.o. male who presents today for:  No chief complaint on file.      [facility]  Patient being followed for new leukocytosis, WBC 11.2.  Patient is on hospice.  Patient showing some ongoing adventitious breath sounds to bilateral mid to lower lungs.  No significant change however from first arrival.  Does have history of PNA versus?  Aspiration PNA.  Intake has been poor.  Per nurse no new dysphagia concerns currently.  No evident change in urinary output, or evident UTI.  Some skin breakdown/macerated skin to posterior buttock/coccyx however no evident skin breakdown with wounds.  Will repeat CBC in a week.  In the meantime we will discuss concerns with family or present today.  Discussed ongoing care parameters, spiritual care, hospice/comfort care with daughter today.  Family understanding of situation with good comprehension of prognosis and care parameters moving forward.      Patient Active Problem List   Diagnosis    Community acquired pneumonia due to Mycoplasma pneumoniae    Claudication of gluteal region (HCC)    Dyslipidemia    Mitral valve insufficiency    Nonrheumatic aortic valve stenosis    Stenosis of carotid artery    Sepsis (HCC)    Dementia (HCC)    Acute alteration in mental status    Bacteremia    Asteatosis cutis    Cholelithiasis with acute cholecystitis without obstruction    Clubbed toes    Diastolic heart failure (HCC)    Edema    Glucose intolerance (impaired glucose tolerance)    Hearing loss    Hyperlipemia    Scoliosis, thoracogenic    Spondylosis of thoracic region without myelopathy or radiculopathy    Vascular insufficiency    Vitamin D deficiency    Pneumonia due to infectious organism    Palliative care encounter    Goals of care, counseling/discussion    Hospice care    Generalized weakness     Past Medical History:   Diagnosis Date    Bilateral lower extremity edema     Carotid stenosis     Dementia